# Patient Record
Sex: FEMALE | Race: BLACK OR AFRICAN AMERICAN | Employment: UNEMPLOYED | URBAN - METROPOLITAN AREA
[De-identification: names, ages, dates, MRNs, and addresses within clinical notes are randomized per-mention and may not be internally consistent; named-entity substitution may affect disease eponyms.]

---

## 2020-06-26 ENCOUNTER — LAB ENCOUNTER (OUTPATIENT)
Dept: LAB | Age: 60
End: 2020-06-26
Attending: FAMILY MEDICINE
Payer: COMMERCIAL

## 2020-06-26 ENCOUNTER — TELEPHONE (OUTPATIENT)
Dept: INTERNAL MEDICINE CLINIC | Facility: CLINIC | Age: 60
End: 2020-06-26

## 2020-06-26 ENCOUNTER — OFFICE VISIT (OUTPATIENT)
Dept: FAMILY MEDICINE CLINIC | Facility: CLINIC | Age: 60
End: 2020-06-26
Payer: COMMERCIAL

## 2020-06-26 VITALS
BODY MASS INDEX: 37.68 KG/M2 | DIASTOLIC BLOOD PRESSURE: 83 MMHG | WEIGHT: 210 LBS | HEIGHT: 62.6 IN | TEMPERATURE: 99 F | SYSTOLIC BLOOD PRESSURE: 125 MMHG | HEART RATE: 74 BPM

## 2020-06-26 DIAGNOSIS — Z00.00 ANNUAL PHYSICAL EXAM: ICD-10-CM

## 2020-06-26 DIAGNOSIS — D57.00 HB-SS DISEASE WITH CRISIS (HCC): Primary | ICD-10-CM

## 2020-06-26 PROCEDURE — 85025 COMPLETE CBC W/AUTO DIFF WBC: CPT

## 2020-06-26 PROCEDURE — 84443 ASSAY THYROID STIM HORMONE: CPT

## 2020-06-26 PROCEDURE — 85007 BL SMEAR W/DIFF WBC COUNT: CPT

## 2020-06-26 PROCEDURE — 80061 LIPID PANEL: CPT

## 2020-06-26 PROCEDURE — 80053 COMPREHEN METABOLIC PANEL: CPT

## 2020-06-26 PROCEDURE — 85027 COMPLETE CBC AUTOMATED: CPT

## 2020-06-26 PROCEDURE — 36415 COLL VENOUS BLD VENIPUNCTURE: CPT

## 2020-06-26 PROCEDURE — 99203 OFFICE O/P NEW LOW 30 MIN: CPT | Performed by: FAMILY MEDICINE

## 2020-06-26 PROCEDURE — 83036 HEMOGLOBIN GLYCOSYLATED A1C: CPT

## 2020-06-26 RX ORDER — FOLIC ACID 1 MG/1
1 TABLET ORAL DAILY
COMMUNITY
Start: 2020-06-05 | End: 2021-06-24

## 2020-06-26 RX ORDER — ALBUTEROL SULFATE 90 UG/1
2 AEROSOL, METERED RESPIRATORY (INHALATION) EVERY 4 HOURS PRN
COMMUNITY
Start: 2020-05-11 | End: 2021-06-24

## 2020-06-26 RX ORDER — LIDOCAINE 50 MG/G
PATCH TOPICAL
COMMUNITY
Start: 2020-04-28 | End: 2020-07-22

## 2020-06-26 RX ORDER — OXYBUTYNIN CHLORIDE 15 MG/1
15 TABLET, EXTENDED RELEASE ORAL DAILY
COMMUNITY
Start: 2020-06-05 | End: 2020-12-10

## 2020-06-26 RX ORDER — WARFARIN SODIUM 5 MG/1
TABLET ORAL
Qty: 90 TABLET | Refills: 0 | Status: SHIPPED | OUTPATIENT
Start: 2020-06-26 | End: 2020-07-21

## 2020-06-26 RX ORDER — WARFARIN SODIUM 5 MG/1
TABLET ORAL
COMMUNITY
Start: 2018-09-19 | End: 2020-06-26

## 2020-06-26 RX ORDER — HYDROCODONE BITARTRATE AND ACETAMINOPHEN 10; 325 MG/1; MG/1
TABLET ORAL
COMMUNITY
Start: 2019-05-16 | End: 2020-06-26

## 2020-06-26 RX ORDER — CYANOCOBALAMIN (VITAMIN B-12) 1000 MCG
1 TABLET, EXTENDED RELEASE ORAL DAILY
COMMUNITY

## 2020-06-26 RX ORDER — FLUTICASONE FUROATE 100 UG/1
1 POWDER RESPIRATORY (INHALATION) DAILY
COMMUNITY
Start: 2020-06-17 | End: 2021-04-06

## 2020-06-26 RX ORDER — FLUTICASONE PROPIONATE 50 MCG
2 SPRAY, SUSPENSION (ML) NASAL DAILY
COMMUNITY
Start: 2020-06-08 | End: 2020-07-07

## 2020-06-26 RX ORDER — AMLODIPINE BESYLATE AND BENAZEPRIL HYDROCHLORIDE 10; 20 MG/1; MG/1
1 CAPSULE ORAL DAILY
COMMUNITY
Start: 2020-05-09 | End: 2021-07-29

## 2020-06-26 RX ORDER — SENNOSIDES 8.6 MG
TABLET ORAL
COMMUNITY
Start: 2017-11-09 | End: 2021-06-24

## 2020-06-26 RX ORDER — ERGOCALCIFEROL (VITAMIN D2) 10 MCG
1000 TABLET ORAL DAILY
COMMUNITY

## 2020-06-26 RX ORDER — HYDROCODONE BITARTRATE AND ACETAMINOPHEN 10; 325 MG/1; MG/1
TABLET ORAL
Qty: 60 TABLET | Refills: 0 | Status: SHIPPED | OUTPATIENT
Start: 2020-06-26 | End: 2020-07-22

## 2020-06-26 RX ORDER — FLUTICASONE PROPIONATE 50 MCG
SPRAY, SUSPENSION (ML) NASAL
COMMUNITY
Start: 2019-04-19 | End: 2021-06-24

## 2020-06-26 NOTE — TELEPHONE ENCOUNTER
Called and spoke with Daphne. She has been on coumadin many years, \"since I was younger, I don't remember the exact year\". Previously managed by [facility] coumadin clinic- recently has switched to [facility], [facility] physicians.   Reports last INR was in M

## 2020-06-26 NOTE — PROGRESS NOTES
HPI:    Patient ID: Lavern Caban is a 62year old female. Patient with history of sickle cell disease,   Asthma, wants to transfer care to us. Has apt with dr Alexandro Velazco soon. Asthma has been controlled. Wants refill on her medications.        Review of TO 1 1/2 TABS DAILY OR AS DIRECTED BY [facility]TICO[facility]ULATION [facility] 90 tablet 0     Allergies:  Opioid Analgesics       SHORTNESS OF BREATH    Comment:heart races with the use of codeine             Itching and tachycardia  Sulfa Antibiotics       SHORTNESS

## 2020-06-29 ENCOUNTER — ANTI-COAG VISIT (OUTPATIENT)
Dept: INTERNAL MEDICINE CLINIC | Facility: CLINIC | Age: 60
End: 2020-06-29
Payer: COMMERCIAL

## 2020-06-29 DIAGNOSIS — Z51.81 ENCOUNTER FOR THERAPEUTIC DRUG MONITORING: ICD-10-CM

## 2020-06-29 DIAGNOSIS — D57.00 HB-SS DISEASE WITH CRISIS (HCC): ICD-10-CM

## 2020-06-29 DIAGNOSIS — Z79.01 LONG TERM (CURRENT) USE OF ANTICOAGULANTS: Primary | ICD-10-CM

## 2020-06-29 PROCEDURE — 85610 PROTHROMBIN TIME: CPT

## 2020-06-29 PROCEDURE — 93793 ANTICOAG MGMT PT WARFARIN: CPT

## 2020-06-29 PROCEDURE — 36416 COLLJ CAPILLARY BLOOD SPEC: CPT

## 2020-07-03 ENCOUNTER — ANTI-COAG VISIT (OUTPATIENT)
Dept: INTERNAL MEDICINE CLINIC | Facility: CLINIC | Age: 60
End: 2020-07-03
Payer: COMMERCIAL

## 2020-07-03 DIAGNOSIS — D57.00 HB-SS DISEASE WITH CRISIS (HCC): ICD-10-CM

## 2020-07-03 DIAGNOSIS — Z51.81 ENCOUNTER FOR THERAPEUTIC DRUG MONITORING: ICD-10-CM

## 2020-07-03 DIAGNOSIS — Z79.01 LONG TERM (CURRENT) USE OF ANTICOAGULANTS: ICD-10-CM

## 2020-07-03 LAB
INR: 3.6 (ref 0.8–1.2)
TEST STRIP EXPIRATION DATE: ABNORMAL DATE

## 2020-07-03 PROCEDURE — 36416 COLLJ CAPILLARY BLOOD SPEC: CPT

## 2020-07-03 PROCEDURE — 93793 ANTICOAG MGMT PT WARFARIN: CPT

## 2020-07-03 PROCEDURE — 85610 PROTHROMBIN TIME: CPT

## 2020-07-07 ENCOUNTER — OFFICE VISIT (OUTPATIENT)
Dept: HEMATOLOGY/ONCOLOGY | Facility: HOSPITAL | Age: 60
End: 2020-07-07
Attending: INTERNAL MEDICINE
Payer: COMMERCIAL

## 2020-07-07 VITALS
DIASTOLIC BLOOD PRESSURE: 64 MMHG | HEART RATE: 72 BPM | SYSTOLIC BLOOD PRESSURE: 126 MMHG | RESPIRATION RATE: 18 BRPM | BODY MASS INDEX: 37.71 KG/M2 | WEIGHT: 210.19 LBS | OXYGEN SATURATION: 94 % | HEIGHT: 62.5 IN | TEMPERATURE: 99 F

## 2020-07-07 DIAGNOSIS — Z80.3 FAMILY HISTORY OF BREAST CANCER IN FIRST DEGREE RELATIVE: ICD-10-CM

## 2020-07-07 DIAGNOSIS — Z80.41 FAMILY HISTORY OF OVARIAN CANCER: ICD-10-CM

## 2020-07-07 DIAGNOSIS — D57.20 SICKLE CELL DISEASE, TYPE SC, WITHOUT CRISIS (HCC): Primary | ICD-10-CM

## 2020-07-07 PROBLEM — D57.00 SICKLE CELL ANEMIA WITH CRISIS (HCC): Status: ACTIVE | Noted: 2020-07-07

## 2020-07-07 PROCEDURE — 99244 OFF/OP CNSLTJ NEW/EST MOD 40: CPT | Performed by: INTERNAL MEDICINE

## 2020-07-07 NOTE — PROGRESS NOTES
FERNANDO Madera is a 62year old female with dx of Sickle cell disease, type sc, without crisis (hcc) here to establish care. Followed with Dr. Daniel Fitzgerald at [facility], then with Dr. Timmy Perales at [facility]. She usually follows every 3 months. She is on p.o. Cardiovascular: Negative for chest pain and leg swelling. Gastrointestinal: Negative for abdominal pain (not curently, only if upset. ), constipation and diarrhea. Genitourinary: Negative for dysuria and frequency.          No changes to urine color   M Allergies:     Opioid Analgesics       SHORTNESS OF BREATH    Comment:heart races with the use of codeine             Itching and tachycardia  Sulfa Antibiotics       SHORTNESS OF BREATH    Comment:Rash and tongue swells, itching  Dust Mite Extract       O Lymphatics: There is no palpable lymphadenopathy throughout in the cervical, supraclavicular, axillary, or inguinal regions.   Psych/Depression: nl        ASSESSMENT/PLAN:   Sickle cell disease, type sc, without crisis (hcc)  (primary encounter diagnosis) No orders of the defined types were placed in this encounter.

## 2020-07-17 ENCOUNTER — ANTI-COAG VISIT (OUTPATIENT)
Dept: INTERNAL MEDICINE CLINIC | Facility: CLINIC | Age: 60
End: 2020-07-17
Payer: COMMERCIAL

## 2020-07-17 DIAGNOSIS — D57.20 SICKLE CELL DISEASE, TYPE SC, WITHOUT CRISIS (HCC): ICD-10-CM

## 2020-07-17 DIAGNOSIS — Z79.01 LONG TERM (CURRENT) USE OF ANTICOAGULANTS: ICD-10-CM

## 2020-07-17 DIAGNOSIS — D57.00 HB-SS DISEASE WITH CRISIS (HCC): ICD-10-CM

## 2020-07-17 DIAGNOSIS — Z51.81 ENCOUNTER FOR THERAPEUTIC DRUG MONITORING: ICD-10-CM

## 2020-07-17 LAB
INR: 3 (ref 0.8–1.2)
TEST STRIP EXPIRATION DATE: ABNORMAL DATE

## 2020-07-17 PROCEDURE — 85610 PROTHROMBIN TIME: CPT

## 2020-07-17 PROCEDURE — 36416 COLLJ CAPILLARY BLOOD SPEC: CPT

## 2020-07-17 PROCEDURE — 93793 ANTICOAG MGMT PT WARFARIN: CPT

## 2020-07-21 RX ORDER — WARFARIN SODIUM 5 MG/1
TABLET ORAL
Qty: 45 TABLET | Refills: 1 | Status: SHIPPED | OUTPATIENT
Start: 2020-07-21 | End: 2020-08-18

## 2020-07-22 RX ORDER — HYDROCODONE BITARTRATE AND ACETAMINOPHEN 10; 325 MG/1; MG/1
TABLET ORAL
Qty: 60 TABLET | Refills: 0 | Status: SHIPPED | OUTPATIENT
Start: 2020-07-22 | End: 2020-08-19

## 2020-07-22 RX ORDER — LIDOCAINE 50 MG/G
PATCH TOPICAL
Qty: 60 PATCH | Refills: 2 | Status: SHIPPED | OUTPATIENT
Start: 2020-07-22 | End: 2021-03-17

## 2020-08-14 ENCOUNTER — ANTI-COAG VISIT (OUTPATIENT)
Dept: INTERNAL MEDICINE CLINIC | Facility: CLINIC | Age: 60
End: 2020-08-14
Payer: COMMERCIAL

## 2020-08-14 DIAGNOSIS — Z51.81 ENCOUNTER FOR THERAPEUTIC DRUG MONITORING: ICD-10-CM

## 2020-08-14 DIAGNOSIS — D57.20 SICKLE CELL DISEASE, TYPE SC, WITHOUT CRISIS (HCC): ICD-10-CM

## 2020-08-14 DIAGNOSIS — Z79.01 LONG TERM (CURRENT) USE OF ANTICOAGULANTS: ICD-10-CM

## 2020-08-14 DIAGNOSIS — D57.00 HB-SS DISEASE WITH CRISIS (HCC): ICD-10-CM

## 2020-08-14 LAB
INR: 2.6 (ref 0.8–1.2)
TEST STRIP EXPIRATION DATE: ABNORMAL DATE

## 2020-08-14 PROCEDURE — 85610 PROTHROMBIN TIME: CPT

## 2020-08-14 PROCEDURE — 36416 COLLJ CAPILLARY BLOOD SPEC: CPT

## 2020-08-14 PROCEDURE — 93793 ANTICOAG MGMT PT WARFARIN: CPT

## 2020-08-18 RX ORDER — WARFARIN SODIUM 5 MG/1
TABLET ORAL
Qty: 45 TABLET | Refills: 1 | Status: SHIPPED | OUTPATIENT
Start: 2020-08-18 | End: 2020-09-09

## 2020-08-19 RX ORDER — HYDROCODONE BITARTRATE AND ACETAMINOPHEN 10; 325 MG/1; MG/1
TABLET ORAL
Qty: 60 TABLET | Refills: 0 | Status: SHIPPED | OUTPATIENT
Start: 2020-08-19 | End: 2020-09-17

## 2020-08-31 ENCOUNTER — OFFICE VISIT (OUTPATIENT)
Dept: HEMATOLOGY/ONCOLOGY | Facility: HOSPITAL | Age: 60
End: 2020-08-31
Attending: INTERNAL MEDICINE
Payer: COMMERCIAL

## 2020-08-31 VITALS
RESPIRATION RATE: 18 BRPM | SYSTOLIC BLOOD PRESSURE: 137 MMHG | OXYGEN SATURATION: 94 % | HEART RATE: 60 BPM | TEMPERATURE: 98 F | DIASTOLIC BLOOD PRESSURE: 66 MMHG

## 2020-08-31 DIAGNOSIS — D57.00 SICKLE CELL ANEMIA WITH CRISIS (HCC): Primary | ICD-10-CM

## 2020-08-31 PROCEDURE — 96360 HYDRATION IV INFUSION INIT: CPT

## 2020-08-31 PROCEDURE — 96361 HYDRATE IV INFUSION ADD-ON: CPT

## 2020-08-31 NOTE — PROGRESS NOTES
Patient arrives for hydration for sickle cell crisis. Patient reports that for the last few days she has noticed an increase her normal sickle cell pain. States she woke up and her left hand was in a locked position and she knew it was time for hydration.

## 2020-09-09 RX ORDER — WARFARIN SODIUM 5 MG/1
TABLET ORAL
Qty: 45 TABLET | Refills: 1 | Status: SHIPPED | OUTPATIENT
Start: 2020-09-09 | End: 2020-10-04

## 2020-09-11 ENCOUNTER — ANTI-COAG VISIT (OUTPATIENT)
Dept: INTERNAL MEDICINE CLINIC | Facility: CLINIC | Age: 60
End: 2020-09-11
Payer: COMMERCIAL

## 2020-09-11 DIAGNOSIS — Z51.81 ENCOUNTER FOR THERAPEUTIC DRUG MONITORING: ICD-10-CM

## 2020-09-11 DIAGNOSIS — D57.00 HB-SS DISEASE WITH CRISIS (HCC): ICD-10-CM

## 2020-09-11 DIAGNOSIS — Z79.01 LONG TERM (CURRENT) USE OF ANTICOAGULANTS: ICD-10-CM

## 2020-09-11 DIAGNOSIS — D57.20 SICKLE CELL DISEASE, TYPE SC, WITHOUT CRISIS (HCC): ICD-10-CM

## 2020-09-11 LAB
INR: 3.2 (ref 0.8–1.2)
TEST STRIP EXPIRATION DATE: ABNORMAL DATE

## 2020-09-11 PROCEDURE — 36416 COLLJ CAPILLARY BLOOD SPEC: CPT

## 2020-09-11 PROCEDURE — 85610 PROTHROMBIN TIME: CPT

## 2020-09-11 PROCEDURE — 93793 ANTICOAG MGMT PT WARFARIN: CPT

## 2020-09-17 RX ORDER — HYDROCODONE BITARTRATE AND ACETAMINOPHEN 10; 325 MG/1; MG/1
TABLET ORAL
Qty: 60 TABLET | Refills: 0 | Status: SHIPPED | OUTPATIENT
Start: 2020-09-17 | End: 2020-10-19

## 2020-09-24 ENCOUNTER — TELEPHONE (OUTPATIENT)
Dept: HEMATOLOGY/ONCOLOGY | Facility: HOSPITAL | Age: 60
End: 2020-09-24

## 2020-09-24 ENCOUNTER — OFFICE VISIT (OUTPATIENT)
Dept: HEMATOLOGY/ONCOLOGY | Facility: HOSPITAL | Age: 60
End: 2020-09-24
Attending: INTERNAL MEDICINE
Payer: COMMERCIAL

## 2020-09-24 VITALS
RESPIRATION RATE: 18 BRPM | SYSTOLIC BLOOD PRESSURE: 117 MMHG | TEMPERATURE: 98 F | OXYGEN SATURATION: 96 % | DIASTOLIC BLOOD PRESSURE: 72 MMHG | HEART RATE: 90 BPM

## 2020-09-24 DIAGNOSIS — D57.00 SICKLE CELL ANEMIA WITH CRISIS (HCC): Primary | ICD-10-CM

## 2020-09-24 PROCEDURE — 96360 HYDRATION IV INFUSION INIT: CPT

## 2020-09-24 PROCEDURE — 96361 HYDRATE IV INFUSION ADD-ON: CPT

## 2020-09-24 NOTE — PROGRESS NOTES
Patient arrives for hydration for sickle cell crisis. Reports increased amount of pain and knew she needed hydration. Reports she thinks its because of the change of weather, states last week was cold and now this week its hot.   States if she gets hydratio

## 2020-09-24 NOTE — TELEPHONE ENCOUNTER
Patient of Dr Luciano Chaudhari - Sickle Cell Disease/Crisis    Patient is asking if she can come in for hydration today. I asked her to please hold. LEVI Ahmadi said yes and will enter the orders. I called Jose Puga and updated her. She said she can come now.  I upda

## 2020-10-04 RX ORDER — WARFARIN SODIUM 5 MG/1
TABLET ORAL
Qty: 45 TABLET | Refills: 1 | Status: SHIPPED | OUTPATIENT
Start: 2020-10-04 | End: 2020-10-30

## 2020-10-09 ENCOUNTER — ANTI-COAG VISIT (OUTPATIENT)
Dept: INTERNAL MEDICINE CLINIC | Facility: CLINIC | Age: 60
End: 2020-10-09
Payer: COMMERCIAL

## 2020-10-09 DIAGNOSIS — D57.20 SICKLE CELL DISEASE, TYPE SC, WITHOUT CRISIS (HCC): ICD-10-CM

## 2020-10-09 DIAGNOSIS — Z51.81 ENCOUNTER FOR THERAPEUTIC DRUG MONITORING: ICD-10-CM

## 2020-10-09 DIAGNOSIS — Z79.01 LONG TERM (CURRENT) USE OF ANTICOAGULANTS: ICD-10-CM

## 2020-10-09 DIAGNOSIS — D57.00 HB-SS DISEASE WITH CRISIS (HCC): ICD-10-CM

## 2020-10-09 PROCEDURE — 85610 PROTHROMBIN TIME: CPT

## 2020-10-09 PROCEDURE — 36416 COLLJ CAPILLARY BLOOD SPEC: CPT

## 2020-10-09 PROCEDURE — 93793 ANTICOAG MGMT PT WARFARIN: CPT

## 2020-10-19 ENCOUNTER — OFFICE VISIT (OUTPATIENT)
Dept: HEMATOLOGY/ONCOLOGY | Facility: HOSPITAL | Age: 60
End: 2020-10-19
Attending: INTERNAL MEDICINE
Payer: COMMERCIAL

## 2020-10-19 ENCOUNTER — TELEPHONE (OUTPATIENT)
Dept: HEMATOLOGY/ONCOLOGY | Facility: HOSPITAL | Age: 60
End: 2020-10-19

## 2020-10-19 VITALS
DIASTOLIC BLOOD PRESSURE: 67 MMHG | OXYGEN SATURATION: 95 % | TEMPERATURE: 98 F | RESPIRATION RATE: 18 BRPM | HEART RATE: 65 BPM | WEIGHT: 216 LBS | BODY MASS INDEX: 39 KG/M2 | SYSTOLIC BLOOD PRESSURE: 124 MMHG

## 2020-10-19 VITALS
RESPIRATION RATE: 18 BRPM | DIASTOLIC BLOOD PRESSURE: 67 MMHG | TEMPERATURE: 98 F | HEIGHT: 62.5 IN | OXYGEN SATURATION: 95 % | WEIGHT: 216 LBS | SYSTOLIC BLOOD PRESSURE: 124 MMHG | BODY MASS INDEX: 38.76 KG/M2 | HEART RATE: 65 BPM

## 2020-10-19 DIAGNOSIS — D57.20 SICKLE CELL DISEASE, TYPE SC, WITHOUT CRISIS (HCC): Primary | ICD-10-CM

## 2020-10-19 PROCEDURE — 99214 OFFICE O/P EST MOD 30 MIN: CPT | Performed by: NURSE PRACTITIONER

## 2020-10-19 RX ORDER — HYDROCODONE BITARTRATE AND ACETAMINOPHEN 10; 325 MG/1; MG/1
TABLET ORAL
Qty: 60 TABLET | Refills: 0 | Status: SHIPPED | OUTPATIENT
Start: 2020-10-19 | End: 2020-11-19

## 2020-10-19 NOTE — TELEPHONE ENCOUNTER
Dr David Eye - Sickle Cell Disease/Crisis    Pain    Pain: (Patient his having pain from the right side of her umbilicus that radiates around to her right lower back. Pain has been occurring for the last few weeks, but has gotten worse.  She is taking Norco 10-32

## 2020-10-19 NOTE — TELEPHONE ENCOUNTER
Daphne called, looking to make an appointment with Dr. Markie Bal this week because she is experiencing the following symptom:    Right side, front and back pain. She states it is a pain that wraps around her waist and is getting progressively worse.     She would

## 2020-10-19 NOTE — PROGRESS NOTES
HPI   Woody Flood is a 62year old female with dx of Sickle cell disease, type sc, without crisis (hcc)  (primary encounter diagnosis) here for follow up. Followed with Dr. Victor M Almendarez at [facility], then with [place]. Sravani [place].   She usually follows every CT abd in May with contrast in June - complex cyst on R. No other findings of significance for abd pain. She has not been on hydroxyurea due to no frequent crisis. Her triggers are weather and states also if she gets upset.      She was following at [facility] • amLODIPine Besy-Benazepril HCl 10-20 MG Oral Cap Take 1 capsule by mouth daily. • Calcium Citrate-Vitamin D 315-250 MG-UNIT Oral Tab Take 1 tablet by mouth daily.      • Vitamin D, Cholecalciferol, (VITAMIN D3) 10 MCG (400 UNIT) Oral Tab Take 1,000 Un • Breast Cancer Maternal Aunt 68   • Other (breast lump removed) Maternal Aunt          PHYSICAL EXAM:    /67 (BP Location: Left arm, Patient Position: Sitting, Cuff Size: large)   Pulse 65   Temp 97.6 °F (36.4 °C) (Temporal)   Resp 18   Ht 1.588 m ( --Encouraged to have mammography yearly  --Recommend that she d/w sister about genetic testing. If her sister has been tested and she has a mutation, advised patient to consider genetic counseling and testing.   This will give her the option of closer surv RDW 16.3 (H) 11.0 - 15.0 %    .0 150.0 - 450.0 10(3)uL    Neutrophil Absolute Prelim 3.81 1.50 - 7.70 x10 (3) uL    Neutrophil Absolute 3.81 1.50 - 7.70 x10(3) uL    Lymphocyte Absolute 2.27 1.00 - 4.00 x10(3) uL    Monocyte Absolute 0.85 0.10 - 1.

## 2020-10-23 ENCOUNTER — TELEPHONE (OUTPATIENT)
Dept: HEMATOLOGY/ONCOLOGY | Facility: HOSPITAL | Age: 60
End: 2020-10-23

## 2020-10-23 ENCOUNTER — HOSPITAL ENCOUNTER (OUTPATIENT)
Dept: CT IMAGING | Facility: HOSPITAL | Age: 60
Discharge: HOME | End: 2020-10-23
Attending: NURSE PRACTITIONER
Payer: COMMERCIAL

## 2020-10-23 DIAGNOSIS — D57.20 SICKLE CELL DISEASE, TYPE SC, WITHOUT CRISIS (HCC): ICD-10-CM

## 2020-10-23 PROCEDURE — 74176 CT ABD & PELVIS W/O CONTRAST: CPT | Performed by: NURSE PRACTITIONER

## 2020-10-23 PROCEDURE — 71260 CT THORAX DX C+: CPT | Performed by: NURSE PRACTITIONER

## 2020-10-23 NOTE — TELEPHONE ENCOUNTER
Jeevan from [address] [place] [address] called looking for clarification on the [atient's CT order for chest w/ contrast and abdomen and pelvis w/o contrast. Please call

## 2020-10-30 RX ORDER — WARFARIN SODIUM 5 MG/1
TABLET ORAL
Qty: 45 TABLET | Refills: 1 | Status: SHIPPED | OUTPATIENT
Start: 2020-10-30 | End: 2020-11-26

## 2020-11-04 ENCOUNTER — TELEPHONE (OUTPATIENT)
Dept: HEMATOLOGY/ONCOLOGY | Facility: HOSPITAL | Age: 60
End: 2020-11-04

## 2020-11-04 NOTE — TELEPHONE ENCOUNTER
Daphne called saying she can't get into her mychart to view her test results, and is asking for a call with her results. I also sent Avalon to the [employer] help line for assistance with her account.

## 2020-11-04 NOTE — TELEPHONE ENCOUNTER
Pt called per Dr. Desire John requestt and notified that CT scan showed no blood clot or pneumonia. Pt verbalizes understanding.

## 2020-11-06 ENCOUNTER — IMMUNIZATION (OUTPATIENT)
Dept: FAMILY MEDICINE CLINIC | Facility: CLINIC | Age: 60
End: 2020-11-06
Payer: COMMERCIAL

## 2020-11-06 ENCOUNTER — ANTI-COAG VISIT (OUTPATIENT)
Dept: INTERNAL MEDICINE CLINIC | Facility: CLINIC | Age: 60
End: 2020-11-06
Payer: COMMERCIAL

## 2020-11-06 DIAGNOSIS — D57.00 HB-SS DISEASE WITH CRISIS (HCC): ICD-10-CM

## 2020-11-06 DIAGNOSIS — D57.20 SICKLE CELL DISEASE, TYPE SC, WITHOUT CRISIS (HCC): ICD-10-CM

## 2020-11-06 DIAGNOSIS — Z23 NEED FOR VACCINATION: ICD-10-CM

## 2020-11-06 DIAGNOSIS — Z51.81 ENCOUNTER FOR THERAPEUTIC DRUG MONITORING: ICD-10-CM

## 2020-11-06 DIAGNOSIS — Z79.01 LONG TERM (CURRENT) USE OF ANTICOAGULANTS: ICD-10-CM

## 2020-11-06 PROCEDURE — 90686 IIV4 VACC NO PRSV 0.5 ML IM: CPT | Performed by: STUDENT IN AN ORGANIZED HEALTH CARE EDUCATION/TRAINING PROGRAM

## 2020-11-06 PROCEDURE — 90471 IMMUNIZATION ADMIN: CPT | Performed by: STUDENT IN AN ORGANIZED HEALTH CARE EDUCATION/TRAINING PROGRAM

## 2020-11-06 PROCEDURE — 85610 PROTHROMBIN TIME: CPT

## 2020-11-06 PROCEDURE — 36416 COLLJ CAPILLARY BLOOD SPEC: CPT

## 2020-11-06 PROCEDURE — 93793 ANTICOAG MGMT PT WARFARIN: CPT

## 2020-11-19 RX ORDER — HYDROCODONE BITARTRATE AND ACETAMINOPHEN 10; 325 MG/1; MG/1
TABLET ORAL
Qty: 60 TABLET | Refills: 0 | Status: SHIPPED | OUTPATIENT
Start: 2020-11-19 | End: 2020-12-22

## 2020-11-26 RX ORDER — WARFARIN SODIUM 5 MG/1
TABLET ORAL
Qty: 45 TABLET | Refills: 1 | Status: SHIPPED | OUTPATIENT
Start: 2020-11-26 | End: 2020-12-22

## 2020-12-04 ENCOUNTER — ANTI-COAG VISIT (OUTPATIENT)
Dept: INTERNAL MEDICINE CLINIC | Facility: CLINIC | Age: 60
End: 2020-12-04
Payer: COMMERCIAL

## 2020-12-04 DIAGNOSIS — D57.00 HB-SS DISEASE WITH CRISIS (HCC): ICD-10-CM

## 2020-12-04 DIAGNOSIS — Z79.01 LONG TERM (CURRENT) USE OF ANTICOAGULANTS: ICD-10-CM

## 2020-12-04 DIAGNOSIS — Z51.81 ENCOUNTER FOR THERAPEUTIC DRUG MONITORING: ICD-10-CM

## 2020-12-04 DIAGNOSIS — D57.20 SICKLE CELL DISEASE, TYPE SC, WITHOUT CRISIS (HCC): ICD-10-CM

## 2020-12-04 PROCEDURE — 36416 COLLJ CAPILLARY BLOOD SPEC: CPT

## 2020-12-04 PROCEDURE — 85610 PROTHROMBIN TIME: CPT

## 2020-12-04 PROCEDURE — 93793 ANTICOAG MGMT PT WARFARIN: CPT

## 2020-12-10 RX ORDER — OXYBUTYNIN CHLORIDE 15 MG/1
TABLET, EXTENDED RELEASE ORAL
Qty: 90 TABLET | Refills: 2 | Status: SHIPPED | OUTPATIENT
Start: 2020-12-10 | End: 2021-06-21

## 2020-12-17 ENCOUNTER — TELEPHONE (OUTPATIENT)
Dept: HEMATOLOGY/ONCOLOGY | Facility: HOSPITAL | Age: 60
End: 2020-12-17

## 2020-12-17 ENCOUNTER — OFFICE VISIT (OUTPATIENT)
Dept: HEMATOLOGY/ONCOLOGY | Facility: HOSPITAL | Age: 60
End: 2020-12-17
Attending: INTERNAL MEDICINE
Payer: COMMERCIAL

## 2020-12-17 VITALS
HEART RATE: 82 BPM | RESPIRATION RATE: 16 BRPM | DIASTOLIC BLOOD PRESSURE: 70 MMHG | SYSTOLIC BLOOD PRESSURE: 118 MMHG | TEMPERATURE: 99 F

## 2020-12-17 DIAGNOSIS — D57.00 SICKLE CELL ANEMIA WITH CRISIS (HCC): Primary | ICD-10-CM

## 2020-12-17 PROCEDURE — 96360 HYDRATION IV INFUSION INIT: CPT

## 2020-12-17 NOTE — PROGRESS NOTES
Patient is here for hydration, has history of Sickle cell Anemia. States this morning her Right shoulder and Right hip was popping, when she has those symptoms she needs hydration. IV placed in Right ac/  1L 0.9NS started to infusion over 2 hours.   Aaron Enciso

## 2020-12-17 NOTE — TELEPHONE ENCOUNTER
Dr Bharat Duran patient - Sickle Cell patient called asking for hydration. I spoke with Kecia Gonzalez and she said Daphne can come at 3:15 pm today. Daphne accepted the appointment.

## 2020-12-22 RX ORDER — HYDROCODONE BITARTRATE AND ACETAMINOPHEN 10; 325 MG/1; MG/1
TABLET ORAL
Qty: 60 TABLET | Refills: 0 | Status: SHIPPED | OUTPATIENT
Start: 2020-12-22 | End: 2021-01-07

## 2020-12-22 RX ORDER — WARFARIN SODIUM 5 MG/1
TABLET ORAL
Qty: 45 TABLET | Refills: 1 | Status: SHIPPED | OUTPATIENT
Start: 2020-12-22 | End: 2021-01-18

## 2020-12-30 ENCOUNTER — ANTI-COAG VISIT (OUTPATIENT)
Dept: INTERNAL MEDICINE CLINIC | Facility: CLINIC | Age: 60
End: 2020-12-30
Payer: COMMERCIAL

## 2020-12-30 DIAGNOSIS — Z51.81 ENCOUNTER FOR THERAPEUTIC DRUG MONITORING: ICD-10-CM

## 2020-12-30 DIAGNOSIS — Z79.01 LONG TERM (CURRENT) USE OF ANTICOAGULANTS: ICD-10-CM

## 2020-12-30 DIAGNOSIS — D57.00 HB-SS DISEASE WITH CRISIS (HCC): ICD-10-CM

## 2020-12-30 DIAGNOSIS — D57.20 SICKLE CELL DISEASE, TYPE SC, WITHOUT CRISIS (HCC): ICD-10-CM

## 2020-12-30 PROCEDURE — 85610 PROTHROMBIN TIME: CPT

## 2020-12-30 PROCEDURE — 36416 COLLJ CAPILLARY BLOOD SPEC: CPT

## 2020-12-30 PROCEDURE — 93793 ANTICOAG MGMT PT WARFARIN: CPT

## 2021-01-07 ENCOUNTER — NURSE ONLY (OUTPATIENT)
Dept: HEMATOLOGY/ONCOLOGY | Facility: HOSPITAL | Age: 61
End: 2021-01-07
Attending: INTERNAL MEDICINE
Payer: COMMERCIAL

## 2021-01-07 VITALS
DIASTOLIC BLOOD PRESSURE: 73 MMHG | RESPIRATION RATE: 16 BRPM | WEIGHT: 214 LBS | OXYGEN SATURATION: 95 % | HEART RATE: 75 BPM | SYSTOLIC BLOOD PRESSURE: 123 MMHG | TEMPERATURE: 98 F | BODY MASS INDEX: 38.4 KG/M2 | HEIGHT: 62.5 IN

## 2021-01-07 DIAGNOSIS — D57.20 SICKLE CELL DISEASE, TYPE SC, WITHOUT CRISIS (HCC): ICD-10-CM

## 2021-01-07 DIAGNOSIS — D57.20 SICKLE CELL DISEASE, TYPE SC, WITHOUT CRISIS (HCC): Primary | ICD-10-CM

## 2021-01-07 PROBLEM — Z51.81 ENCOUNTER FOR MEDICATION MONITORING: Status: ACTIVE | Noted: 2021-01-07

## 2021-01-07 PROCEDURE — 99213 OFFICE O/P EST LOW 20 MIN: CPT | Performed by: INTERNAL MEDICINE

## 2021-01-07 PROCEDURE — 36415 COLL VENOUS BLD VENIPUNCTURE: CPT

## 2021-01-07 RX ORDER — HYDROCODONE BITARTRATE AND ACETAMINOPHEN 10; 325 MG/1; MG/1
TABLET ORAL
Qty: 60 TABLET | Refills: 0 | Status: SHIPPED | OUTPATIENT
Start: 2021-01-07 | End: 2021-02-18

## 2021-01-07 NOTE — PROGRESS NOTES
FERNANDO Amador is a 62year old female with dx of Sickle cell disease, type sc, without crisis (hcc)  (primary encounter diagnosis) here to establish care. Followed with Dr. Governor May at [facility], then with Dr. Kianna Saha at [facility].   She usually follows e HYDROcodone-acetaminophen  MG Oral Tab 1/2 to one tablet every four hours as needed for pain 60 tablet 0   • OXYBUTYNIN CHLORIDE ER 15 MG Oral Tablet 24 Hr TAKE 1 TABLET BY MOUTH EVERY DAY 90 tablet 2   • lidocaine 5 % External Patch PLACE 2 PATCHES use: Never      Family History   Problem Relation Age of Onset   • Breast Cancer Mother 64   • Heart Disease Father    • Dementia Father    • Sickle Cell Sister         SC   • Heart Attack Brother    • Dementia Maternal Grandmother    • Stroke Maternal Gra

## 2021-01-15 ENCOUNTER — HOSPITAL ENCOUNTER (OUTPATIENT)
Dept: MAMMOGRAPHY | Facility: HOSPITAL | Age: 61
Discharge: HOME | End: 2021-01-15
Attending: FAMILY MEDICINE
Payer: COMMERCIAL

## 2021-01-15 DIAGNOSIS — Z00.00 ANNUAL PHYSICAL EXAM: ICD-10-CM

## 2021-01-15 PROCEDURE — 77063 BREAST TOMOSYNTHESIS BI: CPT | Performed by: FAMILY MEDICINE

## 2021-01-15 PROCEDURE — 77067 SCR MAMMO BI INCL CAD: CPT | Performed by: FAMILY MEDICINE

## 2021-01-18 RX ORDER — WARFARIN SODIUM 5 MG/1
TABLET ORAL
Qty: 45 TABLET | Refills: 1 | Status: SHIPPED | OUTPATIENT
Start: 2021-01-18 | End: 2021-02-09

## 2021-01-27 ENCOUNTER — ANTI-COAG VISIT (OUTPATIENT)
Dept: INTERNAL MEDICINE CLINIC | Facility: CLINIC | Age: 61
End: 2021-01-27
Payer: COMMERCIAL

## 2021-01-27 DIAGNOSIS — Z51.81 ENCOUNTER FOR THERAPEUTIC DRUG MONITORING: ICD-10-CM

## 2021-01-27 DIAGNOSIS — D57.20 SICKLE CELL DISEASE, TYPE SC, WITHOUT CRISIS (HCC): ICD-10-CM

## 2021-01-27 DIAGNOSIS — Z79.01 LONG TERM (CURRENT) USE OF ANTICOAGULANTS: ICD-10-CM

## 2021-01-27 DIAGNOSIS — D57.00 HB-SS DISEASE WITH CRISIS (HCC): ICD-10-CM

## 2021-01-27 LAB
INR: 2.6 (ref 0.8–1.2)
TEST STRIP EXPIRATION DATE: ABNORMAL DATE

## 2021-01-27 PROCEDURE — 93793 ANTICOAG MGMT PT WARFARIN: CPT

## 2021-01-27 PROCEDURE — 36416 COLLJ CAPILLARY BLOOD SPEC: CPT

## 2021-01-27 PROCEDURE — 85610 PROTHROMBIN TIME: CPT

## 2021-02-02 ENCOUNTER — HOSPITAL ENCOUNTER (EMERGENCY)
Facility: HOSPITAL | Age: 61
Discharge: HOME | End: 2021-02-02
Attending: EMERGENCY MEDICINE
Payer: COMMERCIAL

## 2021-02-02 ENCOUNTER — NURSE TRIAGE (OUTPATIENT)
Dept: FAMILY MEDICINE CLINIC | Facility: CLINIC | Age: 61
End: 2021-02-02

## 2021-02-02 VITALS
TEMPERATURE: 98 F | DIASTOLIC BLOOD PRESSURE: 77 MMHG | OXYGEN SATURATION: 98 % | WEIGHT: 214 LBS | RESPIRATION RATE: 18 BRPM | SYSTOLIC BLOOD PRESSURE: 125 MMHG | BODY MASS INDEX: 39 KG/M2 | HEART RATE: 79 BPM

## 2021-02-02 DIAGNOSIS — M79.3 PANNICULITIS, NODULAR NONSUPPURATIVE: Primary | ICD-10-CM

## 2021-02-02 LAB
BILIRUB UR QL: NEGATIVE
CLARITY UR: CLEAR
COLOR UR: YELLOW
GLUCOSE UR-MCNC: NEGATIVE MG/DL
HGB UR QL STRIP.AUTO: NEGATIVE
KETONES UR-MCNC: NEGATIVE MG/DL
NITRITE UR QL STRIP.AUTO: NEGATIVE
PH UR: 7 [PH] (ref 5–8)
PROT UR-MCNC: NEGATIVE MG/DL
RBC #/AREA URNS AUTO: 1 /HPF
SP GR UR STRIP: 1.01 (ref 1–1.03)
UROBILINOGEN UR STRIP-ACNC: <2
WBC #/AREA URNS AUTO: 14 /HPF

## 2021-02-02 PROCEDURE — 81001 URINALYSIS AUTO W/SCOPE: CPT | Performed by: EMERGENCY MEDICINE

## 2021-02-02 PROCEDURE — 99283 EMERGENCY DEPT VISIT LOW MDM: CPT

## 2021-02-02 PROCEDURE — 87086 URINE CULTURE/COLONY COUNT: CPT | Performed by: EMERGENCY MEDICINE

## 2021-02-02 RX ORDER — KETOROLAC TROMETHAMINE 10 MG/1
10 TABLET, FILM COATED ORAL EVERY 8 HOURS PRN
Qty: 15 TABLET | Refills: 0 | Status: SHIPPED | OUTPATIENT
Start: 2021-02-02 | End: 2021-02-09

## 2021-02-02 NOTE — TELEPHONE ENCOUNTER
Spoke with patient,advised ED per Dr Aliya Monte recommendation and agrees, states will be going to ER [facility], instructed to wear mask. Appointment cancelled for today.

## 2021-02-02 NOTE — TELEPHONE ENCOUNTER
Please reply to pool: EM RN TRIAGE  Action Requested: Summary for Provider     []  Critical Lab, Recommendations Needed  [] Need Additional Advice  []   FYI    []   Need Orders  [] Need Medications Sent to Pharmacy  []  Other     SUMMARY: Wants to be s

## 2021-02-02 NOTE — ED PROVIDER NOTES
Patient Seen in: [facility] AND [facility] Emergency Department    History   Patient presents with:  Flank Pain    Stated Complaint: flank pain on both sides    HPI    Patient complains of pain in sides bilateral.  complains of small subcutaneous nodules in skin Oral Tab,  Take 1 mg by mouth daily. Ipratropium-Albuterol  MCG/ACT Inhalation Aero Soln,  Inhale 1 puff into the lungs.    Senna 8.6 MG Oral Tab,  One to two tablets at bedtime       Family History   Problem Relation Age of Onset   • Breast Cancer cooperative,    Differential includes:panniculitis nodular vs. Lipoma no evidence of acute infection    ED Course     Labs Reviewed   URINALYSIS WITH CULTURE REFLEX       MDM       Cardiac Monitor: Pulse Readings from Last 1 Encounters:  02/02/21 : 79  , ,

## 2021-02-03 NOTE — TELEPHONE ENCOUNTER
Per chart notes pt was treated/released from [address] [place] [address] yesterday.      Clinical Impression:  Panniculitis, nodular nonsuppurative  (primary encounter diagnosis)     Disposition:  Discharge  2/2/2021  3:27 pm     Follow-up:  MD Kayla Ricardo

## 2021-02-09 RX ORDER — WARFARIN SODIUM 5 MG/1
TABLET ORAL
Qty: 45 TABLET | Refills: 1 | Status: SHIPPED | OUTPATIENT
Start: 2021-02-09 | End: 2021-03-04

## 2021-02-18 RX ORDER — HYDROCODONE BITARTRATE AND ACETAMINOPHEN 10; 325 MG/1; MG/1
TABLET ORAL
Qty: 60 TABLET | Refills: 0 | Status: SHIPPED | OUTPATIENT
Start: 2021-02-18 | End: 2021-03-17

## 2021-02-25 ENCOUNTER — ANTI-COAG VISIT (OUTPATIENT)
Dept: INTERNAL MEDICINE CLINIC | Facility: CLINIC | Age: 61
End: 2021-02-25
Payer: COMMERCIAL

## 2021-02-25 DIAGNOSIS — Z51.81 ENCOUNTER FOR THERAPEUTIC DRUG MONITORING: ICD-10-CM

## 2021-02-25 DIAGNOSIS — D57.20 SICKLE CELL DISEASE, TYPE SC, WITHOUT CRISIS (HCC): ICD-10-CM

## 2021-02-25 DIAGNOSIS — D57.00 HB-SS DISEASE WITH CRISIS (HCC): ICD-10-CM

## 2021-02-25 DIAGNOSIS — Z79.01 LONG TERM (CURRENT) USE OF ANTICOAGULANTS: ICD-10-CM

## 2021-02-25 LAB — INR: 1.9 (ref 0.8–1.2)

## 2021-02-25 PROCEDURE — 85610 PROTHROMBIN TIME: CPT

## 2021-02-25 PROCEDURE — 93793 ANTICOAG MGMT PT WARFARIN: CPT

## 2021-02-25 PROCEDURE — 36416 COLLJ CAPILLARY BLOOD SPEC: CPT

## 2021-03-04 RX ORDER — WARFARIN SODIUM 5 MG/1
TABLET ORAL
Qty: 45 TABLET | Refills: 1 | Status: SHIPPED | OUTPATIENT
Start: 2021-03-04 | End: 2021-03-27

## 2021-03-11 ENCOUNTER — TELEPHONE (OUTPATIENT)
Dept: FAMILY MEDICINE CLINIC | Facility: CLINIC | Age: 61
End: 2021-03-11

## 2021-03-11 RX ORDER — OXYBUTYNIN CHLORIDE 15 MG/1
15 TABLET, EXTENDED RELEASE ORAL DAILY
Qty: 90 TABLET | Refills: 1 | Status: CANCELLED | OUTPATIENT
Start: 2021-03-11

## 2021-03-11 RX ORDER — FLUTICASONE FUROATE 100 UG/1
1 POWDER RESPIRATORY (INHALATION) DAILY
Qty: 3 EACH | Refills: 1 | Status: CANCELLED | OUTPATIENT
Start: 2021-03-11

## 2021-03-11 NOTE — TELEPHONE ENCOUNTER
[facility], [phone] would like a list of all medications approved by Dr. Taylor Sanford. Patient states that her old medications are still under her previous PCP, Dr. Valentine Sanchez, which she does not see any longer.

## 2021-03-11 NOTE — TELEPHONE ENCOUNTER
Spoke with Lucian Ledbetter from [facility],   verified  She stated pt requested a ref for Arnuity inhaler and oxybutynin. Pharm updated. pls advise, thanks in advance.          Requested Prescriptions     Pending Prescriptions Disp Refills   • Oxybutynin Chlo

## 2021-03-17 RX ORDER — HYDROCODONE BITARTRATE AND ACETAMINOPHEN 10; 325 MG/1; MG/1
TABLET ORAL
Qty: 60 TABLET | Refills: 0 | Status: SHIPPED | OUTPATIENT
Start: 2021-03-17 | End: 2021-04-16

## 2021-03-17 RX ORDER — LIDOCAINE 50 MG/G
PATCH TOPICAL
Qty: 60 PATCH | Refills: 2 | Status: SHIPPED | OUTPATIENT
Start: 2021-03-17 | End: 2021-09-10

## 2021-03-23 RX ORDER — OXYBUTYNIN CHLORIDE 15 MG/1
15 TABLET, EXTENDED RELEASE ORAL DAILY
Qty: 90 TABLET | Refills: 1 | Status: SHIPPED | OUTPATIENT
Start: 2021-03-23 | End: 2021-04-06

## 2021-03-23 RX ORDER — FLUTICASONE FUROATE 100 UG/1
1 POWDER RESPIRATORY (INHALATION) DAILY
Qty: 90 EACH | Refills: 0 | Status: SHIPPED | OUTPATIENT
Start: 2021-03-23 | End: 2021-04-06

## 2021-03-25 ENCOUNTER — TELEPHONE (OUTPATIENT)
Dept: HEMATOLOGY/ONCOLOGY | Facility: HOSPITAL | Age: 61
End: 2021-03-25

## 2021-03-25 NOTE — TELEPHONE ENCOUNTER
Patient called and asked if she could get an IV infusion tomorrow. Please prepare order. Thank you.  Korin

## 2021-03-26 ENCOUNTER — OFFICE VISIT (OUTPATIENT)
Dept: HEMATOLOGY/ONCOLOGY | Facility: HOSPITAL | Age: 61
End: 2021-03-26
Attending: INTERNAL MEDICINE
Payer: COMMERCIAL

## 2021-03-26 VITALS
DIASTOLIC BLOOD PRESSURE: 62 MMHG | OXYGEN SATURATION: 94 % | HEART RATE: 71 BPM | TEMPERATURE: 98 F | RESPIRATION RATE: 16 BRPM | SYSTOLIC BLOOD PRESSURE: 115 MMHG

## 2021-03-26 DIAGNOSIS — D57.00 SICKLE CELL ANEMIA WITH CRISIS (HCC): Primary | ICD-10-CM

## 2021-03-26 PROCEDURE — 96361 HYDRATE IV INFUSION ADD-ON: CPT

## 2021-03-26 PROCEDURE — 96360 HYDRATION IV INFUSION INIT: CPT

## 2021-03-26 NOTE — PROGRESS NOTES
Patient is here for hydration, has history of Sickle cell Anemia. States this morning her Right shoulder and Right hip was popping, when she has those symptoms she needs hydration. IV placed in Right ac/  1L 0.9NS started to infusion over 2 hours.   Nicho Andrade

## 2021-03-27 RX ORDER — WARFARIN SODIUM 5 MG/1
TABLET ORAL
Qty: 45 TABLET | Refills: 1 | Status: SHIPPED | OUTPATIENT
Start: 2021-03-27 | End: 2021-04-21

## 2021-04-06 ENCOUNTER — LAB ENCOUNTER (OUTPATIENT)
Dept: LAB | Age: 61
End: 2021-04-06
Attending: FAMILY MEDICINE
Payer: COMMERCIAL

## 2021-04-06 ENCOUNTER — OFFICE VISIT (OUTPATIENT)
Dept: FAMILY MEDICINE CLINIC | Facility: CLINIC | Age: 61
End: 2021-04-06
Payer: COMMERCIAL

## 2021-04-06 ENCOUNTER — TELEPHONE (OUTPATIENT)
Dept: FAMILY MEDICINE CLINIC | Facility: CLINIC | Age: 61
End: 2021-04-06

## 2021-04-06 VITALS
SYSTOLIC BLOOD PRESSURE: 130 MMHG | HEART RATE: 106 BPM | BODY MASS INDEX: 38.57 KG/M2 | WEIGHT: 215 LBS | HEIGHT: 62.5 IN | DIASTOLIC BLOOD PRESSURE: 80 MMHG

## 2021-04-06 DIAGNOSIS — R07.9 CHEST PAIN, UNSPECIFIED TYPE: ICD-10-CM

## 2021-04-06 DIAGNOSIS — R07.9 CHEST PAIN, UNSPECIFIED TYPE: Primary | ICD-10-CM

## 2021-04-06 PROCEDURE — 3079F DIAST BP 80-89 MM HG: CPT | Performed by: FAMILY MEDICINE

## 2021-04-06 PROCEDURE — 3075F SYST BP GE 130 - 139MM HG: CPT | Performed by: FAMILY MEDICINE

## 2021-04-06 PROCEDURE — 93005 ELECTROCARDIOGRAM TRACING: CPT

## 2021-04-06 PROCEDURE — 93010 ELECTROCARDIOGRAM REPORT: CPT | Performed by: FAMILY MEDICINE

## 2021-04-06 PROCEDURE — 99214 OFFICE O/P EST MOD 30 MIN: CPT | Performed by: FAMILY MEDICINE

## 2021-04-06 PROCEDURE — 3008F BODY MASS INDEX DOCD: CPT | Performed by: FAMILY MEDICINE

## 2021-04-06 NOTE — PROGRESS NOTES
HPI/Subjective:   Patient ID: Raquel Cannon is a 62year old female. Patient f/u er. Denies any abdominal pain any more . Was dg with paniculitis. But the pain is now gone completely. Also for f/u asthma.  Doing well with albuterol takes it no more then taking: Reported on 4/6/2021)       Allergies:  Codeine                 PALPITATIONS  Morphine                PALPITATIONS  Opioid Analgesics       SHORTNESS OF BREATH    Comment:heart races with the use of codeine             Itching and tachycardia  Sulf

## 2021-04-07 ENCOUNTER — ANTI-COAG VISIT (OUTPATIENT)
Dept: INTERNAL MEDICINE CLINIC | Facility: CLINIC | Age: 61
End: 2021-04-07
Payer: COMMERCIAL

## 2021-04-07 DIAGNOSIS — Z79.01 LONG TERM (CURRENT) USE OF ANTICOAGULANTS: ICD-10-CM

## 2021-04-07 DIAGNOSIS — D57.00 HB-SS DISEASE WITH CRISIS (HCC): ICD-10-CM

## 2021-04-07 DIAGNOSIS — D57.20 SICKLE CELL DISEASE, TYPE SC, WITHOUT CRISIS (HCC): ICD-10-CM

## 2021-04-07 DIAGNOSIS — Z51.81 ENCOUNTER FOR THERAPEUTIC DRUG MONITORING: ICD-10-CM

## 2021-04-07 PROCEDURE — 93793 ANTICOAG MGMT PT WARFARIN: CPT

## 2021-04-07 PROCEDURE — 36416 COLLJ CAPILLARY BLOOD SPEC: CPT

## 2021-04-07 PROCEDURE — 85610 PROTHROMBIN TIME: CPT

## 2021-04-13 NOTE — TELEPHONE ENCOUNTER
Dr. Rajni Begum,     Pt spouse is requesting intermittent FMLA to care for pt due to her sickle cell disease flare ups: 1-2 flare ups per month, 1-3 appts per month for the duration of 6 months. Do you support?     Thank you,   Michelle Myers

## 2021-04-14 NOTE — TELEPHONE ENCOUNTER
Dr. Kiel Diaz     Please sign off on form if you agree:Spouse intermittent FMLA  1-2 flare ups per month, 1-3 appts per month for the duration of 6 months due to pt's sickle cell disease.    -Highlight the patient and hit \"Chart\" button.   -In Chart Review,

## 2021-04-16 RX ORDER — HYDROCODONE BITARTRATE AND ACETAMINOPHEN 10; 325 MG/1; MG/1
TABLET ORAL
Qty: 60 TABLET | Refills: 0 | Status: SHIPPED | OUTPATIENT
Start: 2021-04-16 | End: 2021-05-18

## 2021-04-18 ENCOUNTER — LAB ENCOUNTER (OUTPATIENT)
Dept: LAB | Facility: HOSPITAL | Age: 61
End: 2021-04-18
Attending: FAMILY MEDICINE
Payer: COMMERCIAL

## 2021-04-18 DIAGNOSIS — R07.9 CHEST PAIN, UNSPECIFIED TYPE: ICD-10-CM

## 2021-04-21 ENCOUNTER — HOSPITAL ENCOUNTER (OUTPATIENT)
Dept: CV DIAGNOSTICS | Facility: HOSPITAL | Age: 61
Discharge: HOME | End: 2021-04-21
Attending: FAMILY MEDICINE
Payer: COMMERCIAL

## 2021-04-21 DIAGNOSIS — R07.9 CHEST PAIN, UNSPECIFIED TYPE: ICD-10-CM

## 2021-04-21 PROCEDURE — 93350 STRESS TTE ONLY: CPT | Performed by: FAMILY MEDICINE

## 2021-04-21 PROCEDURE — 93017 CV STRESS TEST TRACING ONLY: CPT | Performed by: FAMILY MEDICINE

## 2021-04-21 PROCEDURE — 93018 CV STRESS TEST I&R ONLY: CPT | Performed by: FAMILY MEDICINE

## 2021-04-21 PROCEDURE — 93016 CV STRESS TEST SUPVJ ONLY: CPT | Performed by: FAMILY MEDICINE

## 2021-04-21 RX ORDER — WARFARIN SODIUM 5 MG/1
TABLET ORAL
Qty: 45 TABLET | Refills: 1 | Status: SHIPPED | OUTPATIENT
Start: 2021-04-21 | End: 2021-05-13

## 2021-04-21 NOTE — TELEPHONE ENCOUNTER
Spouse FMLA completed, hand signed Dr Frandy De La Rosa. Forms faxed to [employer] [phone].  Pt is aware

## 2021-05-13 RX ORDER — WARFARIN SODIUM 5 MG/1
TABLET ORAL
Qty: 45 TABLET | Refills: 1 | Status: SHIPPED | OUTPATIENT
Start: 2021-05-13 | End: 2021-06-09

## 2021-05-18 ENCOUNTER — TELEPHONE (OUTPATIENT)
Dept: HEMATOLOGY/ONCOLOGY | Facility: HOSPITAL | Age: 61
End: 2021-05-18

## 2021-05-18 ENCOUNTER — OFFICE VISIT (OUTPATIENT)
Dept: HEMATOLOGY/ONCOLOGY | Facility: HOSPITAL | Age: 61
End: 2021-05-18
Attending: INTERNAL MEDICINE
Payer: COMMERCIAL

## 2021-05-18 VITALS
TEMPERATURE: 99 F | RESPIRATION RATE: 16 BRPM | SYSTOLIC BLOOD PRESSURE: 138 MMHG | OXYGEN SATURATION: 93 % | HEART RATE: 101 BPM | DIASTOLIC BLOOD PRESSURE: 76 MMHG

## 2021-05-18 DIAGNOSIS — D57.00 SICKLE CELL ANEMIA WITH CRISIS (HCC): Primary | ICD-10-CM

## 2021-05-18 PROCEDURE — 96361 HYDRATE IV INFUSION ADD-ON: CPT

## 2021-05-18 PROCEDURE — 96360 HYDRATION IV INFUSION INIT: CPT

## 2021-05-18 RX ORDER — HYDROCODONE BITARTRATE AND ACETAMINOPHEN 10; 325 MG/1; MG/1
TABLET ORAL
Qty: 60 TABLET | Refills: 0 | Status: SHIPPED | OUTPATIENT
Start: 2021-05-18 | End: 2021-06-17

## 2021-05-18 NOTE — TELEPHONE ENCOUNTER
Called Daphne, she is having pain in left leg to hip, requesting hydration for Sickle cell. Apt in infusion at 130 pm per Elvira. Daphne verbalizes understanding. Denies fevers.

## 2021-05-18 NOTE — PROGRESS NOTES
Pt here for hydration; h/o sickle cell anemia with crisis. Reports left hip/leg pain today and denies any other issues or concerns. Norco refill was approved by [employer] this afternoon and pt made aware.      Pt tolerated hydration well without incident or compl

## 2021-05-19 ENCOUNTER — ANTI-COAG VISIT (OUTPATIENT)
Dept: INTERNAL MEDICINE CLINIC | Facility: CLINIC | Age: 61
End: 2021-05-19
Payer: COMMERCIAL

## 2021-05-19 DIAGNOSIS — D57.00 HB-SS DISEASE WITH CRISIS (HCC): ICD-10-CM

## 2021-05-19 DIAGNOSIS — Z51.81 ENCOUNTER FOR THERAPEUTIC DRUG MONITORING: ICD-10-CM

## 2021-05-19 DIAGNOSIS — Z79.01 LONG TERM (CURRENT) USE OF ANTICOAGULANTS: ICD-10-CM

## 2021-05-19 DIAGNOSIS — D57.20 SICKLE CELL DISEASE, TYPE SC, WITHOUT CRISIS (HCC): ICD-10-CM

## 2021-05-19 PROCEDURE — 85610 PROTHROMBIN TIME: CPT

## 2021-05-19 PROCEDURE — 93793 ANTICOAG MGMT PT WARFARIN: CPT

## 2021-05-19 PROCEDURE — 36416 COLLJ CAPILLARY BLOOD SPEC: CPT

## 2021-05-27 ENCOUNTER — TELEPHONE (OUTPATIENT)
Dept: FAMILY MEDICINE CLINIC | Facility: CLINIC | Age: 61
End: 2021-05-27

## 2021-05-27 NOTE — TELEPHONE ENCOUNTER
[place] paperwork was completed and sent back to the patient, but the employer said there isn't enough information on the forms and denied the claim. Please advise patient how to proceed.   Thank you

## 2021-06-04 NOTE — TELEPHONE ENCOUNTER
Called to pt in regards to spouse FMLA being denied - pt did not have exact reason as to why spouse employer denied form, only that she was told that it was \"incomplete\". Reviewing form and every section was completed.  Pt will have spouse call Forms Dept with possible more information

## 2021-06-09 RX ORDER — WARFARIN SODIUM 5 MG/1
TABLET ORAL
Qty: 45 TABLET | Refills: 1 | Status: SHIPPED | OUTPATIENT
Start: 2021-06-09 | End: 2021-07-07

## 2021-06-16 ENCOUNTER — ANTI-COAG VISIT (OUTPATIENT)
Dept: INTERNAL MEDICINE CLINIC | Facility: CLINIC | Age: 61
End: 2021-06-16
Payer: COMMERCIAL

## 2021-06-16 ENCOUNTER — TELEPHONE (OUTPATIENT)
Dept: INTERNAL MEDICINE CLINIC | Facility: CLINIC | Age: 61
End: 2021-06-16

## 2021-06-16 DIAGNOSIS — D57.00 HB-SS DISEASE WITH CRISIS (HCC): ICD-10-CM

## 2021-06-16 DIAGNOSIS — Z79.01 LONG TERM (CURRENT) USE OF ANTICOAGULANTS: ICD-10-CM

## 2021-06-16 DIAGNOSIS — D57.20 SICKLE CELL DISEASE, TYPE SC, WITHOUT CRISIS (HCC): Primary | ICD-10-CM

## 2021-06-16 DIAGNOSIS — D57.20 SICKLE CELL DISEASE, TYPE SC, WITHOUT CRISIS (HCC): ICD-10-CM

## 2021-06-16 DIAGNOSIS — Z51.81 ENCOUNTER FOR THERAPEUTIC DRUG MONITORING: ICD-10-CM

## 2021-06-16 PROCEDURE — 93793 ANTICOAG MGMT PT WARFARIN: CPT

## 2021-06-16 PROCEDURE — 85610 PROTHROMBIN TIME: CPT

## 2021-06-17 ENCOUNTER — TELEPHONE (OUTPATIENT)
Dept: HEMATOLOGY/ONCOLOGY | Facility: HOSPITAL | Age: 61
End: 2021-06-17

## 2021-06-17 ENCOUNTER — OFFICE VISIT (OUTPATIENT)
Dept: HEMATOLOGY/ONCOLOGY | Facility: HOSPITAL | Age: 61
End: 2021-06-17
Attending: INTERNAL MEDICINE
Payer: COMMERCIAL

## 2021-06-17 VITALS
RESPIRATION RATE: 16 BRPM | DIASTOLIC BLOOD PRESSURE: 60 MMHG | SYSTOLIC BLOOD PRESSURE: 130 MMHG | HEART RATE: 67 BPM | TEMPERATURE: 98 F | OXYGEN SATURATION: 94 %

## 2021-06-17 VITALS
RESPIRATION RATE: 16 BRPM | HEART RATE: 67 BPM | DIASTOLIC BLOOD PRESSURE: 60 MMHG | TEMPERATURE: 98 F | SYSTOLIC BLOOD PRESSURE: 130 MMHG | OXYGEN SATURATION: 94 %

## 2021-06-17 DIAGNOSIS — D57.00 HB-SS DISEASE WITH CRISIS (HCC): Primary | ICD-10-CM

## 2021-06-17 DIAGNOSIS — D57.00 SICKLE CELL ANEMIA WITH CRISIS (HCC): Primary | ICD-10-CM

## 2021-06-17 PROCEDURE — 85025 COMPLETE CBC W/AUTO DIFF WBC: CPT

## 2021-06-17 PROCEDURE — 99213 OFFICE O/P EST LOW 20 MIN: CPT | Performed by: NURSE PRACTITIONER

## 2021-06-17 PROCEDURE — 96361 HYDRATE IV INFUSION ADD-ON: CPT

## 2021-06-17 PROCEDURE — 85007 BL SMEAR W/DIFF WBC COUNT: CPT

## 2021-06-17 PROCEDURE — 85045 AUTOMATED RETICULOCYTE COUNT: CPT

## 2021-06-17 PROCEDURE — 96360 HYDRATION IV INFUSION INIT: CPT

## 2021-06-17 PROCEDURE — 85027 COMPLETE CBC AUTOMATED: CPT

## 2021-06-17 RX ORDER — HYDROCODONE BITARTRATE AND ACETAMINOPHEN 10; 325 MG/1; MG/1
TABLET ORAL
Qty: 90 TABLET | Refills: 0 | Status: SHIPPED | OUTPATIENT
Start: 2021-06-17 | End: 2021-08-04

## 2021-06-17 NOTE — TELEPHONE ENCOUNTER
Dr Antwon Bautista Patient - Sickle Cell Disease    Daphne calling to see if she can get in for hydration. She is having pain from her left hip radiating down her leg to her foot. She is rating her pain \"8/10. \"  She took one tab of Norco  at 8 am today.  Her pa

## 2021-06-17 NOTE — PROGRESS NOTES
Pt added on for labs and hydration for SSC. Reports pain starting in knee that radiates thru hip and ankle. Felt better after hydration last month. Being seen by [facility] APRN for ACV today. Discharged home ambulating independently.

## 2021-06-17 NOTE — PROGRESS NOTES
Pt called requesting hydration. Pt with c/o L leg pain radiating to her hip. She has been stable on current regime of hydrocodone 10/325 1/2 -1 tab twice daily. Pain is increased over last few days. She did need hydration x 1 last month.  Will hydrate today

## 2021-06-17 NOTE — TELEPHONE ENCOUNTER
Daphne calling she was in last month for IV fluids she is a sickle patrizia pt she is having knee pain is at a 8 from the hip to the ankle.  mouna

## 2021-06-19 NOTE — TELEPHONE ENCOUNTER
Patient requesting refill on her medication, states she is out and pharmacy needs a new prescription. Please advise.      OXYBUTYNIN CHLORIDE ER 15 MG Oral Tablet 24 Hr 90 tablet 2 12/10/2020    Sig:   TAKE 1 TABLET BY MOUTH EVERY DAY     Route:   (none)

## 2021-06-21 ENCOUNTER — TELEPHONE (OUTPATIENT)
Dept: FAMILY MEDICINE CLINIC | Facility: CLINIC | Age: 61
End: 2021-06-21

## 2021-06-21 RX ORDER — OXYBUTYNIN CHLORIDE 15 MG/1
15 TABLET, EXTENDED RELEASE ORAL DAILY
Qty: 90 TABLET | Refills: 2 | Status: SHIPPED | OUTPATIENT
Start: 2021-06-21

## 2021-06-21 NOTE — TELEPHONE ENCOUNTER
Verified name and . Patient reports that she has been having a flare up of her chronic pain. She reports that she has pain that in her hip, knee, and down to the ankle. She is requesting an office appointment.  Appointment scheduled:  Future Appoint

## 2021-06-24 ENCOUNTER — HOSPITAL ENCOUNTER (OUTPATIENT)
Dept: GENERAL RADIOLOGY | Facility: HOSPITAL | Age: 61
Discharge: HOME | End: 2021-06-24
Attending: FAMILY MEDICINE
Payer: COMMERCIAL

## 2021-06-24 ENCOUNTER — OFFICE VISIT (OUTPATIENT)
Dept: FAMILY MEDICINE CLINIC | Facility: CLINIC | Age: 61
End: 2021-06-24
Payer: COMMERCIAL

## 2021-06-24 VITALS
WEIGHT: 217 LBS | HEIGHT: 62.5 IN | SYSTOLIC BLOOD PRESSURE: 133 MMHG | HEART RATE: 91 BPM | DIASTOLIC BLOOD PRESSURE: 85 MMHG | BODY MASS INDEX: 38.93 KG/M2

## 2021-06-24 DIAGNOSIS — M25.562 LEFT KNEE PAIN, UNSPECIFIED CHRONICITY: Primary | ICD-10-CM

## 2021-06-24 DIAGNOSIS — M25.562 LEFT KNEE PAIN, UNSPECIFIED CHRONICITY: ICD-10-CM

## 2021-06-24 PROCEDURE — 73562 X-RAY EXAM OF KNEE 3: CPT | Performed by: FAMILY MEDICINE

## 2021-06-24 PROCEDURE — 99213 OFFICE O/P EST LOW 20 MIN: CPT | Performed by: FAMILY MEDICINE

## 2021-06-24 PROCEDURE — 3008F BODY MASS INDEX DOCD: CPT | Performed by: FAMILY MEDICINE

## 2021-06-24 PROCEDURE — 3079F DIAST BP 80-89 MM HG: CPT | Performed by: FAMILY MEDICINE

## 2021-06-24 PROCEDURE — 3075F SYST BP GE 130 - 139MM HG: CPT | Performed by: FAMILY MEDICINE

## 2021-06-24 RX ORDER — FOLIC ACID 1 MG/1
1 TABLET ORAL DAILY
Qty: 90 TABLET | Refills: 0 | Status: SHIPPED | OUTPATIENT
Start: 2021-06-24 | End: 2021-09-16

## 2021-06-24 RX ORDER — ALBUTEROL SULFATE 90 UG/1
2 AEROSOL, METERED RESPIRATORY (INHALATION) EVERY 4 HOURS PRN
Qty: 3 EACH | Refills: 1 | Status: SHIPPED | OUTPATIENT
Start: 2021-06-24 | End: 2021-10-26

## 2021-06-24 RX ORDER — FLUTICASONE PROPIONATE 50 MCG
2 SPRAY, SUSPENSION (ML) NASAL DAILY
Qty: 3 EACH | Refills: 0 | Status: SHIPPED | OUTPATIENT
Start: 2021-06-24 | End: 2021-09-16

## 2021-06-24 NOTE — PROGRESS NOTES
HPI/Subjective:   Patient ID: Omar Wadsworth is a 62year old female. Has left knee pain and mild swelling. Worse with walking especially up and down the stairs. Has this for more then a month. Denies any injury.        History/Other:   Review of Systems Analgesics       SHORTNESS OF BREATH    Comment:heart races with the use of codeine             Itching and tachycardia  Sulfa Antibiotics       SHORTNESS OF BREATH    Comment:Rash and tongue swells, itching             Other reaction(s): Swelling – oral/p

## 2021-07-06 ENCOUNTER — ANTI-COAG VISIT (OUTPATIENT)
Dept: INTERNAL MEDICINE CLINIC | Facility: CLINIC | Age: 61
End: 2021-07-06
Payer: COMMERCIAL

## 2021-07-06 DIAGNOSIS — Z51.81 ENCOUNTER FOR THERAPEUTIC DRUG MONITORING: ICD-10-CM

## 2021-07-06 DIAGNOSIS — D57.20 SICKLE CELL DISEASE, TYPE SC, WITHOUT CRISIS (HCC): ICD-10-CM

## 2021-07-06 DIAGNOSIS — D57.00 HB-SS DISEASE WITH CRISIS (HCC): ICD-10-CM

## 2021-07-06 DIAGNOSIS — Z79.01 LONG TERM (CURRENT) USE OF ANTICOAGULANTS: ICD-10-CM

## 2021-07-06 LAB
INR: 2.3 (ref 0.8–1.2)
TEST STRIP EXPIRATION DATE: ABNORMAL DATE

## 2021-07-06 PROCEDURE — 85610 PROTHROMBIN TIME: CPT

## 2021-07-06 PROCEDURE — 93793 ANTICOAG MGMT PT WARFARIN: CPT

## 2021-07-07 RX ORDER — WARFARIN SODIUM 5 MG/1
TABLET ORAL
Qty: 45 TABLET | Refills: 1 | Status: SHIPPED | OUTPATIENT
Start: 2021-07-07 | End: 2021-08-01

## 2021-07-12 DIAGNOSIS — Z51.81 ENCOUNTER FOR MEDICATION MONITORING: ICD-10-CM

## 2021-07-12 DIAGNOSIS — D57.20 SICKLE CELL DISEASE, TYPE SC, WITHOUT CRISIS (HCC): Primary | ICD-10-CM

## 2021-07-14 ENCOUNTER — NURSE ONLY (OUTPATIENT)
Dept: HEMATOLOGY/ONCOLOGY | Facility: HOSPITAL | Age: 61
End: 2021-07-14
Attending: INTERNAL MEDICINE
Payer: COMMERCIAL

## 2021-07-14 VITALS
RESPIRATION RATE: 18 BRPM | TEMPERATURE: 99 F | HEIGHT: 62.5 IN | DIASTOLIC BLOOD PRESSURE: 68 MMHG | WEIGHT: 217 LBS | BODY MASS INDEX: 38.93 KG/M2 | OXYGEN SATURATION: 97 % | SYSTOLIC BLOOD PRESSURE: 138 MMHG | HEART RATE: 66 BPM

## 2021-07-14 DIAGNOSIS — Z51.81 ENCOUNTER FOR MEDICATION MONITORING: ICD-10-CM

## 2021-07-14 DIAGNOSIS — D57.20 SICKLE CELL DISEASE, TYPE SC, WITHOUT CRISIS (HCC): ICD-10-CM

## 2021-07-14 DIAGNOSIS — D57.20 SICKLE CELL DISEASE, TYPE SC, WITHOUT CRISIS (HCC): Primary | ICD-10-CM

## 2021-07-14 LAB
BASOPHILS # BLD AUTO: 0.07 X10(3) UL (ref 0–0.2)
BASOPHILS NFR BLD AUTO: 0.8 %
DEPRECATED RDW RBC AUTO: 49.6 FL (ref 35.1–46.3)
EOSINOPHIL # BLD AUTO: 0.34 X10(3) UL (ref 0–0.7)
EOSINOPHIL NFR BLD AUTO: 3.8 %
ERYTHROCYTE [DISTWIDTH] IN BLOOD BY AUTOMATED COUNT: 16.1 % (ref 11–15)
HCT VFR BLD AUTO: 30.4 %
HGB BLD-MCNC: 11 G/DL
HGB RETIC QN AUTO: 33.3 PG (ref 28.2–36.6)
IMM GRANULOCYTES # BLD AUTO: 0.03 X10(3) UL (ref 0–1)
IMM GRANULOCYTES NFR BLD: 0.3 %
IMM RETICS NFR: 0.32 RATIO (ref 0.1–0.3)
LYMPHOCYTES # BLD AUTO: 2.74 X10(3) UL (ref 1–4)
LYMPHOCYTES NFR BLD AUTO: 30.4 %
MCH RBC QN AUTO: 30.8 PG (ref 26–34)
MCHC RBC AUTO-ENTMCNC: 36.2 G/DL (ref 31–37)
MCV RBC AUTO: 85.2 FL
MONOCYTES # BLD AUTO: 1.11 X10(3) UL (ref 0.1–1)
MONOCYTES NFR BLD AUTO: 12.3 %
NEUTROPHILS # BLD AUTO: 4.72 X10 (3) UL (ref 1.5–7.7)
NEUTROPHILS # BLD AUTO: 4.72 X10(3) UL (ref 1.5–7.7)
NEUTROPHILS NFR BLD AUTO: 52.4 %
PLATELET # BLD AUTO: 338 10(3)UL (ref 150–450)
RBC # BLD AUTO: 3.57 X10(6)UL
RETICS # AUTO: 179.6 X10(3) UL (ref 22.5–147.5)
RETICS/RBC NFR AUTO: 5 %
WBC # BLD AUTO: 9 X10(3) UL (ref 4–11)

## 2021-07-14 PROCEDURE — 36415 COLL VENOUS BLD VENIPUNCTURE: CPT

## 2021-07-14 PROCEDURE — 85025 COMPLETE CBC W/AUTO DIFF WBC: CPT

## 2021-07-14 PROCEDURE — 85045 AUTOMATED RETICULOCYTE COUNT: CPT

## 2021-07-14 PROCEDURE — 99214 OFFICE O/P EST MOD 30 MIN: CPT | Performed by: INTERNAL MEDICINE

## 2021-07-14 NOTE — PROGRESS NOTES
FERNANDO   Claudette Delgado is a 62year old female with dx of Sickle cell disease, type sc, without crisis (hcc)  (primary encounter diagnosis)  Encounter for medication monitoring here to establish care.     Patient had IV fluid hydration on 3x in the past 6 mon Cap Take 1 capsule by mouth daily. • Calcium Citrate-Vitamin D 315-250 MG-UNIT Oral Tab Take 1 tablet by mouth daily. • Vitamin D, Cholecalciferol, (VITAMIN D3) 10 MCG (400 UNIT) Oral Tab Take 1,000 Units by mouth daily.        Allergies:     Codein distress. HEENT: EOMs intact. PERRL. Oropharynx is clear. Non icteric. Neck: No JVD. No palpable lymphadenopathy. Neck is supple. Chest: Clear to auscultation. Heart: Regular rate and rhythm. Abdomen: Soft, non tender with good bowel sounds.   Extremi 0. 34 0.00 - 0.70 x10(3) uL    Basophil Absolute 0.07 0.00 - 0.20 x10(3) uL    Immature Granulocyte Absolute 0.03 0.00 - 1.00 x10(3) uL    Neutrophil % 52.4 %    Lymphocyte % 30.4 %    Monocyte % 12.3 %    Eosinophil % 3.8 %    Basophil % 0.8 %    Immature

## 2021-07-29 ENCOUNTER — TELEPHONE (OUTPATIENT)
Dept: FAMILY MEDICINE CLINIC | Facility: CLINIC | Age: 61
End: 2021-07-29

## 2021-07-29 RX ORDER — AMLODIPINE BESYLATE AND BENAZEPRIL HYDROCHLORIDE 10; 20 MG/1; MG/1
1 CAPSULE ORAL DAILY
Qty: 90 CAPSULE | Refills: 0 | Status: SHIPPED | OUTPATIENT
Start: 2021-07-29 | End: 2021-10-26

## 2021-07-29 NOTE — TELEPHONE ENCOUNTER
[facility] request for medication refill     amLODIPine Besy-Benazepril HCl 10-20 MG Oral Cap

## 2021-08-01 RX ORDER — WARFARIN SODIUM 5 MG/1
TABLET ORAL
Qty: 45 TABLET | Refills: 1 | Status: SHIPPED | OUTPATIENT
Start: 2021-08-01 | End: 2021-08-27

## 2021-08-03 ENCOUNTER — ANTI-COAG VISIT (OUTPATIENT)
Dept: INTERNAL MEDICINE CLINIC | Facility: CLINIC | Age: 61
End: 2021-08-03
Payer: COMMERCIAL

## 2021-08-03 ENCOUNTER — TELEPHONE (OUTPATIENT)
Dept: INTERNAL MEDICINE CLINIC | Facility: CLINIC | Age: 61
End: 2021-08-03

## 2021-08-03 DIAGNOSIS — D57.20 SICKLE CELL DISEASE, TYPE SC, WITHOUT CRISIS (HCC): ICD-10-CM

## 2021-08-03 DIAGNOSIS — Z79.01 LONG TERM (CURRENT) USE OF ANTICOAGULANTS: ICD-10-CM

## 2021-08-03 DIAGNOSIS — Z51.81 ENCOUNTER FOR THERAPEUTIC DRUG MONITORING: ICD-10-CM

## 2021-08-03 DIAGNOSIS — D57.00 HB-SS DISEASE WITH CRISIS (HCC): ICD-10-CM

## 2021-08-03 LAB
INR: 2.9 (ref 0.8–1.2)
TEST STRIP EXPIRATION DATE: ABNORMAL DATE

## 2021-08-03 PROCEDURE — 93793 ANTICOAG MGMT PT WARFARIN: CPT

## 2021-08-03 PROCEDURE — 36416 COLLJ CAPILLARY BLOOD SPEC: CPT

## 2021-08-03 PROCEDURE — 85610 PROTHROMBIN TIME: CPT

## 2021-08-03 NOTE — TELEPHONE ENCOUNTER
Detailed message left (HIPAA verified) for Daphne, that she missed her appointment today, offered to see her later today if she can make it otherwise reschedule for later in the week. Please assist in scheduling when she calls back. Thank you.

## 2021-08-04 DIAGNOSIS — D57.00 SICKLE CELL ANEMIA WITH CRISIS (HCC): ICD-10-CM

## 2021-08-04 RX ORDER — HYDROCODONE BITARTRATE AND ACETAMINOPHEN 10; 325 MG/1; MG/1
TABLET ORAL
Qty: 90 TABLET | Refills: 0 | Status: SHIPPED | OUTPATIENT
Start: 2021-08-04 | End: 2021-09-17

## 2021-08-26 NOTE — TELEPHONE ENCOUNTER
Protocol failed or has No Protocol, please review  Requested Prescriptions   Pending Prescriptions Disp Refills    WARFARIN 5 MG Oral Tab [Pharmacy Med Name: WARFARIN SODIUM 5 MG TABLET] 45 tablet 1     Sig: TAKE 1 TO 1 & 1/2 TABS DAILY OR AS DIRECTED BY L

## 2021-08-27 RX ORDER — WARFARIN SODIUM 5 MG/1
TABLET ORAL
Qty: 45 TABLET | Refills: 1 | Status: SHIPPED | OUTPATIENT
Start: 2021-08-27 | End: 2021-09-22

## 2021-09-08 ENCOUNTER — OFFICE VISIT (OUTPATIENT)
Dept: ORTHOPEDICS CLINIC | Facility: CLINIC | Age: 61
End: 2021-09-08
Payer: COMMERCIAL

## 2021-09-08 VITALS
HEIGHT: 62.5 IN | WEIGHT: 215 LBS | DIASTOLIC BLOOD PRESSURE: 78 MMHG | BODY MASS INDEX: 38.57 KG/M2 | SYSTOLIC BLOOD PRESSURE: 137 MMHG | HEART RATE: 61 BPM

## 2021-09-08 DIAGNOSIS — M17.12 PRIMARY OSTEOARTHRITIS OF LEFT KNEE: Primary | ICD-10-CM

## 2021-09-08 PROCEDURE — 20610 DRAIN/INJ JOINT/BURSA W/O US: CPT | Performed by: PHYSICIAN ASSISTANT

## 2021-09-08 PROCEDURE — 3008F BODY MASS INDEX DOCD: CPT | Performed by: ORTHOPAEDIC SURGERY

## 2021-09-08 PROCEDURE — 99243 OFF/OP CNSLTJ NEW/EST LOW 30: CPT | Performed by: ORTHOPAEDIC SURGERY

## 2021-09-08 PROCEDURE — 3078F DIAST BP <80 MM HG: CPT | Performed by: ORTHOPAEDIC SURGERY

## 2021-09-08 PROCEDURE — 3075F SYST BP GE 130 - 139MM HG: CPT | Performed by: ORTHOPAEDIC SURGERY

## 2021-09-08 RX ORDER — TRIAMCINOLONE ACETONIDE 40 MG/ML
40 INJECTION, SUSPENSION INTRA-ARTICULAR; INTRAMUSCULAR ONCE
Status: COMPLETED | OUTPATIENT
Start: 2021-09-08 | End: 2021-09-08

## 2021-09-08 RX ORDER — FLUTICASONE FUROATE 100 UG/1
1 POWDER RESPIRATORY (INHALATION) DAILY
COMMUNITY
Start: 2021-03-04 | End: 2021-09-08 | Stop reason: ALTCHOICE

## 2021-09-08 RX ADMIN — TRIAMCINOLONE ACETONIDE 40 MG: 40 INJECTION, SUSPENSION INTRA-ARTICULAR; INTRAMUSCULAR at 09:55:00

## 2021-09-08 NOTE — PROGRESS NOTES
NURSING INTAKE COMMENTS: Patient presents with:  Knee Pain: L knee pain, onset 4 month ago. Patient states swelling as well. Denies any numbness, tingling or trauma. Pain scale 8/10.        HPI: This 62year old female presents today with complaints of left PALPITATIONS  Opioid Analgesics       SHORTNESS OF BREATH    Comment:heart races with the use of codeine             Itching and tachycardia  Sulfa Antibiotics       SHORTNESS OF BREATH    Comment:Rash and tongue swells, itching             Other reaction( Constitutional: appears well hydrated, alert and responsive, no acute distress noted  Extremities: Normal gait. Moderate effusion in the left knee. Full extension 120 degrees of flexion, no instability, no crepitus. Imaging: No results found.      [employer]

## 2021-09-08 NOTE — PROGRESS NOTES
Per verbal order from Dr. Poncho Galarza, draw up and 4ml of 0.5% Marcaine and 1ml of Kenalog 40 for injection into left knee   Meme Minium, [facility]  Patient provided education handout for cortisone injection.    Patient left office prior to obtaining post injection kathrine

## 2021-09-10 RX ORDER — LIDOCAINE 50 MG/G
PATCH TOPICAL
Qty: 60 PATCH | Refills: 2 | Status: SHIPPED | OUTPATIENT
Start: 2021-09-10 | End: 2022-02-01

## 2021-09-13 ENCOUNTER — NURSE TRIAGE (OUTPATIENT)
Dept: FAMILY MEDICINE CLINIC | Facility: CLINIC | Age: 61
End: 2021-09-13

## 2021-09-13 DIAGNOSIS — R39.89 URINARY PROBLEM: Primary | ICD-10-CM

## 2021-09-13 NOTE — TELEPHONE ENCOUNTER
Action Requested: Summary for Provider     []  Critical Lab, Recommendations Needed  [] Need Additional Advice  []   FYI    []   Need Orders  [] Need Medications Sent to Pharmacy  []  Other     SUMMARY:  Has burning sensation with voids and increase in voi

## 2021-09-13 NOTE — TELEPHONE ENCOUNTER
Taras Fontana MD  Em Rn Triage 1 hour ago (11:55 AM)     Test ordered. Increase fluids     Message text      RN informed patient of provider's message. Patient verbalizes understanding and is agreeable to instructions.

## 2021-09-14 ENCOUNTER — ANTI-COAG VISIT (OUTPATIENT)
Dept: INTERNAL MEDICINE CLINIC | Facility: CLINIC | Age: 61
End: 2021-09-14
Payer: COMMERCIAL

## 2021-09-14 ENCOUNTER — LAB ENCOUNTER (OUTPATIENT)
Dept: LAB | Age: 61
End: 2021-09-14
Attending: FAMILY MEDICINE
Payer: COMMERCIAL

## 2021-09-14 DIAGNOSIS — Z51.81 ENCOUNTER FOR THERAPEUTIC DRUG MONITORING: ICD-10-CM

## 2021-09-14 DIAGNOSIS — D57.20 SICKLE CELL DISEASE, TYPE SC, WITHOUT CRISIS (HCC): ICD-10-CM

## 2021-09-14 DIAGNOSIS — Z79.01 LONG TERM (CURRENT) USE OF ANTICOAGULANTS: ICD-10-CM

## 2021-09-14 DIAGNOSIS — R39.89 URINE TROUBLES: Primary | ICD-10-CM

## 2021-09-14 DIAGNOSIS — D57.00 HB-SS DISEASE WITH CRISIS (HCC): ICD-10-CM

## 2021-09-14 LAB
INR: 3.4 (ref 0.8–1.2)
TEST STRIP EXPIRATION DATE: ABNORMAL DATE

## 2021-09-14 PROCEDURE — 87086 URINE CULTURE/COLONY COUNT: CPT

## 2021-09-14 PROCEDURE — 85610 PROTHROMBIN TIME: CPT

## 2021-09-14 PROCEDURE — 87186 SC STD MICRODIL/AGAR DIL: CPT

## 2021-09-14 PROCEDURE — 93793 ANTICOAG MGMT PT WARFARIN: CPT

## 2021-09-14 PROCEDURE — 87088 URINE BACTERIA CULTURE: CPT

## 2021-09-16 ENCOUNTER — TELEPHONE (OUTPATIENT)
Dept: INTERNAL MEDICINE CLINIC | Facility: CLINIC | Age: 61
End: 2021-09-16

## 2021-09-16 RX ORDER — CIPROFLOXACIN 250 MG/1
250 TABLET, FILM COATED ORAL 2 TIMES DAILY
Qty: 14 TABLET | Refills: 0 | Status: SHIPPED | OUTPATIENT
Start: 2021-09-16 | End: 2021-09-23

## 2021-09-16 RX ORDER — FOLIC ACID 1 MG/1
TABLET ORAL
Qty: 90 TABLET | Refills: 0 | Status: SHIPPED | OUTPATIENT
Start: 2021-09-16 | End: 2021-10-26

## 2021-09-16 RX ORDER — FLUTICASONE PROPIONATE 50 MCG
SPRAY, SUSPENSION (ML) NASAL
Qty: 48 ML | Refills: 0 | Status: SHIPPED | OUTPATIENT
Start: 2021-09-16 | End: 2021-09-20

## 2021-09-16 NOTE — TELEPHONE ENCOUNTER
Pt calling to f/u on her urine results. Final results back at 7am today and + for e-coli, pharmacy and allergies verified. Please advise.

## 2021-09-16 NOTE — TELEPHONE ENCOUNTER
Refill passed per [facility] protocol.   Requested Prescriptions   Pending Prescriptions Disp Refills    FLUTICASONE PROPIONATE 50 MCG/ACT Nasal Suspension [Pharmacy Med Name: FLUTICASONE PROP 50 MCG SPRAY] 48 mL 0     Sig: SPRAY 2 SPRAYS INTO EACH NOS

## 2021-09-16 NOTE — TELEPHONE ENCOUNTER
MD Alexander Sarmiento, RN; Em Rn Triage 2 hours ago (3:38 PM)     [facility] faxed    Message text      RN spoke with patient . RN informed patient of provider's message below and provided education. Denies other questions at this time.

## 2021-09-17 DIAGNOSIS — D57.00 SICKLE CELL ANEMIA WITH CRISIS (HCC): ICD-10-CM

## 2021-09-17 RX ORDER — HYDROCODONE BITARTRATE AND ACETAMINOPHEN 10; 325 MG/1; MG/1
TABLET ORAL
Qty: 90 TABLET | Refills: 0 | Status: SHIPPED | OUTPATIENT
Start: 2021-09-17 | End: 2021-10-25

## 2021-09-20 RX ORDER — FLUTICASONE PROPIONATE 50 MCG
SPRAY, SUSPENSION (ML) NASAL
Qty: 48 ML | Refills: 0 | Status: SHIPPED | OUTPATIENT
Start: 2021-09-20 | End: 2022-01-31

## 2021-09-22 RX ORDER — WARFARIN SODIUM 5 MG/1
TABLET ORAL
Qty: 45 TABLET | Refills: 1 | Status: SHIPPED | OUTPATIENT
Start: 2021-09-22 | End: 2021-10-15

## 2021-10-15 RX ORDER — WARFARIN SODIUM 5 MG/1
TABLET ORAL
Qty: 45 TABLET | Refills: 1 | Status: SHIPPED | OUTPATIENT
Start: 2021-10-15 | End: 2021-11-10

## 2021-10-15 NOTE — TELEPHONE ENCOUNTER
Please review; protocol failed.     Requested Prescriptions   Pending Prescriptions Disp Refills    WARFARIN 5 MG Oral Tab [Pharmacy Med Name: WARFARIN SODIUM 5 MG TABLET] 45 tablet 1     Sig: TAKE 1 TO 1 AND 1/2 TABLET BY MOUTH EVERY DAY OR AS DIRECTED BY

## 2021-10-21 ENCOUNTER — TELEPHONE (OUTPATIENT)
Dept: HEMATOLOGY/ONCOLOGY | Facility: HOSPITAL | Age: 61
End: 2021-10-21

## 2021-10-21 NOTE — TELEPHONE ENCOUNTER
Called Daphne with infusion apt tomorrow at 1030 am for hydration for Sickle Cell pain. She verbalizes understanding.

## 2021-10-21 NOTE — TELEPHONE ENCOUNTER
Daphne called Sickle Cell pain to arms and legs, using Norco for pain, requesting hydration tomorrow(can't come today no transportation)  Denies fevers, no sob or chest pain, denies nausea or vomiting, no diarrhea, she is eating and drinking and no issues

## 2021-10-22 ENCOUNTER — OFFICE VISIT (OUTPATIENT)
Dept: HEMATOLOGY/ONCOLOGY | Facility: HOSPITAL | Age: 61
End: 2021-10-22
Attending: INTERNAL MEDICINE
Payer: COMMERCIAL

## 2021-10-22 VITALS
TEMPERATURE: 98 F | DIASTOLIC BLOOD PRESSURE: 58 MMHG | RESPIRATION RATE: 18 BRPM | HEART RATE: 66 BPM | OXYGEN SATURATION: 94 % | SYSTOLIC BLOOD PRESSURE: 117 MMHG

## 2021-10-22 DIAGNOSIS — D57.00 SICKLE CELL ANEMIA WITH CRISIS (HCC): Primary | ICD-10-CM

## 2021-10-22 PROCEDURE — 96360 HYDRATION IV INFUSION INIT: CPT

## 2021-10-22 NOTE — PROGRESS NOTES
Pt here for hydration; h/o sickle cell anemia with crisis. Reports left hip/leg pain today and denies any other issues or concerns. Pt tolerated hydration well without incident or complaint and states improvement in left hip/leg pain.  Pt discharged to h

## 2021-10-25 DIAGNOSIS — D57.00 SICKLE CELL ANEMIA WITH CRISIS (HCC): ICD-10-CM

## 2021-10-25 RX ORDER — HYDROCODONE BITARTRATE AND ACETAMINOPHEN 10; 325 MG/1; MG/1
TABLET ORAL
Qty: 90 TABLET | Refills: 0 | Status: SHIPPED | OUTPATIENT
Start: 2021-10-25 | End: 2021-12-20

## 2021-10-25 NOTE — TELEPHONE ENCOUNTER
Dr Sabrina Anthony patient - Sickle Cell Disease    Daphne called asking if she can get IV hydration tomorrow. I asked her to please hold. I updated Matthew Frank and transferred the call to her.

## 2021-10-25 NOTE — TELEPHONE ENCOUNTER
Patient called to refill HYDROcodone-acetaminophem  mg Oral Tab. Patient was also questioning some labs. Please call her back when able.

## 2021-10-26 ENCOUNTER — ANTI-COAG VISIT (OUTPATIENT)
Dept: INTERNAL MEDICINE CLINIC | Facility: CLINIC | Age: 61
End: 2021-10-26
Payer: COMMERCIAL

## 2021-10-26 ENCOUNTER — OFFICE VISIT (OUTPATIENT)
Dept: HEMATOLOGY/ONCOLOGY | Facility: HOSPITAL | Age: 61
End: 2021-10-26
Attending: INTERNAL MEDICINE
Payer: COMMERCIAL

## 2021-10-26 VITALS
OXYGEN SATURATION: 92 % | DIASTOLIC BLOOD PRESSURE: 65 MMHG | RESPIRATION RATE: 18 BRPM | SYSTOLIC BLOOD PRESSURE: 131 MMHG | HEART RATE: 82 BPM | TEMPERATURE: 98 F

## 2021-10-26 DIAGNOSIS — Z79.01 LONG TERM (CURRENT) USE OF ANTICOAGULANTS: ICD-10-CM

## 2021-10-26 DIAGNOSIS — D57.00 SICKLE CELL ANEMIA WITH CRISIS (HCC): Primary | ICD-10-CM

## 2021-10-26 DIAGNOSIS — D57.20 SICKLE CELL DISEASE, TYPE SC, WITHOUT CRISIS (HCC): ICD-10-CM

## 2021-10-26 DIAGNOSIS — Z51.81 ENCOUNTER FOR THERAPEUTIC DRUG MONITORING: ICD-10-CM

## 2021-10-26 DIAGNOSIS — D57.00 HB-SS DISEASE WITH CRISIS (HCC): ICD-10-CM

## 2021-10-26 PROCEDURE — 96360 HYDRATION IV INFUSION INIT: CPT

## 2021-10-26 PROCEDURE — 85610 PROTHROMBIN TIME: CPT

## 2021-10-26 PROCEDURE — 93793 ANTICOAG MGMT PT WARFARIN: CPT

## 2021-10-26 RX ORDER — AMLODIPINE BESYLATE AND BENAZEPRIL HYDROCHLORIDE 10; 20 MG/1; MG/1
CAPSULE ORAL
Qty: 90 CAPSULE | Refills: 0 | Status: SHIPPED | OUTPATIENT
Start: 2021-10-26 | End: 2022-01-19

## 2021-10-26 RX ORDER — ALBUTEROL SULFATE 90 UG/1
AEROSOL, METERED RESPIRATORY (INHALATION)
Qty: 25.5 EACH | Refills: 1 | Status: SHIPPED | OUTPATIENT
Start: 2021-10-26

## 2021-10-26 RX ORDER — FOLIC ACID 1 MG/1
TABLET ORAL
Qty: 90 TABLET | Refills: 0 | Status: SHIPPED | OUTPATIENT
Start: 2021-10-26 | End: 2022-01-13

## 2021-10-26 NOTE — PROGRESS NOTES
Pt to infusion area for hydration to treat sickle cell crisis. States she felt much better after her hydration a couple days ago. Continues to have some pain in her legs and arms. PIV started right arm. One liter of saline given over 1 hour.  PIV removed,

## 2021-11-10 RX ORDER — WARFARIN SODIUM 5 MG/1
TABLET ORAL
Qty: 45 TABLET | Refills: 1 | Status: SHIPPED | OUTPATIENT
Start: 2021-11-10 | End: 2021-12-02

## 2021-12-02 RX ORDER — WARFARIN SODIUM 5 MG/1
TABLET ORAL
Qty: 135 TABLET | Refills: 1 | Status: SHIPPED | OUTPATIENT
Start: 2021-12-02

## 2021-12-02 NOTE — TELEPHONE ENCOUNTER
Please review. Protocol failed / No protocol.   Requested Prescriptions   Pending Prescriptions Disp Refills    WARFARIN 5 MG Oral Tab [Pharmacy Med Name: WARFARIN SODIUM 5 MG TABLET] 45 tablet 1     Sig: TAKE 1 TO 1 AND 1/2 TABLET BY MOUTH EVERY DAY OR AS

## 2021-12-07 ENCOUNTER — ANTI-COAG VISIT (OUTPATIENT)
Dept: INTERNAL MEDICINE CLINIC | Facility: CLINIC | Age: 61
End: 2021-12-07
Payer: COMMERCIAL

## 2021-12-07 DIAGNOSIS — D57.00 HB-SS DISEASE WITH CRISIS (HCC): ICD-10-CM

## 2021-12-07 DIAGNOSIS — Z79.01 LONG TERM (CURRENT) USE OF ANTICOAGULANTS: ICD-10-CM

## 2021-12-07 DIAGNOSIS — D57.20 SICKLE CELL DISEASE, TYPE SC, WITHOUT CRISIS (HCC): ICD-10-CM

## 2021-12-07 DIAGNOSIS — Z51.81 ENCOUNTER FOR THERAPEUTIC DRUG MONITORING: ICD-10-CM

## 2021-12-07 PROCEDURE — 85610 PROTHROMBIN TIME: CPT

## 2021-12-07 PROCEDURE — 93793 ANTICOAG MGMT PT WARFARIN: CPT

## 2021-12-13 DIAGNOSIS — D57.00 SICKLE CELL ANEMIA WITH CRISIS (HCC): ICD-10-CM

## 2021-12-16 DIAGNOSIS — D57.00 SICKLE CELL ANEMIA WITH CRISIS (HCC): ICD-10-CM

## 2021-12-16 RX ORDER — HYDROCODONE BITARTRATE AND ACETAMINOPHEN 10; 325 MG/1; MG/1
TABLET ORAL
Qty: 90 TABLET | Refills: 0 | OUTPATIENT
Start: 2021-12-16

## 2021-12-17 RX ORDER — HYDROCODONE BITARTRATE AND ACETAMINOPHEN 10; 325 MG/1; MG/1
TABLET ORAL
Qty: 90 TABLET | Refills: 0 | OUTPATIENT
Start: 2021-12-17

## 2021-12-20 RX ORDER — HYDROCODONE BITARTRATE AND ACETAMINOPHEN 10; 325 MG/1; MG/1
TABLET ORAL
Qty: 90 TABLET | Refills: 0 | Status: SHIPPED | OUTPATIENT
Start: 2021-12-20 | End: 2022-01-13

## 2021-12-20 NOTE — TELEPHONE ENCOUNTER
Patient is completely out of medication Hydrocondone-acetaminophen  oral tab. Patient took last tablet this morning.

## 2022-01-13 ENCOUNTER — OFFICE VISIT (OUTPATIENT)
Dept: HEMATOLOGY/ONCOLOGY | Facility: HOSPITAL | Age: 62
End: 2022-01-13
Attending: INTERNAL MEDICINE
Payer: COMMERCIAL

## 2022-01-13 VITALS
HEART RATE: 66 BPM | SYSTOLIC BLOOD PRESSURE: 133 MMHG | OXYGEN SATURATION: 95 % | WEIGHT: 209.38 LBS | BODY MASS INDEX: 37.57 KG/M2 | RESPIRATION RATE: 18 BRPM | TEMPERATURE: 98 F | HEIGHT: 62.5 IN | DIASTOLIC BLOOD PRESSURE: 66 MMHG

## 2022-01-13 DIAGNOSIS — D57.20 SICKLE CELL DISEASE, TYPE SC, WITHOUT CRISIS (HCC): Primary | ICD-10-CM

## 2022-01-13 DIAGNOSIS — G89.29 OTHER CHRONIC PAIN: ICD-10-CM

## 2022-01-13 DIAGNOSIS — D57.20 SICKLE CELL DISEASE, TYPE SC, WITHOUT CRISIS (HCC): ICD-10-CM

## 2022-01-13 DIAGNOSIS — Z51.81 ENCOUNTER FOR MEDICATION MONITORING: ICD-10-CM

## 2022-01-13 DIAGNOSIS — Z79.891 LONG TERM (CURRENT) USE OF OPIATE ANALGESIC: ICD-10-CM

## 2022-01-13 DIAGNOSIS — D57.00 SICKLE CELL ANEMIA WITH CRISIS (HCC): ICD-10-CM

## 2022-01-13 LAB
BASOPHILS # BLD AUTO: 0.08 X10(3) UL (ref 0–0.2)
BASOPHILS NFR BLD AUTO: 0.9 %
DEPRECATED RDW RBC AUTO: 49.9 FL (ref 35.1–46.3)
EOSINOPHIL # BLD AUTO: 0.32 X10(3) UL (ref 0–0.7)
EOSINOPHIL NFR BLD AUTO: 3.8 %
ERYTHROCYTE [DISTWIDTH] IN BLOOD BY AUTOMATED COUNT: 16.1 % (ref 11–15)
HCT VFR BLD AUTO: 32.3 %
HGB BLD-MCNC: 11.7 G/DL
HGB RETIC QN AUTO: 31.9 PG (ref 28.2–36.6)
IMM GRANULOCYTES # BLD AUTO: 0.02 X10(3) UL (ref 0–1)
IMM GRANULOCYTES NFR BLD: 0.2 %
IMM RETICS NFR: 0.21 RATIO (ref 0.1–0.3)
LYMPHOCYTES # BLD AUTO: 2.8 X10(3) UL (ref 1–4)
LYMPHOCYTES NFR BLD AUTO: 33 %
MCH RBC QN AUTO: 30.8 PG (ref 26–34)
MCHC RBC AUTO-ENTMCNC: 36.2 G/DL (ref 31–37)
MCV RBC AUTO: 85 FL
MONOCYTES # BLD AUTO: 1.15 X10(3) UL (ref 0.1–1)
MONOCYTES NFR BLD AUTO: 13.6 %
NEUTROPHILS # BLD AUTO: 4.11 X10 (3) UL (ref 1.5–7.7)
NEUTROPHILS # BLD AUTO: 4.11 X10(3) UL (ref 1.5–7.7)
NEUTROPHILS NFR BLD AUTO: 48.5 %
PLATELET # BLD AUTO: 336 10(3)UL (ref 150–450)
PLATELET MORPHOLOGY: NORMAL
RBC # BLD AUTO: 3.8 X10(6)UL
RETICS # AUTO: 132.2 X10(3) UL (ref 22.5–147.5)
RETICS/RBC NFR AUTO: 3.5 %
WBC # BLD AUTO: 8.5 X10(3) UL (ref 4–11)

## 2022-01-13 PROCEDURE — 36415 COLL VENOUS BLD VENIPUNCTURE: CPT

## 2022-01-13 PROCEDURE — 85045 AUTOMATED RETICULOCYTE COUNT: CPT

## 2022-01-13 PROCEDURE — 99214 OFFICE O/P EST MOD 30 MIN: CPT | Performed by: INTERNAL MEDICINE

## 2022-01-13 PROCEDURE — 85025 COMPLETE CBC W/AUTO DIFF WBC: CPT

## 2022-01-13 RX ORDER — HYDROCODONE BITARTRATE AND ACETAMINOPHEN 10; 325 MG/1; MG/1
TABLET ORAL
Qty: 90 TABLET | Refills: 0 | Status: SHIPPED | OUTPATIENT
Start: 2022-01-20

## 2022-01-13 RX ORDER — FOLIC ACID 1 MG/1
1 TABLET ORAL DAILY
Qty: 90 TABLET | Refills: 3 | Status: SHIPPED | OUTPATIENT
Start: 2022-01-13

## 2022-01-13 NOTE — PROGRESS NOTES
FERNANDO   Stevie Shoemaker is a 62year old female with dx of Sickle cell disease, type sc, without crisis (hcc)  (primary encounter diagnosis) here to establish care. Patient had IV fluid hydration on 2x in the past 6 months.       States she has been drinkin ONTO THE SKIN AT NIGHT PLACE FOR 12 HOURS ON AND 12 HOURS OFF 60 patch 2   • Oxybutynin Chloride ER 15 MG Oral Tablet 24 Hr Take 1 tablet (15 mg total) by mouth daily.  90 tablet 2   • Calcium Citrate-Vitamin D 315-250 MG-UNIT Oral Tab Take 1 tablet by mout (209 lb 6.4 oz)   SpO2 95%   BMI 37.69 kg/m²   Wt Readings from Last 6 Encounters:  01/13/22 : 95 kg (209 lb 6.4 oz)  09/08/21 : 97.5 kg (215 lb)  07/14/21 : 98.4 kg (217 lb)  06/24/21 : 98.4 kg (217 lb)  04/06/21 : 97.5 kg (215 lb)  02/02/21 : 97.1 kg (21

## 2022-01-18 ENCOUNTER — ANTI-COAG VISIT (OUTPATIENT)
Dept: INTERNAL MEDICINE CLINIC | Facility: CLINIC | Age: 62
End: 2022-01-18
Payer: COMMERCIAL

## 2022-01-18 DIAGNOSIS — Z51.81 ENCOUNTER FOR THERAPEUTIC DRUG MONITORING: ICD-10-CM

## 2022-01-18 DIAGNOSIS — D57.00 HB-SS DISEASE WITH CRISIS (HCC): ICD-10-CM

## 2022-01-18 DIAGNOSIS — D57.20 SICKLE CELL DISEASE, TYPE SC, WITHOUT CRISIS (HCC): ICD-10-CM

## 2022-01-18 DIAGNOSIS — Z79.01 LONG TERM (CURRENT) USE OF ANTICOAGULANTS: ICD-10-CM

## 2022-01-18 LAB — INR: 3 (ref 0.8–1.2)

## 2022-01-18 PROCEDURE — 93793 ANTICOAG MGMT PT WARFARIN: CPT

## 2022-01-18 PROCEDURE — 85610 PROTHROMBIN TIME: CPT

## 2022-01-19 RX ORDER — AMLODIPINE BESYLATE AND BENAZEPRIL HYDROCHLORIDE 10; 20 MG/1; MG/1
1 CAPSULE ORAL DAILY
Qty: 30 CAPSULE | Refills: 0 | Status: SHIPPED | OUTPATIENT
Start: 2022-01-19 | End: 2022-01-31

## 2022-01-19 NOTE — TELEPHONE ENCOUNTER
Refill passed per [facility] protocol. Requested Prescriptions   Pending Prescriptions Disp Refills    AMLODIPINE BESY-BENAZEPRIL HCL 10-20 MG Oral Cap [Pharmacy Med Name: AMLODIPINE-BENAZEPRIL 10-20 MG] 90 capsule 0     Sig: TAKE 1 CAPSULE BY MOUTH EVERY DAY        Hypertensive Medications Protocol Failed - 1/19/2022 12:12 PM        Failed - CMP or BMP in past 12 months        Failed - Appointment in past 6 or next 3 months        Passed - GFR  > 50     Lab Results   Component Value Date    GFRAA 77 10/19/2020                       Future Appointments         [facility]    [facility]     In 5 months Stacey 25 Hematology Oncology LABS: CBC,retic    In 5 months Enrique Loving 19 Hematology Oncology FOLLOW UP VISIT. CL  6M            Recent Outpatient Visits              6 days ago Sickle cell disease, type sc, without crisis [facility])    [facility] Hematology Oncology    Nurse Only    6 days ago Sickle cell disease, type sc, without crisis [facility])    [facility] Hematology Oncology Cindy Alcantar MD    Office Visit    2 months ago Sickle cell anemia with crisis [facility])    [address] Visit    2 months ago Sickle cell anemia with crisis ([address]    [address] Visit    4 months ago Primary osteoarthritis of left knee    [facility] for Kel Dupont MD    Office Visit

## 2022-01-31 ENCOUNTER — TELEPHONE (OUTPATIENT)
Dept: FAMILY MEDICINE CLINIC | Facility: CLINIC | Age: 62
End: 2022-01-31

## 2022-01-31 DIAGNOSIS — Z12.31 ENCOUNTER FOR MAMMOGRAM TO ESTABLISH BASELINE MAMMOGRAM: Primary | ICD-10-CM

## 2022-01-31 RX ORDER — AMLODIPINE BESYLATE AND BENAZEPRIL HYDROCHLORIDE 10; 20 MG/1; MG/1
CAPSULE ORAL
Qty: 30 CAPSULE | Refills: 0 | Status: SHIPPED | OUTPATIENT
Start: 2022-01-31

## 2022-01-31 RX ORDER — FLUTICASONE PROPIONATE 50 MCG
SPRAY, SUSPENSION (ML) NASAL
Qty: 48 ML | Refills: 1 | Status: SHIPPED | OUTPATIENT
Start: 2022-01-31

## 2022-01-31 NOTE — TELEPHONE ENCOUNTER
Refill passed per [place] [facility] protocol   Requested Prescriptions   Pending Prescriptions Disp Refills    FLUTICASONE PROPIONATE 50 MCG/ACT Nasal Suspension [Pharmacy Med Name: FLUTICASONE PROP 50 MCG SPRAY] 48 mL 0     Sig: SPRAY 2 SPRAYS INTO EACH NOS

## 2022-01-31 NOTE — TELEPHONE ENCOUNTER
Accidentally routed to wrong pool. Re routed to Dr. Brenda Mclaughlin. 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

## 2022-01-31 NOTE — TELEPHONE ENCOUNTER
Dr. Enma Faulkner, patient is requesting a mammogram order. Last exam was 1/15/21:  Your mammogram was normal after comparing with previous films.    Next one is due in 1 year   Written by Sj Herbert MD on 2/11/2021  1:22 PM CST

## 2022-02-02 RX ORDER — LIDOCAINE 50 MG/G
PATCH TOPICAL
Qty: 60 PATCH | Refills: 2 | Status: SHIPPED | OUTPATIENT
Start: 2022-02-02 | End: 2022-06-20

## 2022-02-18 ENCOUNTER — OFFICE VISIT (OUTPATIENT)
Dept: FAMILY MEDICINE CLINIC | Facility: CLINIC | Age: 62
End: 2022-02-18
Payer: COMMERCIAL

## 2022-02-18 ENCOUNTER — LAB ENCOUNTER (OUTPATIENT)
Dept: LAB | Age: 62
End: 2022-02-18
Attending: FAMILY MEDICINE
Payer: COMMERCIAL

## 2022-02-18 VITALS
SYSTOLIC BLOOD PRESSURE: 129 MMHG | WEIGHT: 207 LBS | DIASTOLIC BLOOD PRESSURE: 81 MMHG | HEART RATE: 84 BPM | BODY MASS INDEX: 37.14 KG/M2 | HEIGHT: 62.5 IN

## 2022-02-18 DIAGNOSIS — R10.9 ABDOMINAL PAIN, UNSPECIFIED ABDOMINAL LOCATION: Primary | ICD-10-CM

## 2022-02-18 LAB
ALBUMIN SERPL-MCNC: 3.9 G/DL (ref 3.4–5)
ALBUMIN/GLOB SERPL: 1 {RATIO} (ref 1–2)
ALP LIVER SERPL-CCNC: 75 U/L
ALT SERPL-CCNC: 28 U/L
AMYLASE SERPL-CCNC: 53 U/L (ref 25–115)
ANION GAP SERPL CALC-SCNC: 7 MMOL/L (ref 0–18)
AST SERPL-CCNC: 33 U/L (ref 15–37)
BASOPHILS # BLD AUTO: 0.07 X10(3) UL (ref 0–0.2)
BASOPHILS NFR BLD AUTO: 0.8 %
BILIRUB SERPL-MCNC: 1.9 MG/DL (ref 0.1–2)
BUN BLD-MCNC: 8 MG/DL (ref 7–18)
BUN/CREAT SERPL: 9 (ref 10–20)
CALCIUM BLD-MCNC: 10.1 MG/DL (ref 8.5–10.1)
CHLORIDE SERPL-SCNC: 106 MMOL/L (ref 98–112)
CO2 SERPL-SCNC: 26 MMOL/L (ref 21–32)
CREAT BLD-MCNC: 0.89 MG/DL
DEPRECATED RDW RBC AUTO: 50.8 FL (ref 35.1–46.3)
EOSINOPHIL # BLD AUTO: 0.32 X10(3) UL (ref 0–0.7)
EOSINOPHIL NFR BLD AUTO: 3.5 %
ERYTHROCYTE [DISTWIDTH] IN BLOOD BY AUTOMATED COUNT: 16.3 % (ref 11–15)
FASTING STATUS PATIENT QL REPORTED: YES
GLOBULIN PLAS-MCNC: 4 G/DL (ref 2.8–4.4)
GLUCOSE BLD-MCNC: 94 MG/DL (ref 70–99)
HCT VFR BLD AUTO: 33.9 %
HGB BLD-MCNC: 12.3 G/DL
IMM GRANULOCYTES # BLD AUTO: 0.05 X10(3) UL (ref 0–1)
IMM GRANULOCYTES NFR BLD: 0.5 %
LIPASE SERPL-CCNC: 87 U/L (ref 73–393)
LYMPHOCYTES # BLD AUTO: 2.66 X10(3) UL (ref 1–4)
LYMPHOCYTES NFR BLD AUTO: 29.1 %
MCH RBC QN AUTO: 31 PG (ref 26–34)
MCHC RBC AUTO-ENTMCNC: 36.3 G/DL (ref 31–37)
MCV RBC AUTO: 85.4 FL
MONOCYTES # BLD AUTO: 0.99 X10(3) UL (ref 0.1–1)
MONOCYTES NFR BLD AUTO: 10.8 %
NEUTROPHILS # BLD AUTO: 5.06 X10 (3) UL (ref 1.5–7.7)
NEUTROPHILS # BLD AUTO: 5.06 X10(3) UL (ref 1.5–7.7)
NEUTROPHILS NFR BLD AUTO: 55.3 %
OSMOLALITY SERPL CALC.SUM OF ELEC: 286 MOSM/KG (ref 275–295)
PLATELET # BLD AUTO: 381 10(3)UL (ref 150–450)
POTASSIUM SERPL-SCNC: 4.2 MMOL/L (ref 3.5–5.1)
PROT SERPL-MCNC: 7.9 G/DL (ref 6.4–8.2)
RBC # BLD AUTO: 3.97 X10(6)UL
SODIUM SERPL-SCNC: 139 MMOL/L (ref 136–145)
TSI SER-ACNC: 1.48 MIU/ML (ref 0.36–3.74)
WBC # BLD AUTO: 9.2 X10(3) UL (ref 4–11)

## 2022-02-18 PROCEDURE — 36415 COLL VENOUS BLD VENIPUNCTURE: CPT | Performed by: FAMILY MEDICINE

## 2022-02-18 PROCEDURE — 85025 COMPLETE CBC W/AUTO DIFF WBC: CPT | Performed by: FAMILY MEDICINE

## 2022-02-18 PROCEDURE — 3079F DIAST BP 80-89 MM HG: CPT | Performed by: FAMILY MEDICINE

## 2022-02-18 PROCEDURE — 80053 COMPREHEN METABOLIC PANEL: CPT | Performed by: FAMILY MEDICINE

## 2022-02-18 PROCEDURE — 83690 ASSAY OF LIPASE: CPT | Performed by: FAMILY MEDICINE

## 2022-02-18 PROCEDURE — 99214 OFFICE O/P EST MOD 30 MIN: CPT | Performed by: FAMILY MEDICINE

## 2022-02-18 PROCEDURE — 3008F BODY MASS INDEX DOCD: CPT | Performed by: FAMILY MEDICINE

## 2022-02-18 PROCEDURE — 82150 ASSAY OF AMYLASE: CPT | Performed by: FAMILY MEDICINE

## 2022-02-18 PROCEDURE — 84443 ASSAY THYROID STIM HORMONE: CPT | Performed by: FAMILY MEDICINE

## 2022-02-18 PROCEDURE — 3074F SYST BP LT 130 MM HG: CPT | Performed by: FAMILY MEDICINE

## 2022-02-22 ENCOUNTER — TELEPHONE (OUTPATIENT)
Dept: HEMATOLOGY/ONCOLOGY | Facility: HOSPITAL | Age: 62
End: 2022-02-22

## 2022-02-22 NOTE — TELEPHONE ENCOUNTER
Called Daphne that Russell Cary will be calling her to set up infusion for Thursday and Friday. She verbalizes understanding.

## 2022-02-22 NOTE — TELEPHONE ENCOUNTER
Daphne called she is having flair up of SS pain in legs and requesting hydration Thursday and Friday. Denies fevers, no sob or chest pain, denies n/v/d, she is eating and drinking, denies light headedness, no urinary or bowel issues. Using Norco 2 tabs daily and alternating with Tylenol on most days using more when pain increased. No other complaints per patient.

## 2022-02-24 ENCOUNTER — OFFICE VISIT (OUTPATIENT)
Dept: HEMATOLOGY/ONCOLOGY | Facility: HOSPITAL | Age: 62
End: 2022-02-24
Attending: INTERNAL MEDICINE
Payer: COMMERCIAL

## 2022-02-24 VITALS
SYSTOLIC BLOOD PRESSURE: 133 MMHG | OXYGEN SATURATION: 94 % | TEMPERATURE: 98 F | RESPIRATION RATE: 18 BRPM | DIASTOLIC BLOOD PRESSURE: 70 MMHG | HEART RATE: 67 BPM

## 2022-02-24 DIAGNOSIS — D57.00 SICKLE CELL ANEMIA WITH CRISIS (HCC): Primary | ICD-10-CM

## 2022-02-24 PROCEDURE — 96360 HYDRATION IV INFUSION INIT: CPT

## 2022-02-24 NOTE — PROGRESS NOTES
Pt here for hydration for SS flair. Reports back, and bilateral leg pain, will receive fluids today and tomorrow. Discharged home ambulating independently.

## 2022-02-25 ENCOUNTER — OFFICE VISIT (OUTPATIENT)
Dept: HEMATOLOGY/ONCOLOGY | Facility: HOSPITAL | Age: 62
End: 2022-02-25
Attending: INTERNAL MEDICINE
Payer: COMMERCIAL

## 2022-02-25 VITALS
TEMPERATURE: 98 F | HEART RATE: 66 BPM | DIASTOLIC BLOOD PRESSURE: 70 MMHG | OXYGEN SATURATION: 96 % | RESPIRATION RATE: 16 BRPM | SYSTOLIC BLOOD PRESSURE: 144 MMHG

## 2022-02-25 DIAGNOSIS — D57.00 SICKLE CELL ANEMIA WITH CRISIS (HCC): Primary | ICD-10-CM

## 2022-02-25 PROCEDURE — 96360 HYDRATION IV INFUSION INIT: CPT

## 2022-03-01 ENCOUNTER — ANTI-COAG VISIT (OUTPATIENT)
Dept: INTERNAL MEDICINE CLINIC | Facility: CLINIC | Age: 62
End: 2022-03-01
Payer: COMMERCIAL

## 2022-03-01 DIAGNOSIS — D57.20 SICKLE CELL DISEASE, TYPE SC, WITHOUT CRISIS (HCC): ICD-10-CM

## 2022-03-01 DIAGNOSIS — Z51.81 ENCOUNTER FOR THERAPEUTIC DRUG MONITORING: ICD-10-CM

## 2022-03-01 DIAGNOSIS — Z79.01 LONG TERM (CURRENT) USE OF ANTICOAGULANTS: ICD-10-CM

## 2022-03-01 DIAGNOSIS — D57.00 HB-SS DISEASE WITH CRISIS (HCC): ICD-10-CM

## 2022-03-01 LAB
INR: 2.6 (ref 0.8–1.2)
TEST STRIP EXPIRATION DATE: ABNORMAL DATE

## 2022-03-01 PROCEDURE — 93793 ANTICOAG MGMT PT WARFARIN: CPT

## 2022-03-01 PROCEDURE — 85610 PROTHROMBIN TIME: CPT

## 2022-03-02 ENCOUNTER — HOSPITAL ENCOUNTER (OUTPATIENT)
Dept: CT IMAGING | Facility: HOSPITAL | Age: 62
Discharge: HOME | End: 2022-03-02
Attending: FAMILY MEDICINE
Payer: COMMERCIAL

## 2022-03-02 DIAGNOSIS — R10.9 ABDOMINAL PAIN, UNSPECIFIED ABDOMINAL LOCATION: ICD-10-CM

## 2022-03-02 PROCEDURE — 74177 CT ABD & PELVIS W/CONTRAST: CPT | Performed by: FAMILY MEDICINE

## 2022-03-06 RX ORDER — AMLODIPINE BESYLATE AND BENAZEPRIL HYDROCHLORIDE 10; 20 MG/1; MG/1
1 CAPSULE ORAL DAILY
Qty: 90 CAPSULE | Refills: 1 | Status: SHIPPED | OUTPATIENT
Start: 2022-03-06 | End: 2022-06-10

## 2022-03-06 NOTE — TELEPHONE ENCOUNTER
Refill passed per [facility] protocol. Requested Prescriptions   Pending Prescriptions Disp Refills    AMLODIPINE BESY-BENAZEPRIL HCL 10-20 MG Oral Cap [Pharmacy Med Name: AMLODIPINE-BENAZEPRIL 10-20 MG] 30 capsule 0     Sig: TAKE 1 CAPSULE BY MOUTH EVERY DAY        Hypertensive Medications Protocol Passed - 3/6/2022  9:33 AM        Passed - CMP or BMP in past 12 months        Passed - Appointment in past 6 or next 3 months        Passed - GFR  > 50     Lab Results   Component Value Date    GFRAA 81 02/18/2022                     Recent Outpatient Visits              1 week ago Sickle cell anemia with crisis ([facility] [address]    [address] Visit    1 week ago Sickle cell anemia with crisis ([facility] [address]    [address] Visit    2 weeks ago Abdominal pain, unspecified abdominal location    [facility], [address] MD    Office Visit    1 month ago Sickle cell disease, type sc, without crisis [facility])    [facility] Hematology Oncology    Nurse Only    1 month ago Sickle cell disease, type sc, without crisis [facility])    [facility] Hematology Oncology Chacorta Dickson MD    Office Visit          Future Appointments         Provider Department Appt Notes    In 1 month [address], [place]     In 1 month Ela Logan MD [facility], [employer], [place] Abdominal pain, unspecified abdominal location    In 4 months C[address]Vir[address] Hematology Oncology LABS: CBC,retic    In 4 months Enrique Silvestre 19 Hematology Oncology FOLLOW UP VISIT. CL  6M

## 2022-03-17 ENCOUNTER — TELEPHONE (OUTPATIENT)
Dept: FAMILY MEDICINE CLINIC | Facility: CLINIC | Age: 62
End: 2022-03-17

## 2022-03-17 NOTE — TELEPHONE ENCOUNTER
----- Message from Ryan Miranda MD sent at 3/13/2022  4:52 PM CDT -----  Please schedule office visit for f/u

## 2022-03-21 DIAGNOSIS — G89.29 OTHER CHRONIC PAIN: ICD-10-CM

## 2022-03-21 DIAGNOSIS — D57.00 SICKLE CELL ANEMIA WITH CRISIS (HCC): ICD-10-CM

## 2022-03-21 DIAGNOSIS — Z79.891 LONG TERM (CURRENT) USE OF OPIATE ANALGESIC: ICD-10-CM

## 2022-03-21 RX ORDER — HYDROCODONE BITARTRATE AND ACETAMINOPHEN 10; 325 MG/1; MG/1
TABLET ORAL
Qty: 90 TABLET | Refills: 0 | Status: SHIPPED | OUTPATIENT
Start: 2022-03-21 | End: 2022-03-26

## 2022-03-22 ENCOUNTER — TELEPHONE (OUTPATIENT)
Dept: HEMATOLOGY/ONCOLOGY | Facility: HOSPITAL | Age: 62
End: 2022-03-22

## 2022-03-22 NOTE — TELEPHONE ENCOUNTER
Called [facility] pharmacy and pt is willing to wait for back order to come in. She declined a substitute drug at this time.

## 2022-03-23 ENCOUNTER — TELEPHONE (OUTPATIENT)
Dept: FAMILY MEDICINE CLINIC | Facility: CLINIC | Age: 62
End: 2022-03-23

## 2022-03-23 ENCOUNTER — OFFICE VISIT (OUTPATIENT)
Dept: FAMILY MEDICINE CLINIC | Facility: CLINIC | Age: 62
End: 2022-03-23
Payer: COMMERCIAL

## 2022-03-23 VITALS
HEART RATE: 76 BPM | HEIGHT: 62 IN | BODY MASS INDEX: 37.73 KG/M2 | DIASTOLIC BLOOD PRESSURE: 77 MMHG | WEIGHT: 205 LBS | SYSTOLIC BLOOD PRESSURE: 125 MMHG

## 2022-03-23 DIAGNOSIS — R93.89 ABNORMAL RADIOGRAPH: Primary | ICD-10-CM

## 2022-03-23 DIAGNOSIS — Z12.39 ENCOUNTER FOR SCREENING FOR MALIGNANT NEOPLASM OF BREAST, UNSPECIFIED SCREENING MODALITY: ICD-10-CM

## 2022-03-23 DIAGNOSIS — K42.9 UMBILICAL HERNIA WITHOUT OBSTRUCTION AND WITHOUT GANGRENE: ICD-10-CM

## 2022-03-23 DIAGNOSIS — Z12.11 ENCOUNTER FOR SCREENING COLONOSCOPY: ICD-10-CM

## 2022-03-23 PROCEDURE — 3074F SYST BP LT 130 MM HG: CPT | Performed by: FAMILY MEDICINE

## 2022-03-23 PROCEDURE — 99213 OFFICE O/P EST LOW 20 MIN: CPT | Performed by: FAMILY MEDICINE

## 2022-03-23 PROCEDURE — 3078F DIAST BP <80 MM HG: CPT | Performed by: FAMILY MEDICINE

## 2022-03-23 PROCEDURE — 3008F BODY MASS INDEX DOCD: CPT | Performed by: FAMILY MEDICINE

## 2022-03-26 RX ORDER — HYDROCODONE BITARTRATE AND ACETAMINOPHEN 10; 325 MG/1; MG/1
TABLET ORAL
Qty: 90 TABLET | Refills: 0 | Status: SHIPPED | OUTPATIENT
Start: 2022-03-26

## 2022-03-26 NOTE — PROGRESS NOTES
[facility] Cancelled script and sent to nearby [place].      Mio Wood, [address]

## 2022-04-07 NOTE — TELEPHONE ENCOUNTER
Dr. Amy Padron,     Please sign off on form: fmla recert for spouse  -Highlight the patient and hit \"Chart\" button. -In Chart Review, w/in the Encounter tab - click 1 time on the Telephone call encounter for 3/23/22 Scroll down the telephone encounter.  -Click \"scan on\" blue Hyperlink under \"Media\" heading for Fmla Spouse Dr Amy Padron 4/7/22 w/in the telephone enc.  -Click on Acknowledge button at the bottom right corner and left-click onto image, signature stamp appears and drag signature to Provider signature line. Stamp will turn blue. Close window. Thank you,    Ulisses Ambriz.

## 2022-04-12 ENCOUNTER — ANTI-COAG VISIT (OUTPATIENT)
Dept: INTERNAL MEDICINE CLINIC | Facility: CLINIC | Age: 62
End: 2022-04-12
Payer: COMMERCIAL

## 2022-04-12 DIAGNOSIS — D57.00 HB-SS DISEASE WITH CRISIS (HCC): ICD-10-CM

## 2022-04-12 DIAGNOSIS — Z79.01 LONG TERM (CURRENT) USE OF ANTICOAGULANTS: ICD-10-CM

## 2022-04-12 DIAGNOSIS — Z51.81 ENCOUNTER FOR THERAPEUTIC DRUG MONITORING: ICD-10-CM

## 2022-04-12 DIAGNOSIS — D57.20 SICKLE CELL DISEASE, TYPE SC, WITHOUT CRISIS (HCC): ICD-10-CM

## 2022-04-12 LAB
INR: 3 (ref 0.8–1.2)
TEST STRIP EXPIRATION DATE: ABNORMAL DATE

## 2022-04-12 PROCEDURE — 93793 ANTICOAG MGMT PT WARFARIN: CPT

## 2022-04-12 PROCEDURE — 85610 PROTHROMBIN TIME: CPT

## 2022-04-25 ENCOUNTER — HOSPITAL ENCOUNTER (EMERGENCY)
Facility: HOSPITAL | Age: 62
Discharge: HOME | End: 2022-04-25
Attending: EMERGENCY MEDICINE
Payer: COMMERCIAL

## 2022-04-25 VITALS
HEART RATE: 72 BPM | OXYGEN SATURATION: 92 % | DIASTOLIC BLOOD PRESSURE: 63 MMHG | SYSTOLIC BLOOD PRESSURE: 120 MMHG | RESPIRATION RATE: 18 BRPM | TEMPERATURE: 99 F

## 2022-04-25 DIAGNOSIS — G44.209 TENSION HEADACHE: Primary | ICD-10-CM

## 2022-04-25 LAB
ANION GAP SERPL CALC-SCNC: 5 MMOL/L (ref 0–18)
BASOPHILS # BLD AUTO: 0.05 X10(3) UL (ref 0–0.2)
BASOPHILS NFR BLD AUTO: 0.4 %
BUN BLD-MCNC: 10 MG/DL (ref 7–18)
BUN/CREAT SERPL: 10.4 (ref 10–20)
CALCIUM BLD-MCNC: 9.3 MG/DL (ref 8.5–10.1)
CHLORIDE SERPL-SCNC: 110 MMOL/L (ref 98–112)
CO2 SERPL-SCNC: 26 MMOL/L (ref 21–32)
CREAT BLD-MCNC: 0.96 MG/DL
DEPRECATED RDW RBC AUTO: 50.2 FL (ref 35.1–46.3)
EOSINOPHIL # BLD AUTO: 0.11 X10(3) UL (ref 0–0.7)
EOSINOPHIL NFR BLD AUTO: 0.9 %
ERYTHROCYTE [DISTWIDTH] IN BLOOD BY AUTOMATED COUNT: 16.3 % (ref 11–15)
GLUCOSE BLD-MCNC: 104 MG/DL (ref 70–99)
HCT VFR BLD AUTO: 29.7 %
HGB BLD-MCNC: 10.7 G/DL
IMM GRANULOCYTES # BLD AUTO: 0.04 X10(3) UL (ref 0–1)
IMM GRANULOCYTES NFR BLD: 0.3 %
LYMPHOCYTES # BLD AUTO: 1.75 X10(3) UL (ref 1–4)
LYMPHOCYTES NFR BLD AUTO: 15 %
MCH RBC QN AUTO: 30.7 PG (ref 26–34)
MCHC RBC AUTO-ENTMCNC: 36 G/DL (ref 31–37)
MCV RBC AUTO: 85.3 FL
MONOCYTES # BLD AUTO: 1.73 X10(3) UL (ref 0.1–1)
MONOCYTES NFR BLD AUTO: 14.9 %
NEUTROPHILS # BLD AUTO: 7.96 X10 (3) UL (ref 1.5–7.7)
NEUTROPHILS # BLD AUTO: 7.96 X10(3) UL (ref 1.5–7.7)
NEUTROPHILS NFR BLD AUTO: 68.5 %
OSMOLALITY SERPL CALC.SUM OF ELEC: 291 MOSM/KG (ref 275–295)
PLATELET # BLD AUTO: 364 10(3)UL (ref 150–450)
POTASSIUM SERPL-SCNC: 3.8 MMOL/L (ref 3.5–5.1)
RBC # BLD AUTO: 3.48 X10(6)UL
SODIUM SERPL-SCNC: 141 MMOL/L (ref 136–145)
WBC # BLD AUTO: 11.6 X10(3) UL (ref 4–11)

## 2022-04-25 PROCEDURE — 96374 THER/PROPH/DIAG INJ IV PUSH: CPT

## 2022-04-25 PROCEDURE — 85025 COMPLETE CBC W/AUTO DIFF WBC: CPT | Performed by: EMERGENCY MEDICINE

## 2022-04-25 PROCEDURE — 96361 HYDRATE IV INFUSION ADD-ON: CPT

## 2022-04-25 PROCEDURE — 80048 BASIC METABOLIC PNL TOTAL CA: CPT | Performed by: EMERGENCY MEDICINE

## 2022-04-25 PROCEDURE — 99284 EMERGENCY DEPT VISIT MOD MDM: CPT

## 2022-04-25 RX ORDER — KETOROLAC TROMETHAMINE 15 MG/ML
15 INJECTION, SOLUTION INTRAMUSCULAR; INTRAVENOUS ONCE
Status: COMPLETED | OUTPATIENT
Start: 2022-04-25 | End: 2022-04-25

## 2022-04-25 RX ORDER — KETOROLAC TROMETHAMINE 10 MG/1
10 TABLET, FILM COATED ORAL EVERY 6 HOURS PRN
Qty: 12 TABLET | Refills: 0 | Status: SHIPPED | OUTPATIENT
Start: 2022-04-25 | End: 2022-05-02

## 2022-04-26 ENCOUNTER — OFFICE VISIT (OUTPATIENT)
Dept: GASTROENTEROLOGY | Facility: CLINIC | Age: 62
End: 2022-04-26
Payer: COMMERCIAL

## 2022-04-26 ENCOUNTER — TELEPHONE (OUTPATIENT)
Dept: GASTROENTEROLOGY | Facility: CLINIC | Age: 62
End: 2022-04-26

## 2022-04-26 VITALS
SYSTOLIC BLOOD PRESSURE: 110 MMHG | HEIGHT: 62 IN | DIASTOLIC BLOOD PRESSURE: 66 MMHG | WEIGHT: 202.5 LBS | BODY MASS INDEX: 37.26 KG/M2 | HEART RATE: 84 BPM

## 2022-04-26 DIAGNOSIS — R10.9 ABDOMINAL PAIN, UNSPECIFIED ABDOMINAL LOCATION: ICD-10-CM

## 2022-04-26 DIAGNOSIS — K59.00 CONSTIPATION, UNSPECIFIED CONSTIPATION TYPE: ICD-10-CM

## 2022-04-26 DIAGNOSIS — Z12.11 COLON CANCER SCREENING: Primary | ICD-10-CM

## 2022-04-26 PROCEDURE — 99243 OFF/OP CNSLTJ NEW/EST LOW 30: CPT | Performed by: INTERNAL MEDICINE

## 2022-04-26 PROCEDURE — 3008F BODY MASS INDEX DOCD: CPT | Performed by: INTERNAL MEDICINE

## 2022-04-26 PROCEDURE — 3078F DIAST BP <80 MM HG: CPT | Performed by: INTERNAL MEDICINE

## 2022-04-26 PROCEDURE — 3074F SYST BP LT 130 MM HG: CPT | Performed by: INTERNAL MEDICINE

## 2022-04-26 NOTE — PATIENT INSTRUCTIONS
Abdominal pain  Abdominal hernia/mesh   - see surgeon about this issue - general surgery department [phone]  - colonoscopy and EGD with Golytely and MAC sedation, will need to hold the Warfarin for 5 days before- please get ok from Dr. Luis Bolaños     Constipation  Colon cancer screening  - start on fiber supplement - Benefiber or similar and continue on high fiber diet  - consider Miralax

## 2022-04-26 NOTE — TELEPHONE ENCOUNTER
VickieRn's  Patient will be having Procedure on 10/20/22 at [facility] for Colon /Egd  Please Call PCP or Endocrine for the following recommendation for Medications adjustment of Diabetic meds or insulin   Or Anti-coagulants or anti-platelet  /Warfarin to contact Lynette Hanna office for adjustment   Prior to Procedure

## 2022-04-26 NOTE — TELEPHONE ENCOUNTER
Scheduled for:  Colonoscopy [phone] ,[address]   Provider Name:    Date:  10/20/2022  Location:  [facility]   Sedation:  Mac  Time: 0730 Am (pt is aware to arrive at 0630 Am )   Prep:Split dose Golytely /Egd   Prep instructions were given to pt in the office, pt verbalized understanding. Meds/Allergies Reconciled?:  Physician reviewed     Diagnosis with codes:  Colon cancer screening Z12.11 , abdominal pain R10.9 , Constipation K59.00   Was patient informed to call insurance with codes (Y/N):  Yes, I confirmed [employer] [place]  insurance with the patient. The patient also verbally understands to call her  insurance to check for pre-cert, codes were given on prep instructions. Referral sent?:  Yes , Referral was sent at the time of electronic surgical scheduling. [facility] or [address] notified?:  I sent an electronic request to Endo Scheduling and received a confirmation today. Medication Orders: This patient verbally confirmed that she is not taking:   No Iron, blood thinners - warfarin/ need to hold the Warfarin for 5 days before- please get ok from Dr. Colette Davidson ,  BP meds- Amlodipine -Benazepril  and is not diabetic   Not latex allergy, Not PCN allergy and does not have a pacemaker Pt is aware to NOT take iron pills, herbal meds and diet supplements for 7 days before exam. Also to NOT take any form of alcohol, recreational drugs and any forms of ED meds 24 hours before exam.    Misc Orders:  Patient was informed that they will need a COVID 19 test prior to their procedure. Patient verbally understood & will await a phone call from [facility] to schedule.       Further instructions given by staff:

## 2022-04-27 NOTE — TELEPHONE ENCOUNTER
Dr. Luis Bolaños    Patient is scheduled for colonoscopy and EGD with Dr. Lexi Arceo on 10/20/2022. Gi defers to prescribing physician to advise IF patient may hold warfarin. If so, please advise on how many days the patient may hold.     Thank you    Em Gi Clinical Staff

## 2022-05-02 ENCOUNTER — TELEPHONE (OUTPATIENT)
Dept: GASTROENTEROLOGY | Facility: CLINIC | Age: 62
End: 2022-05-02

## 2022-05-02 NOTE — TELEPHONE ENCOUNTER
Pt states she has a procedure scheduled but has questions on time and date and specifics because she needs to call another Dr. Please call

## 2022-05-02 NOTE — TELEPHONE ENCOUNTER
Patient contacted. Reviewed date and time of procedure. She told me that she missed a call and wanted to know if it was our office. I let her know it looks like the nurse at her PCP office was trying to call her about the blood thinner adjustment prior to this procedure. I told her to please call PCP to review since NELY con't instruct on Lovenox bridging.

## 2022-05-02 NOTE — TELEPHONE ENCOUNTER
Please do not use routing comments as they are not part of the permanent chart. When that is used to message nursing, we have to copy and paste in another note in order to make it a permanent message in the chart. See note below,thanks. Copied and pasted:        Please call the patient and tell her the instructions   So hold coumadin 3 days prior to procedure   Start lovenox at then and hold it the night prior to colonoscopy   I am going to fax Rx but she needs to understand not to take it imediatelly     Message text      Called patient and left detailed message to cell (ok per TODD media tab),  regarding message text above. Advised to call back with any questions and that I will place the instructions on her my chart as well.      Also there is nothing faxed as far as any medication to the patient's pharmacy

## 2022-05-06 ENCOUNTER — TELEPHONE (OUTPATIENT)
Dept: HEMATOLOGY/ONCOLOGY | Facility: HOSPITAL | Age: 62
End: 2022-05-06

## 2022-05-06 RX ORDER — HYDROCODONE BITARTRATE AND ACETAMINOPHEN 10; 325 MG/1; MG/1
TABLET ORAL
Qty: 90 TABLET | Refills: 0 | Status: SHIPPED | OUTPATIENT
Start: 2022-05-06

## 2022-05-10 ENCOUNTER — TELEPHONE (OUTPATIENT)
Dept: FAMILY MEDICINE CLINIC | Facility: CLINIC | Age: 62
End: 2022-05-10

## 2022-05-10 NOTE — TELEPHONE ENCOUNTER
80 mg written for, however directions indicate 90mg. Please clarify. Procedure is not until 10/20/22, routed to provider to review upon her return 5/13.

## 2022-05-18 ENCOUNTER — ANTI-COAG VISIT (OUTPATIENT)
Dept: ANTICOAGULATION | Facility: CLINIC | Age: 62
End: 2022-05-18

## 2022-05-18 ENCOUNTER — OFFICE VISIT (OUTPATIENT)
Dept: HEMATOLOGY/ONCOLOGY | Facility: HOSPITAL | Age: 62
End: 2022-05-18
Attending: INTERNAL MEDICINE
Payer: COMMERCIAL

## 2022-05-18 ENCOUNTER — TELEPHONE (OUTPATIENT)
Dept: ANTICOAGULATION | Facility: CLINIC | Age: 62
End: 2022-05-18

## 2022-05-18 ENCOUNTER — TELEPHONE (OUTPATIENT)
Dept: HEMATOLOGY/ONCOLOGY | Facility: HOSPITAL | Age: 62
End: 2022-05-18

## 2022-05-18 VITALS
OXYGEN SATURATION: 94 % | SYSTOLIC BLOOD PRESSURE: 120 MMHG | HEART RATE: 75 BPM | DIASTOLIC BLOOD PRESSURE: 68 MMHG | TEMPERATURE: 98 F | RESPIRATION RATE: 16 BRPM

## 2022-05-18 VITALS
RESPIRATION RATE: 16 BRPM | OXYGEN SATURATION: 94 % | HEART RATE: 75 BPM | DIASTOLIC BLOOD PRESSURE: 68 MMHG | TEMPERATURE: 98 F | SYSTOLIC BLOOD PRESSURE: 120 MMHG

## 2022-05-18 DIAGNOSIS — D57.00 HB-SS DISEASE WITH CRISIS (HCC): ICD-10-CM

## 2022-05-18 DIAGNOSIS — Z51.81 ENCOUNTER FOR MEDICATION MONITORING: ICD-10-CM

## 2022-05-18 DIAGNOSIS — D57.00 SICKLE CELL ANEMIA WITH CRISIS (HCC): Primary | ICD-10-CM

## 2022-05-18 DIAGNOSIS — D57.20 SICKLE CELL DISEASE, TYPE SC, WITHOUT CRISIS (HCC): ICD-10-CM

## 2022-05-18 DIAGNOSIS — Z79.01 LONG TERM (CURRENT) USE OF ANTICOAGULANTS: ICD-10-CM

## 2022-05-18 DIAGNOSIS — Z51.81 ENCOUNTER FOR THERAPEUTIC DRUG MONITORING: ICD-10-CM

## 2022-05-18 DIAGNOSIS — D57.20 SICKLE CELL DISEASE, TYPE SC, WITHOUT CRISIS (HCC): Primary | ICD-10-CM

## 2022-05-18 LAB
BASOPHILS # BLD AUTO: 0.04 X10(3) UL (ref 0–0.2)
BASOPHILS NFR BLD AUTO: 0.5 %
DEPRECATED RDW RBC AUTO: 50 FL (ref 35.1–46.3)
EOSINOPHIL # BLD AUTO: 0.19 X10(3) UL (ref 0–0.7)
EOSINOPHIL NFR BLD AUTO: 2.3 %
ERYTHROCYTE [DISTWIDTH] IN BLOOD BY AUTOMATED COUNT: 15.7 % (ref 11–15)
HCT VFR BLD AUTO: 32.5 %
HGB BLD-MCNC: 11.6 G/DL
HGB RETIC QN AUTO: 33.6 PG (ref 28.2–36.6)
IMM GRANULOCYTES # BLD AUTO: 0.03 X10(3) UL (ref 0–1)
IMM GRANULOCYTES NFR BLD: 0.4 %
IMM RETICS NFR: 0.22 RATIO (ref 0.1–0.3)
INR BLD: 2.87 (ref 0.8–1.2)
LYMPHOCYTES # BLD AUTO: 2.78 X10(3) UL (ref 1–4)
LYMPHOCYTES NFR BLD AUTO: 33.9 %
MCH RBC QN AUTO: 30.9 PG (ref 26–34)
MCHC RBC AUTO-ENTMCNC: 35.7 G/DL (ref 31–37)
MCV RBC AUTO: 86.7 FL
MONOCYTES # BLD AUTO: 0.95 X10(3) UL (ref 0.1–1)
MONOCYTES NFR BLD AUTO: 11.6 %
NEUTROPHILS # BLD AUTO: 4.22 X10 (3) UL (ref 1.5–7.7)
NEUTROPHILS # BLD AUTO: 4.22 X10(3) UL (ref 1.5–7.7)
NEUTROPHILS NFR BLD AUTO: 51.3 %
PLATELET # BLD AUTO: 333 10(3)UL (ref 150–450)
PLATELET MORPHOLOGY: NORMAL
PROTHROMBIN TIME: 30.5 SECONDS (ref 11.6–14.8)
RBC # BLD AUTO: 3.75 X10(6)UL
RETICS # AUTO: 137.3 X10(3) UL (ref 22.5–147.5)
RETICS/RBC NFR AUTO: 3.7 %
WBC # BLD AUTO: 8.2 X10(3) UL (ref 4–11)

## 2022-05-18 PROCEDURE — 96360 HYDRATION IV INFUSION INIT: CPT

## 2022-05-18 PROCEDURE — 96361 HYDRATE IV INFUSION ADD-ON: CPT

## 2022-05-18 PROCEDURE — 85045 AUTOMATED RETICULOCYTE COUNT: CPT

## 2022-05-18 PROCEDURE — 85025 COMPLETE CBC W/AUTO DIFF WBC: CPT

## 2022-05-18 PROCEDURE — 99214 OFFICE O/P EST MOD 30 MIN: CPT | Performed by: NURSE PRACTITIONER

## 2022-05-18 PROCEDURE — 85610 PROTHROMBIN TIME: CPT

## 2022-05-18 NOTE — PROGRESS NOTES
Pt called this morning requesting hydration to help manage sickle cell pain. PRN orders for fluids in. Upon assessment, pt reports ongoing HA x1 month with 7-8/10 pain level. Pt states she was seen in ED last month for same HA and was discharged with diagnosed tension HA. Pt now reports that COHEN is gradually getting worse and over the last few days she has been experiencing gait unsteadiness and describes pulling to her right side. Denies any blurry or vision loss. Has occasional dizziness. LEVI Ramirez notified and will provide ACV at chairside. Orders received to draw INR, CBC and retic count. Okay to give second liter of 0.9 NS if pt requests. Pt requested and tolerated 2 L 0.9 NS over 2 hours in total. Pt stated she was feeling much better after hydration; okay to discharge to home per APN.

## 2022-05-18 NOTE — TELEPHONE ENCOUNTER
Anticoag referral expires soon. Order pended. Please sign, thank you.         Dx: Sickle cell disease, type sc, without crisis ([facility] [address] (D57.20)  Hb-SS disease with crisis ([facility] [address] (D57.00)  Long term (current) use of anticoagulants (Z79.01)

## 2022-05-22 PROBLEM — Z51.81 ENCOUNTER FOR THERAPEUTIC DRUG MONITORING: Status: ACTIVE | Noted: 2022-05-22

## 2022-05-23 RX ORDER — ALBUTEROL SULFATE 90 UG/1
2 AEROSOL, METERED RESPIRATORY (INHALATION) EVERY 4 HOURS PRN
Qty: 25.5 EACH | Refills: 1 | Status: SHIPPED | OUTPATIENT
Start: 2022-05-23 | End: 2022-06-10

## 2022-05-23 NOTE — TELEPHONE ENCOUNTER
Refill passed per [facility] protocol. Requested Prescriptions   Pending Prescriptions Disp Refills    ALBUTEROL 108 (90 Base) MCG/ACT Inhalation Aero Soln [Pharmacy Med Name: ALBUTEROL HFA (PROAIR) INHALER] 25.5 each 1     Sig: INHALE 2 PUFFS BY MOUTH EVERY 4 HOURS AS NEEDED        Asthma & COPD Medication Protocol Passed - 5/23/2022 12:00 AM        Passed - Appointment in past 6 or next 3 months              Recent Outpatient Visits              5 days ago Sickle cell disease, type sc, without crisis [facility])    [facility] Hematology Oncology CHRISTO Gibson    Office Visit    5 days ago Sickle cell anemia with crisis ([facility] [address]    [address] Visit    3 weeks ago Colon cancer screening    [facility], [address], Tyrone Mcdowell MD    Office Visit    2 months ago Abnormal radiograph    Bairon Calle MD    Office Visit    2 months ago Sickle cell anemia with crisis [facility])    [address] Visit            Future Appointments         Provider Department Appt Notes    In 1 month Kaur Copeland RN [employer], [place], [address] [facility] Nic Falcon     In 1 month Stacey 25 Hematology Oncology LABS: [facility]    In 1 month Enrique Davdison 19 Hematology Oncology FOLLOW UP VISIT. CL  6M    In 5 months YO, PROCEDURE ECWMO GI PROCEDURE Colon screening and Egd w/Mac @[facility]

## 2022-05-23 NOTE — TELEPHONE ENCOUNTER
Spoke to Giovani Mai at [facility] and clarified directions should say to use 80 mg instead of 90 mg

## 2022-06-10 RX ORDER — AMLODIPINE BESYLATE AND BENAZEPRIL HYDROCHLORIDE 10; 20 MG/1; MG/1
1 CAPSULE ORAL DAILY
Qty: 90 CAPSULE | Refills: 1 | Status: SHIPPED | OUTPATIENT
Start: 2022-06-10 | End: 2022-06-29

## 2022-06-10 RX ORDER — FLUTICASONE PROPIONATE 50 MCG
2 SPRAY, SUSPENSION (ML) NASAL DAILY
Qty: 48 ML | Refills: 1 | Status: SHIPPED | OUTPATIENT
Start: 2022-06-10 | End: 2023-04-04

## 2022-06-10 RX ORDER — OXYBUTYNIN CHLORIDE 15 MG/1
15 TABLET, EXTENDED RELEASE ORAL DAILY
Qty: 90 TABLET | Refills: 1 | Status: SHIPPED | OUTPATIENT
Start: 2022-06-10 | End: 2022-06-16

## 2022-06-10 RX ORDER — ALBUTEROL SULFATE 90 UG/1
2 AEROSOL, METERED RESPIRATORY (INHALATION) EVERY 4 HOURS PRN
Qty: 25.5 EACH | Refills: 1 | Status: SHIPPED | OUTPATIENT
Start: 2022-06-10 | End: 2022-12-19

## 2022-06-10 NOTE — TELEPHONE ENCOUNTER
Patient requesting a return call from a nurse to discuss multiple medications that will need to be refilled. Patient did not disclose all medications needed.

## 2022-06-10 NOTE — TELEPHONE ENCOUNTER
Refill passed per [place] [employer] protocol. Requested Prescriptions   Pending Prescriptions Disp Refills    albuterol 108 (90 Base) MCG/ACT Inhalation Aero Soln 25.5 each 1     Sig: Inhale 2 puffs into the lungs every 4 (four) hours as needed. Asthma & COPD Medication Protocol Passed - 6/10/2022 12:43 PM        Passed - Appointment in past 6 or next 3 months           amLODIPine Besy-Benazepril HCl 10-20 MG Oral Cap 90 capsule 1     Sig: Take 1 capsule by mouth daily. Hypertensive Medications Protocol Passed - 6/10/2022 12:43 PM        Passed - CMP or BMP in past 12 months        Passed - Appointment in past 6 or next 3 months        Passed - GFR  > 50     Lab Results   Component Value Date    GFRAA 74 2022                    fluticasone propionate 50 MCG/ACT Nasal Suspension 48 mL 1     Si sprays by Nasal route daily. Allergy Medication Protocol Passed - 6/10/2022 12:43 PM        Passed - Appointment in past 12 or next 3 months           Oxybutynin Chloride ER 15 MG Oral Tablet 24 Hr 90 tablet 2     Sig: Take 1 tablet (15 mg total) by mouth daily.         Genitourinary Medications Passed - 6/10/2022 12:43 PM        Passed - Patient does not have pulmonary hypertension on problem list        Passed - Appointment in the past 12 or next 3 months              Recent Outpatient Visits              3 weeks ago Sickle cell disease, type sc, without crisis [facility])    [facility] Hematology Oncology Phipps Link, APRN    Office Visit    3 weeks ago Sickle cell anemia with crisis ([facility] [address]    [address] Visit    1 month ago Colon cancer screening    [facility] Shanelle Luna MD    Office Visit    2 months ago Abnormal radiograph    Edwin Covington MD    Office Visit    3 months ago Sickle cell anemia with crisis [facility])    [address] Office Visit            Future Appointments         Provider Department Appt Notes    In 2 weeks Jennifer Beth, RN [address] Sha[address] Bain     In 1 month CindyReji 25 Hematology Oncology LABS: CBC,retic    In 1 month Enrique Juarez 19 Hematology Oncology FOLLOW UP VISIT. CL  6M    In 4 months SANAZ, PROCEDURE ECWMO GI PROCEDURE Colon screening and Egd w/Mac @[facility]

## 2022-06-10 NOTE — TELEPHONE ENCOUNTER
Spoke with Pt and verified she needs medications sent to [place]. Pt no longer uses [facility] pharmacy. Pt requesting for   Amlodipine-benazepril  Albuterol inhaler  Nasal spray  Oxybutynin  Pt informed folic acid and pain medication were prescribed by Oncology. Pt states she has an appointment with them on Monday, and will ask for medications to be transferred then.

## 2022-06-16 RX ORDER — OXYBUTYNIN CHLORIDE 15 MG/1
TABLET, EXTENDED RELEASE ORAL
Qty: 90 TABLET | Refills: 2 | Status: SHIPPED | OUTPATIENT
Start: 2022-06-16

## 2022-06-20 DIAGNOSIS — Z79.891 LONG TERM (CURRENT) USE OF OPIATE ANALGESIC: ICD-10-CM

## 2022-06-20 DIAGNOSIS — G89.29 OTHER CHRONIC PAIN: ICD-10-CM

## 2022-06-20 DIAGNOSIS — D57.00 SICKLE CELL ANEMIA WITH CRISIS (HCC): ICD-10-CM

## 2022-06-20 RX ORDER — LIDOCAINE 50 MG/G
PATCH TOPICAL
Qty: 60 PATCH | Refills: 2 | Status: SHIPPED | OUTPATIENT
Start: 2022-06-20 | End: 2022-08-30

## 2022-06-20 RX ORDER — HYDROCODONE BITARTRATE AND ACETAMINOPHEN 10; 325 MG/1; MG/1
TABLET ORAL
Qty: 90 TABLET | Refills: 0 | Status: SHIPPED | OUTPATIENT
Start: 2022-06-20 | End: 2022-08-05

## 2022-06-29 ENCOUNTER — ANTI-COAG VISIT (OUTPATIENT)
Dept: ANTICOAGULATION | Facility: CLINIC | Age: 62
End: 2022-06-29
Payer: COMMERCIAL

## 2022-06-29 DIAGNOSIS — D57.00 HB-SS DISEASE WITH CRISIS (HCC): ICD-10-CM

## 2022-06-29 DIAGNOSIS — Z51.81 ENCOUNTER FOR THERAPEUTIC DRUG MONITORING: ICD-10-CM

## 2022-06-29 DIAGNOSIS — D57.20 SICKLE CELL DISEASE, TYPE SC, WITHOUT CRISIS (HCC): ICD-10-CM

## 2022-06-29 DIAGNOSIS — Z79.01 LONG TERM (CURRENT) USE OF ANTICOAGULANTS: ICD-10-CM

## 2022-06-29 LAB
INR: 2 (ref 0.8–1.2)
TEST STRIP EXPIRATION DATE: ABNORMAL DATE

## 2022-06-29 PROCEDURE — 93793 ANTICOAG MGMT PT WARFARIN: CPT | Performed by: FAMILY MEDICINE

## 2022-06-29 PROCEDURE — 85610 PROTHROMBIN TIME: CPT | Performed by: FAMILY MEDICINE

## 2022-06-29 RX ORDER — AMLODIPINE BESYLATE AND BENAZEPRIL HYDROCHLORIDE 10; 20 MG/1; MG/1
1 CAPSULE ORAL DAILY
Qty: 90 CAPSULE | Refills: 1 | Status: SHIPPED | OUTPATIENT
Start: 2022-06-29

## 2022-06-29 NOTE — TELEPHONE ENCOUNTER
Please review pended script and sign if appropriate. Patient using [employer] mail service for this medication.

## 2022-07-12 DIAGNOSIS — D57.20 SICKLE CELL DISEASE, TYPE SC, WITHOUT CRISIS (HCC): Primary | ICD-10-CM

## 2022-07-14 ENCOUNTER — OFFICE VISIT (OUTPATIENT)
Dept: HEMATOLOGY/ONCOLOGY | Facility: HOSPITAL | Age: 62
End: 2022-07-14
Attending: INTERNAL MEDICINE
Payer: COMMERCIAL

## 2022-07-14 VITALS
BODY MASS INDEX: 36.06 KG/M2 | DIASTOLIC BLOOD PRESSURE: 69 MMHG | RESPIRATION RATE: 16 BRPM | SYSTOLIC BLOOD PRESSURE: 133 MMHG | OXYGEN SATURATION: 96 % | WEIGHT: 201 LBS | TEMPERATURE: 98 F | HEIGHT: 62.5 IN | HEART RATE: 64 BPM

## 2022-07-14 DIAGNOSIS — D57.20 SICKLE CELL DISEASE, TYPE SC, WITHOUT CRISIS (HCC): ICD-10-CM

## 2022-07-14 DIAGNOSIS — Z51.81 ENCOUNTER FOR MEDICATION MONITORING: ICD-10-CM

## 2022-07-14 DIAGNOSIS — D57.20 SICKLE CELL DISEASE, TYPE SC, WITHOUT CRISIS (HCC): Primary | ICD-10-CM

## 2022-07-14 LAB
BASOPHILS # BLD AUTO: 0.07 X10(3) UL (ref 0–0.2)
BASOPHILS NFR BLD AUTO: 0.8 %
DEPRECATED RDW RBC AUTO: 49.9 FL (ref 35.1–46.3)
EOSINOPHIL # BLD AUTO: 0.42 X10(3) UL (ref 0–0.7)
EOSINOPHIL NFR BLD AUTO: 4.7 %
ERYTHROCYTE [DISTWIDTH] IN BLOOD BY AUTOMATED COUNT: 15.9 % (ref 11–15)
HCT VFR BLD AUTO: 31.5 %
HGB BLD-MCNC: 11.2 G/DL
HGB RETIC QN AUTO: 34.2 PG (ref 28.2–36.6)
IMM GRANULOCYTES # BLD AUTO: 0.06 X10(3) UL (ref 0–1)
IMM GRANULOCYTES NFR BLD: 0.7 %
IMM RETICS NFR: 0.33 RATIO (ref 0.1–0.3)
LYMPHOCYTES # BLD AUTO: 2.99 X10(3) UL (ref 1–4)
LYMPHOCYTES NFR BLD AUTO: 33.6 %
MCH RBC QN AUTO: 30.9 PG (ref 26–34)
MCHC RBC AUTO-ENTMCNC: 35.6 G/DL (ref 31–37)
MCV RBC AUTO: 87 FL
MONOCYTES # BLD AUTO: 1.24 X10(3) UL (ref 0.1–1)
MONOCYTES NFR BLD AUTO: 13.9 %
NEUTROPHILS # BLD AUTO: 4.11 X10 (3) UL (ref 1.5–7.7)
NEUTROPHILS # BLD AUTO: 4.11 X10(3) UL (ref 1.5–7.7)
NEUTROPHILS NFR BLD AUTO: 46.3 %
PLATELET # BLD AUTO: 337 10(3)UL (ref 150–450)
PLATELET MORPHOLOGY: NORMAL
RBC # BLD AUTO: 3.62 X10(6)UL
RETICS # AUTO: 201.6 X10(3) UL (ref 22.5–147.5)
RETICS/RBC NFR AUTO: 5.6 %
WBC # BLD AUTO: 8.9 X10(3) UL (ref 4–11)

## 2022-07-14 PROCEDURE — 85045 AUTOMATED RETICULOCYTE COUNT: CPT

## 2022-07-14 PROCEDURE — 99214 OFFICE O/P EST MOD 30 MIN: CPT | Performed by: INTERNAL MEDICINE

## 2022-07-14 PROCEDURE — 85025 COMPLETE CBC W/AUTO DIFF WBC: CPT

## 2022-07-14 PROCEDURE — 36415 COLL VENOUS BLD VENIPUNCTURE: CPT

## 2022-07-21 NOTE — TELEPHONE ENCOUNTER
Pt on the phone requesting a refill on the following medication. Pt states she is out of this medication.      folic acid 1 MG Oral Tab

## 2022-07-22 RX ORDER — FOLIC ACID 1 MG/1
1 TABLET ORAL DAILY
Qty: 90 TABLET | Refills: 3 | Status: SHIPPED | OUTPATIENT
Start: 2022-07-22

## 2022-07-27 ENCOUNTER — ANTI-COAG VISIT (OUTPATIENT)
Dept: ANTICOAGULATION | Facility: CLINIC | Age: 62
End: 2022-07-27
Payer: COMMERCIAL

## 2022-07-27 DIAGNOSIS — D57.20 SICKLE CELL DISEASE, TYPE SC, WITHOUT CRISIS (HCC): ICD-10-CM

## 2022-07-27 DIAGNOSIS — D57.00 HB-SS DISEASE WITH CRISIS (HCC): ICD-10-CM

## 2022-07-27 DIAGNOSIS — Z51.81 ENCOUNTER FOR THERAPEUTIC DRUG MONITORING: ICD-10-CM

## 2022-07-27 DIAGNOSIS — Z79.01 LONG TERM CURRENT USE OF ANTICOAGULANT THERAPY: ICD-10-CM

## 2022-07-27 LAB
INR: 2.4 (ref 0.8–1.2)
TEST STRIP EXPIRATION DATE: ABNORMAL DATE

## 2022-07-27 PROCEDURE — 85610 PROTHROMBIN TIME: CPT | Performed by: INTERNAL MEDICINE

## 2022-07-27 PROCEDURE — 93793 ANTICOAG MGMT PT WARFARIN: CPT | Performed by: INTERNAL MEDICINE

## 2022-07-30 ENCOUNTER — HOSPITAL ENCOUNTER (EMERGENCY)
Facility: HOSPITAL | Age: 62
Discharge: HOME | End: 2022-07-30
Attending: EMERGENCY MEDICINE
Payer: COMMERCIAL

## 2022-07-30 VITALS
SYSTOLIC BLOOD PRESSURE: 125 MMHG | DIASTOLIC BLOOD PRESSURE: 72 MMHG | BODY MASS INDEX: 29.44 KG/M2 | TEMPERATURE: 98 F | RESPIRATION RATE: 18 BRPM | HEIGHT: 62 IN | HEART RATE: 64 BPM | OXYGEN SATURATION: 96 % | WEIGHT: 160 LBS

## 2022-07-30 DIAGNOSIS — D57.00 SICKLE CELL PAIN CRISIS (HCC): Primary | ICD-10-CM

## 2022-07-30 LAB
BASOPHILS # BLD AUTO: 0.07 X10(3) UL (ref 0–0.2)
BASOPHILS NFR BLD AUTO: 0.4 %
BILIRUB UR QL: NEGATIVE
CLARITY UR: CLEAR
COLOR UR: YELLOW
DEPRECATED RDW RBC AUTO: 49.4 FL (ref 35.1–46.3)
EOSINOPHIL # BLD AUTO: 0.1 X10(3) UL (ref 0–0.7)
EOSINOPHIL NFR BLD AUTO: 0.5 %
ERYTHROCYTE [DISTWIDTH] IN BLOOD BY AUTOMATED COUNT: 15.7 % (ref 11–15)
GLUCOSE UR-MCNC: NEGATIVE MG/DL
HCT VFR BLD AUTO: 33.8 %
HGB BLD-MCNC: 11.9 G/DL
HGB RETIC QN AUTO: 34.4 PG (ref 28.2–36.6)
HGB UR QL STRIP.AUTO: NEGATIVE
IMM GRANULOCYTES # BLD AUTO: 0.09 X10(3) UL (ref 0–1)
IMM GRANULOCYTES NFR BLD: 0.5 %
IMM RETICS NFR: 0.32 RATIO (ref 0.1–0.3)
KETONES UR-MCNC: NEGATIVE MG/DL
LEUKOCYTE ESTERASE UR QL STRIP.AUTO: NEGATIVE
LYMPHOCYTES # BLD AUTO: 2.87 X10(3) UL (ref 1–4)
LYMPHOCYTES NFR BLD AUTO: 14.9 %
MCH RBC QN AUTO: 31 PG (ref 26–34)
MCHC RBC AUTO-ENTMCNC: 35.2 G/DL (ref 31–37)
MCV RBC AUTO: 88 FL
MONOCYTES # BLD AUTO: 1.42 X10(3) UL (ref 0.1–1)
MONOCYTES NFR BLD AUTO: 7.4 %
NEUTROPHILS # BLD AUTO: 14.76 X10 (3) UL (ref 1.5–7.7)
NEUTROPHILS # BLD AUTO: 14.76 X10(3) UL (ref 1.5–7.7)
NEUTROPHILS NFR BLD AUTO: 76.3 %
NITRITE UR QL STRIP.AUTO: NEGATIVE
PH UR: 7 [PH] (ref 5–8)
PLATELET # BLD AUTO: 360 10(3)UL (ref 150–450)
PROT UR-MCNC: NEGATIVE MG/DL
RBC # BLD AUTO: 3.84 X10(6)UL
RETICS # AUTO: 190.8 X10(3) UL (ref 22.5–147.5)
RETICS/RBC NFR AUTO: 5 %
SP GR UR STRIP: 1.01 (ref 1–1.03)
UROBILINOGEN UR STRIP-ACNC: 1
WBC # BLD AUTO: 19.3 X10(3) UL (ref 4–11)

## 2022-07-30 PROCEDURE — 85045 AUTOMATED RETICULOCYTE COUNT: CPT

## 2022-07-30 PROCEDURE — 85045 AUTOMATED RETICULOCYTE COUNT: CPT | Performed by: EMERGENCY MEDICINE

## 2022-07-30 PROCEDURE — 96361 HYDRATE IV INFUSION ADD-ON: CPT

## 2022-07-30 PROCEDURE — 85025 COMPLETE CBC W/AUTO DIFF WBC: CPT | Performed by: EMERGENCY MEDICINE

## 2022-07-30 PROCEDURE — 81003 URINALYSIS AUTO W/O SCOPE: CPT | Performed by: EMERGENCY MEDICINE

## 2022-07-30 PROCEDURE — 99284 EMERGENCY DEPT VISIT MOD MDM: CPT

## 2022-07-30 PROCEDURE — 81003 URINALYSIS AUTO W/O SCOPE: CPT

## 2022-07-30 PROCEDURE — 85025 COMPLETE CBC W/AUTO DIFF WBC: CPT

## 2022-07-30 PROCEDURE — 96374 THER/PROPH/DIAG INJ IV PUSH: CPT

## 2022-07-30 RX ORDER — MORPHINE SULFATE 2 MG/ML
2 INJECTION, SOLUTION INTRAMUSCULAR; INTRAVENOUS ONCE
Status: COMPLETED | OUTPATIENT
Start: 2022-07-30 | End: 2022-07-30

## 2022-07-30 NOTE — ED INITIAL ASSESSMENT (HPI)
Sickle cell pain x1 week, pain to R lower back. Denies dysuria. fever of 101.4 this AM, pt took norco at 0530.

## 2022-08-02 ENCOUNTER — OFFICE VISIT (OUTPATIENT)
Dept: FAMILY MEDICINE CLINIC | Facility: CLINIC | Age: 62
End: 2022-08-02
Payer: COMMERCIAL

## 2022-08-02 ENCOUNTER — LAB ENCOUNTER (OUTPATIENT)
Dept: LAB | Age: 62
End: 2022-08-02
Attending: FAMILY MEDICINE
Payer: COMMERCIAL

## 2022-08-02 VITALS
WEIGHT: 202 LBS | HEIGHT: 62 IN | SYSTOLIC BLOOD PRESSURE: 111 MMHG | DIASTOLIC BLOOD PRESSURE: 74 MMHG | BODY MASS INDEX: 37.17 KG/M2 | HEART RATE: 84 BPM

## 2022-08-02 DIAGNOSIS — R89.9 ABNORMAL LABORATORY TEST RESULT: ICD-10-CM

## 2022-08-02 DIAGNOSIS — R39.89 URINARY PROBLEM: Primary | ICD-10-CM

## 2022-08-02 DIAGNOSIS — R51.9 NONINTRACTABLE EPISODIC HEADACHE, UNSPECIFIED HEADACHE TYPE: ICD-10-CM

## 2022-08-02 DIAGNOSIS — D57.00 SICKLE CELL DISEASE WITH CRISIS (HCC): ICD-10-CM

## 2022-08-02 DIAGNOSIS — R39.89 URINARY PROBLEM: ICD-10-CM

## 2022-08-02 LAB
BASOPHILS # BLD AUTO: 0.05 X10(3) UL (ref 0–0.2)
BASOPHILS NFR BLD AUTO: 0.5 %
DEPRECATED RDW RBC AUTO: 49.5 FL (ref 35.1–46.3)
EOSINOPHIL # BLD AUTO: 0.48 X10(3) UL (ref 0–0.7)
EOSINOPHIL NFR BLD AUTO: 5.1 %
ERYTHROCYTE [DISTWIDTH] IN BLOOD BY AUTOMATED COUNT: 15.5 % (ref 11–15)
HCT VFR BLD AUTO: 32.3 %
HGB BLD-MCNC: 11.2 G/DL
IMM GRANULOCYTES # BLD AUTO: 0.03 X10(3) UL (ref 0–1)
IMM GRANULOCYTES NFR BLD: 0.3 %
LYMPHOCYTES # BLD AUTO: 2.62 X10(3) UL (ref 1–4)
LYMPHOCYTES NFR BLD AUTO: 27.7 %
MCH RBC QN AUTO: 30.9 PG (ref 26–34)
MCHC RBC AUTO-ENTMCNC: 34.7 G/DL (ref 31–37)
MCV RBC AUTO: 89.2 FL
MONOCYTES # BLD AUTO: 1.06 X10(3) UL (ref 0.1–1)
MONOCYTES NFR BLD AUTO: 11.2 %
NEUTROPHILS # BLD AUTO: 5.21 X10 (3) UL (ref 1.5–7.7)
NEUTROPHILS # BLD AUTO: 5.21 X10(3) UL (ref 1.5–7.7)
NEUTROPHILS NFR BLD AUTO: 55.2 %
PLATELET # BLD AUTO: 386 10(3)UL (ref 150–450)
PLATELET MORPHOLOGY: NORMAL
RBC # BLD AUTO: 3.62 X10(6)UL
WBC # BLD AUTO: 9.5 X10(3) UL (ref 4–11)

## 2022-08-02 PROCEDURE — 36415 COLL VENOUS BLD VENIPUNCTURE: CPT

## 2022-08-02 PROCEDURE — 87086 URINE CULTURE/COLONY COUNT: CPT

## 2022-08-02 PROCEDURE — 3078F DIAST BP <80 MM HG: CPT | Performed by: FAMILY MEDICINE

## 2022-08-02 PROCEDURE — 85025 COMPLETE CBC W/AUTO DIFF WBC: CPT

## 2022-08-02 PROCEDURE — 3074F SYST BP LT 130 MM HG: CPT | Performed by: FAMILY MEDICINE

## 2022-08-02 PROCEDURE — 3008F BODY MASS INDEX DOCD: CPT | Performed by: FAMILY MEDICINE

## 2022-08-02 PROCEDURE — 99214 OFFICE O/P EST MOD 30 MIN: CPT | Performed by: FAMILY MEDICINE

## 2022-08-02 RX ORDER — OXYBUTYNIN CHLORIDE 5 MG/1
TABLET ORAL
Qty: 90 TABLET | Refills: 1 | Status: SHIPPED | OUTPATIENT
Start: 2022-08-02

## 2022-08-04 ENCOUNTER — LAB ENCOUNTER (OUTPATIENT)
Dept: LAB | Facility: HOSPITAL | Age: 62
End: 2022-08-04
Attending: Other
Payer: COMMERCIAL

## 2022-08-04 DIAGNOSIS — G31.84 MCI (MILD COGNITIVE IMPAIRMENT): ICD-10-CM

## 2022-08-04 PROBLEM — E66.01 MORBID (SEVERE) OBESITY DUE TO EXCESS CALORIES (HCC): Status: ACTIVE | Noted: 2022-08-04

## 2022-08-04 LAB
FOLATE SERPL-MCNC: >20 NG/ML (ref 8.7–?)
HCYS SERPL-SCNC: 10.7 UMOL/L (ref 3.2–10.7)
TSI SER-ACNC: 2.25 MIU/ML (ref 0.36–3.74)
VIT B12 SERPL-MCNC: 445 PG/ML (ref 193–986)

## 2022-08-04 PROCEDURE — 86780 TREPONEMA PALLIDUM: CPT

## 2022-08-04 PROCEDURE — 82607 VITAMIN B-12: CPT

## 2022-08-04 PROCEDURE — 84443 ASSAY THYROID STIM HORMONE: CPT

## 2022-08-04 PROCEDURE — 82746 ASSAY OF FOLIC ACID SERUM: CPT

## 2022-08-04 PROCEDURE — 83090 ASSAY OF HOMOCYSTEINE: CPT

## 2022-08-04 PROCEDURE — 36415 COLL VENOUS BLD VENIPUNCTURE: CPT

## 2022-08-04 PROCEDURE — 83921 ORGANIC ACID SINGLE QUANT: CPT

## 2022-08-04 PROCEDURE — 84425 ASSAY OF VITAMIN B-1: CPT

## 2022-08-04 NOTE — PATIENT INSTRUCTIONS
1.you have a significant family history of dementia. It is possible some of your symptoms are due to early onset dementia. You need to get the memory testing (neuropsychological testing with Dr. Ana Schulz) completed. 2. Your symptoms are partially due to untreated sleep apnea. You need another sleep study. You need to see a sleep medicine doctor. 3. We need to make sure you have not had a stroke due to sickle cell. Please get the mri brain and cta head and neck. 4. Please get the blood work    5.  Please get your hearing tested

## 2022-08-05 ENCOUNTER — TELEPHONE (OUTPATIENT)
Dept: HEMATOLOGY/ONCOLOGY | Facility: HOSPITAL | Age: 62
End: 2022-08-05

## 2022-08-05 DIAGNOSIS — Z79.891 LONG TERM (CURRENT) USE OF OPIATE ANALGESIC: ICD-10-CM

## 2022-08-05 DIAGNOSIS — D57.00 SICKLE CELL ANEMIA WITH CRISIS (HCC): ICD-10-CM

## 2022-08-05 DIAGNOSIS — G89.29 OTHER CHRONIC PAIN: ICD-10-CM

## 2022-08-05 LAB — T PALLIDUM AB SER QL: NEGATIVE

## 2022-08-05 RX ORDER — HYDROCODONE BITARTRATE AND ACETAMINOPHEN 10; 325 MG/1; MG/1
TABLET ORAL
Qty: 90 TABLET | Refills: 0 | Status: SHIPPED | OUTPATIENT
Start: 2022-08-05

## 2022-08-05 RX ORDER — WARFARIN SODIUM 5 MG/1
5 TABLET ORAL DAILY
Qty: 90 TABLET | Refills: 1 | Status: SHIPPED | OUTPATIENT
Start: 2022-08-05

## 2022-08-05 NOTE — TELEPHONE ENCOUNTER
Patient called (identified name and ),   Asking for refill of hydrocodone. Was advised to contact Dr Alvin Huerta office who prescribed previous. She agreed to call that office. Also seeking refill of warfarin. States she takes one 5 mg tablet daily, and has been doing this long term. Advised her of Anticoagulation visit notes from 2022 which show schedule of warfarin 5 mg on , Monday, Tuesday, Thursday, and Friday. Supposed to take 7.5 mg on Wednesday and Saturday. Coumadin Clinic, [facility], patient never increased dose to 7.5 on Wednesday and Saturday. Dr Enma Faulkner, order pended for 5 mg dose daily,   Please advise.

## 2022-08-05 NOTE — TELEPHONE ENCOUNTER
Daphne called requesting refill of Norco 10/325 mg for pain she only has a few left, send to [facility] in [place].

## 2022-08-07 LAB — MMA: 0.19 UMOL/L

## 2022-08-08 LAB — VITAMIN B1 (THIAMINE), WHOLE B: 71 NMOL/L

## 2022-08-12 ENCOUNTER — HOSPITAL ENCOUNTER (OUTPATIENT)
Dept: CT IMAGING | Facility: HOSPITAL | Age: 62
Discharge: HOME | End: 2022-08-12
Attending: FAMILY MEDICINE
Payer: COMMERCIAL

## 2022-08-12 DIAGNOSIS — R51.9 NONINTRACTABLE EPISODIC HEADACHE, UNSPECIFIED HEADACHE TYPE: ICD-10-CM

## 2022-08-12 DIAGNOSIS — D57.00 SICKLE CELL DISEASE WITH CRISIS (HCC): ICD-10-CM

## 2022-08-12 LAB
CREAT BLD-MCNC: 0.9 MG/DL
GFR SERPLBLD BASED ON 1.73 SQ M-ARVRAT: 73 ML/MIN/1.73M2 (ref 60–?)

## 2022-08-12 PROCEDURE — 82565 ASSAY OF CREATININE: CPT

## 2022-08-12 PROCEDURE — 70470 CT HEAD/BRAIN W/O & W/DYE: CPT | Performed by: FAMILY MEDICINE

## 2022-08-16 ENCOUNTER — HOSPITAL ENCOUNTER (OUTPATIENT)
Dept: MAMMOGRAPHY | Facility: HOSPITAL | Age: 62
Discharge: HOME | End: 2022-08-16
Attending: FAMILY MEDICINE
Payer: COMMERCIAL

## 2022-08-16 DIAGNOSIS — Z12.31 ENCOUNTER FOR MAMMOGRAM TO ESTABLISH BASELINE MAMMOGRAM: ICD-10-CM

## 2022-08-16 PROCEDURE — 77063 BREAST TOMOSYNTHESIS BI: CPT | Performed by: FAMILY MEDICINE

## 2022-08-16 PROCEDURE — 77067 SCR MAMMO BI INCL CAD: CPT | Performed by: FAMILY MEDICINE

## 2022-08-24 ENCOUNTER — ANTI-COAG VISIT (OUTPATIENT)
Dept: ANTICOAGULATION | Facility: CLINIC | Age: 62
End: 2022-08-24
Payer: COMMERCIAL

## 2022-08-24 DIAGNOSIS — D57.20 SICKLE CELL DISEASE, TYPE SC, WITHOUT CRISIS (HCC): ICD-10-CM

## 2022-08-24 DIAGNOSIS — Z51.81 ENCOUNTER FOR THERAPEUTIC DRUG MONITORING: ICD-10-CM

## 2022-08-24 DIAGNOSIS — D57.00 HB-SS DISEASE WITH CRISIS (HCC): ICD-10-CM

## 2022-08-24 DIAGNOSIS — Z79.01 LONG TERM (CURRENT) USE OF ANTICOAGULANTS: ICD-10-CM

## 2022-08-24 DIAGNOSIS — Z79.01 LONG TERM CURRENT USE OF ANTICOAGULANT THERAPY: ICD-10-CM

## 2022-08-24 LAB
INR: 1.6 (ref 0.8–1.2)
TEST STRIP EXPIRATION DATE: ABNORMAL DATE

## 2022-08-24 PROCEDURE — 93793 ANTICOAG MGMT PT WARFARIN: CPT | Performed by: INTERNAL MEDICINE

## 2022-08-24 PROCEDURE — 85610 PROTHROMBIN TIME: CPT | Performed by: INTERNAL MEDICINE

## 2022-08-25 ENCOUNTER — HOSPITAL ENCOUNTER (EMERGENCY)
Facility: HOSPITAL | Age: 62
Discharge: HOME | End: 2022-08-26
Attending: EMERGENCY MEDICINE
Payer: COMMERCIAL

## 2022-08-25 ENCOUNTER — HOSPITAL ENCOUNTER (OUTPATIENT)
Dept: CT IMAGING | Facility: HOSPITAL | Age: 62
Discharge: HOME | End: 2022-08-25
Attending: FAMILY MEDICINE
Payer: COMMERCIAL

## 2022-08-25 DIAGNOSIS — K11.20 PAROTITIS: Primary | ICD-10-CM

## 2022-08-25 DIAGNOSIS — R93.89 ABNORMAL RADIOGRAPH: ICD-10-CM

## 2022-08-25 PROCEDURE — 99284 EMERGENCY DEPT VISIT MOD MDM: CPT

## 2022-08-25 PROCEDURE — 71250 CT THORAX DX C-: CPT | Performed by: FAMILY MEDICINE

## 2022-08-25 PROCEDURE — 96360 HYDRATION IV INFUSION INIT: CPT

## 2022-08-25 PROCEDURE — 96361 HYDRATE IV INFUSION ADD-ON: CPT

## 2022-08-26 ENCOUNTER — APPOINTMENT (OUTPATIENT)
Dept: CT IMAGING | Facility: HOSPITAL | Age: 62
End: 2022-08-26
Attending: EMERGENCY MEDICINE
Payer: COMMERCIAL

## 2022-08-26 VITALS
HEIGHT: 62 IN | SYSTOLIC BLOOD PRESSURE: 138 MMHG | TEMPERATURE: 98 F | HEART RATE: 63 BPM | RESPIRATION RATE: 18 BRPM | WEIGHT: 201.5 LBS | OXYGEN SATURATION: 92 % | DIASTOLIC BLOOD PRESSURE: 76 MMHG | BODY MASS INDEX: 37.08 KG/M2

## 2022-08-26 LAB
ANION GAP SERPL CALC-SCNC: 6 MMOL/L (ref 0–18)
BASOPHILS # BLD AUTO: 0.06 X10(3) UL (ref 0–0.2)
BASOPHILS NFR BLD AUTO: 0.7 %
BUN BLD-MCNC: 9 MG/DL (ref 7–18)
BUN/CREAT SERPL: 10.3 (ref 10–20)
CALCIUM BLD-MCNC: 8.8 MG/DL (ref 8.5–10.1)
CHLORIDE SERPL-SCNC: 111 MMOL/L (ref 98–112)
CO2 SERPL-SCNC: 27 MMOL/L (ref 21–32)
CREAT BLD-MCNC: 0.87 MG/DL
DEPRECATED RDW RBC AUTO: 47.9 FL (ref 35.1–46.3)
EOSINOPHIL # BLD AUTO: 0.58 X10(3) UL (ref 0–0.7)
EOSINOPHIL NFR BLD AUTO: 6.3 %
ERYTHROCYTE [DISTWIDTH] IN BLOOD BY AUTOMATED COUNT: 15.2 % (ref 11–15)
GFR SERPLBLD BASED ON 1.73 SQ M-ARVRAT: 76 ML/MIN/1.73M2 (ref 60–?)
GLUCOSE BLD-MCNC: 101 MG/DL (ref 70–99)
HCT VFR BLD AUTO: 28.4 %
HGB BLD-MCNC: 10.3 G/DL
IMM GRANULOCYTES # BLD AUTO: 0.04 X10(3) UL (ref 0–1)
IMM GRANULOCYTES NFR BLD: 0.4 %
LYMPHOCYTES # BLD AUTO: 2.49 X10(3) UL (ref 1–4)
LYMPHOCYTES NFR BLD AUTO: 27 %
MCH RBC QN AUTO: 31.4 PG (ref 26–34)
MCHC RBC AUTO-ENTMCNC: 36.3 G/DL (ref 31–37)
MCV RBC AUTO: 86.6 FL
MONOCYTES # BLD AUTO: 1.4 X10(3) UL (ref 0.1–1)
MONOCYTES NFR BLD AUTO: 15.2 %
NEUTROPHILS # BLD AUTO: 4.65 X10 (3) UL (ref 1.5–7.7)
NEUTROPHILS # BLD AUTO: 4.65 X10(3) UL (ref 1.5–7.7)
NEUTROPHILS NFR BLD AUTO: 50.4 %
OSMOLALITY SERPL CALC.SUM OF ELEC: 297 MOSM/KG (ref 275–295)
PLATELET # BLD AUTO: 313 10(3)UL (ref 150–450)
POTASSIUM SERPL-SCNC: 4.2 MMOL/L (ref 3.5–5.1)
RBC # BLD AUTO: 3.28 X10(6)UL
SODIUM SERPL-SCNC: 144 MMOL/L (ref 136–145)
WBC # BLD AUTO: 9.2 X10(3) UL (ref 4–11)

## 2022-08-26 PROCEDURE — 85025 COMPLETE CBC W/AUTO DIFF WBC: CPT | Performed by: EMERGENCY MEDICINE

## 2022-08-26 PROCEDURE — 70491 CT SOFT TISSUE NECK W/DYE: CPT | Performed by: EMERGENCY MEDICINE

## 2022-08-26 PROCEDURE — 80048 BASIC METABOLIC PNL TOTAL CA: CPT | Performed by: EMERGENCY MEDICINE

## 2022-08-26 RX ORDER — AMOXICILLIN AND CLAVULANATE POTASSIUM 875; 125 MG/1; MG/1
1 TABLET, FILM COATED ORAL 2 TIMES DAILY
Qty: 20 TABLET | Refills: 0 | Status: SHIPPED | OUTPATIENT
Start: 2022-08-26 | End: 2022-09-05

## 2022-08-26 NOTE — ED QUICK NOTES
Care taken over ,pt A&Ox3 non labored breathing speaks clear full sentences, pt resting on cart in position of comfort, call light within reach, no distress noted.

## 2022-08-26 NOTE — ED INITIAL ASSESSMENT (HPI)
Pt to ED with c/o salivary gland swelling and under eye swelling that started yesterday and has gotten worse today. Pt has hx of sickle cell. Pt denies difficulty swallowing/breathing.

## 2022-08-30 ENCOUNTER — TELEPHONE (OUTPATIENT)
Dept: FAMILY MEDICINE CLINIC | Facility: CLINIC | Age: 62
End: 2022-08-30

## 2022-08-30 DIAGNOSIS — R59.0 CERVICAL LYMPHADENOPATHY: Primary | ICD-10-CM

## 2022-08-30 RX ORDER — LIDOCAINE 50 MG/G
PATCH TOPICAL
Qty: 60 PATCH | Refills: 2 | Status: SHIPPED | OUTPATIENT
Start: 2022-08-30

## 2022-08-30 RX ORDER — AMLODIPINE BESYLATE AND BENAZEPRIL HYDROCHLORIDE 10; 20 MG/1; MG/1
1 CAPSULE ORAL DAILY
Qty: 90 CAPSULE | Refills: 0 | Status: SHIPPED | OUTPATIENT
Start: 2022-08-30

## 2022-08-30 NOTE — TELEPHONE ENCOUNTER
La Nena Vargas calling asking for refill on lidocaine 5 % External Patch. NOTE PT HAS NEW PHARMACY!  [address] [facility]

## 2022-08-30 NOTE — TELEPHONE ENCOUNTER
Amlodipine sent to mail order but patient would like at local pharmacy. Resent remaining refill to preferred pharmacy.

## 2022-09-07 ENCOUNTER — HOSPITAL ENCOUNTER (OUTPATIENT)
Dept: MRI IMAGING | Facility: HOSPITAL | Age: 62
Discharge: HOME | End: 2022-09-07
Attending: Other
Payer: COMMERCIAL

## 2022-09-07 DIAGNOSIS — D57.20 SICKLE CELL DISEASE, TYPE SC, WITHOUT CRISIS (HCC): ICD-10-CM

## 2022-09-07 DIAGNOSIS — Z81.8 FAMILY HISTORY OF DEMENTIA: ICD-10-CM

## 2022-09-07 DIAGNOSIS — G31.84 MCI (MILD COGNITIVE IMPAIRMENT): ICD-10-CM

## 2022-09-07 PROCEDURE — 70551 MRI BRAIN STEM W/O DYE: CPT | Performed by: OTHER

## 2022-09-08 ENCOUNTER — HOSPITAL ENCOUNTER (OUTPATIENT)
Dept: ULTRASOUND IMAGING | Facility: HOSPITAL | Age: 62
Discharge: HOME | End: 2022-09-08
Attending: FAMILY MEDICINE
Payer: COMMERCIAL

## 2022-09-08 DIAGNOSIS — R93.89 ABNORMAL RADIOGRAPH: ICD-10-CM

## 2022-09-08 PROCEDURE — 76775 US EXAM ABDO BACK WALL LIM: CPT | Performed by: FAMILY MEDICINE

## 2022-09-09 ENCOUNTER — TELEPHONE (OUTPATIENT)
Dept: FAMILY MEDICINE CLINIC | Facility: CLINIC | Age: 62
End: 2022-09-09

## 2022-09-09 RX ORDER — OXYBUTYNIN CHLORIDE 5 MG/1
5 TABLET ORAL DAILY
Qty: 90 TABLET | Refills: 1 | Status: SHIPPED | OUTPATIENT
Start: 2022-09-09

## 2022-09-12 ENCOUNTER — TELEPHONE (OUTPATIENT)
Dept: GASTROENTEROLOGY | Facility: CLINIC | Age: 62
End: 2022-09-12

## 2022-09-12 NOTE — TELEPHONE ENCOUNTER
1st overdue reminder letter mailed to patient   Lab :                             PROTHROMBIN TIME (PT)

## 2022-09-15 ENCOUNTER — OFFICE VISIT (OUTPATIENT)
Dept: FAMILY MEDICINE CLINIC | Facility: CLINIC | Age: 62
End: 2022-09-15
Payer: COMMERCIAL

## 2022-09-15 VITALS
BODY MASS INDEX: 37.17 KG/M2 | WEIGHT: 202 LBS | HEIGHT: 62 IN | DIASTOLIC BLOOD PRESSURE: 77 MMHG | HEART RATE: 85 BPM | SYSTOLIC BLOOD PRESSURE: 129 MMHG

## 2022-09-15 DIAGNOSIS — I10 ESSENTIAL HYPERTENSION: ICD-10-CM

## 2022-09-15 DIAGNOSIS — R93.89 ABNORMAL RADIOGRAPH: Primary | ICD-10-CM

## 2022-09-15 DIAGNOSIS — K11.20 PAROTITIS: ICD-10-CM

## 2022-09-15 PROCEDURE — 3078F DIAST BP <80 MM HG: CPT | Performed by: FAMILY MEDICINE

## 2022-09-15 PROCEDURE — 3008F BODY MASS INDEX DOCD: CPT | Performed by: FAMILY MEDICINE

## 2022-09-15 PROCEDURE — 99214 OFFICE O/P EST MOD 30 MIN: CPT | Performed by: FAMILY MEDICINE

## 2022-09-15 PROCEDURE — 3074F SYST BP LT 130 MM HG: CPT | Performed by: FAMILY MEDICINE

## 2022-09-21 ENCOUNTER — ANTI-COAG VISIT (OUTPATIENT)
Dept: ANTICOAGULATION | Facility: CLINIC | Age: 62
End: 2022-09-21

## 2022-09-21 ENCOUNTER — TELEPHONE (OUTPATIENT)
Dept: HEMATOLOGY/ONCOLOGY | Facility: HOSPITAL | Age: 62
End: 2022-09-21

## 2022-09-21 DIAGNOSIS — Z79.01 LONG TERM (CURRENT) USE OF ANTICOAGULANTS: ICD-10-CM

## 2022-09-21 DIAGNOSIS — D57.20 SICKLE CELL DISEASE, TYPE SC, WITHOUT CRISIS (HCC): ICD-10-CM

## 2022-09-21 DIAGNOSIS — D57.00 HB-SS DISEASE WITH CRISIS (HCC): ICD-10-CM

## 2022-09-21 DIAGNOSIS — Z79.01 LONG TERM CURRENT USE OF ANTICOAGULANT THERAPY: ICD-10-CM

## 2022-09-21 DIAGNOSIS — Z51.81 ENCOUNTER FOR THERAPEUTIC DRUG MONITORING: ICD-10-CM

## 2022-09-21 LAB — INR: 3.4 (ref 0.8–1.2)

## 2022-09-21 PROCEDURE — 93793 ANTICOAG MGMT PT WARFARIN: CPT | Performed by: INTERNAL MEDICINE

## 2022-09-21 PROCEDURE — 85610 PROTHROMBIN TIME: CPT | Performed by: INTERNAL MEDICINE

## 2022-09-21 NOTE — TELEPHONE ENCOUNTER
Daphne called to request a refill of her Norco . She request's it be [facility] [address] [place] [address] in [place], [place].

## 2022-09-22 DIAGNOSIS — Z79.891 LONG TERM (CURRENT) USE OF OPIATE ANALGESIC: ICD-10-CM

## 2022-09-22 DIAGNOSIS — D57.00 SICKLE CELL ANEMIA WITH CRISIS (HCC): ICD-10-CM

## 2022-09-22 DIAGNOSIS — G89.29 OTHER CHRONIC PAIN: ICD-10-CM

## 2022-09-22 RX ORDER — HYDROCODONE BITARTRATE AND ACETAMINOPHEN 10; 325 MG/1; MG/1
TABLET ORAL
Qty: 90 TABLET | Refills: 0 | Status: SHIPPED | OUTPATIENT
Start: 2022-09-22

## 2022-09-29 ENCOUNTER — OFFICE VISIT (OUTPATIENT)
Dept: HEMATOLOGY/ONCOLOGY | Facility: HOSPITAL | Age: 62
End: 2022-09-29
Attending: INTERNAL MEDICINE

## 2022-09-29 ENCOUNTER — TELEPHONE (OUTPATIENT)
Dept: HEMATOLOGY/ONCOLOGY | Facility: HOSPITAL | Age: 62
End: 2022-09-29

## 2022-09-29 VITALS
RESPIRATION RATE: 18 BRPM | DIASTOLIC BLOOD PRESSURE: 67 MMHG | OXYGEN SATURATION: 94 % | TEMPERATURE: 98 F | SYSTOLIC BLOOD PRESSURE: 136 MMHG | HEART RATE: 73 BPM

## 2022-09-29 DIAGNOSIS — D57.00 SICKLE CELL ANEMIA WITH CRISIS (HCC): Primary | ICD-10-CM

## 2022-09-29 LAB
BASOPHILS # BLD AUTO: 0.05 X10(3) UL (ref 0–0.2)
BASOPHILS NFR BLD AUTO: 0.6 %
DEPRECATED RDW RBC AUTO: 47.5 FL (ref 35.1–46.3)
EOSINOPHIL # BLD AUTO: 0.42 X10(3) UL (ref 0–0.7)
EOSINOPHIL NFR BLD AUTO: 4.7 %
ERYTHROCYTE [DISTWIDTH] IN BLOOD BY AUTOMATED COUNT: 15.7 % (ref 11–15)
HCT VFR BLD AUTO: 30.8 %
HGB BLD-MCNC: 11.1 G/DL
HGB RETIC QN AUTO: 32.9 PG (ref 28.2–36.6)
IMM GRANULOCYTES # BLD AUTO: 0.02 X10(3) UL (ref 0–1)
IMM GRANULOCYTES NFR BLD: 0.2 %
IMM RETICS NFR: 0.28 RATIO (ref 0.1–0.3)
LYMPHOCYTES # BLD AUTO: 2.49 X10(3) UL (ref 1–4)
LYMPHOCYTES NFR BLD AUTO: 28.1 %
MCH RBC QN AUTO: 30.7 PG (ref 26–34)
MCHC RBC AUTO-ENTMCNC: 36 G/DL (ref 31–37)
MCV RBC AUTO: 85.3 FL
MONOCYTES # BLD AUTO: 0.97 X10(3) UL (ref 0.1–1)
MONOCYTES NFR BLD AUTO: 10.9 %
NEUTROPHILS # BLD AUTO: 4.91 X10 (3) UL (ref 1.5–7.7)
NEUTROPHILS # BLD AUTO: 4.91 X10(3) UL (ref 1.5–7.7)
NEUTROPHILS NFR BLD AUTO: 55.5 %
PLATELET # BLD AUTO: 326 10(3)UL (ref 150–450)
RBC # BLD AUTO: 3.61 X10(6)UL
RETICS # AUTO: 137.5 X10(3) UL (ref 22.5–147.5)
RETICS/RBC NFR AUTO: 3.8 %
WBC # BLD AUTO: 8.9 X10(3) UL (ref 4–11)

## 2022-09-29 PROCEDURE — 85045 AUTOMATED RETICULOCYTE COUNT: CPT

## 2022-09-29 PROCEDURE — 96360 HYDRATION IV INFUSION INIT: CPT

## 2022-09-29 PROCEDURE — 85025 COMPLETE CBC W/AUTO DIFF WBC: CPT

## 2022-09-29 NOTE — TELEPHONE ENCOUNTER
Dr Rae Ruiz patient - sickle cell disease. Back Pain: (Patient is requesting IV hydration for sickle cell crisis. She has lower back pain. Her pain is \"10/10. \" She just took one tab of Norco .)    Camden Rangel, RN for Dr Rae Ruiz updated. Azalia Mayen given to book for IV hydration. Infusion RN to call [facility] if patient is having a great deal of pain. I spoke with   Tami Morales Infusion Scheduling. She booked her for 12:30 pm. I updated Daphne and she agreed to the appointment.

## 2022-09-29 NOTE — PROGRESS NOTES
Pt called this morning requesting hydration to help manage sickle cell pain. PRN orders for fluids in. Upon assessment, pt states that the pain started in the middle of the night, it is to her lower back, 10/10. She is taking Norco.  Labs drawn. 1 liter infused. She stated the pain was improved and she did not wish to have another liter as her family was waiting for her in the car with her granddaughter. Discharged home ambulatory.

## 2022-09-29 NOTE — TELEPHONE ENCOUNTER
Patient wants to make an appointment to come in today for fluids. She has a lot of lower back pain. Please call. Thank you.

## 2022-10-13 ENCOUNTER — HOSPITAL ENCOUNTER (OUTPATIENT)
Dept: MRI IMAGING | Facility: HOSPITAL | Age: 62
Discharge: HOME | End: 2022-10-13
Attending: FAMILY MEDICINE
Payer: COMMERCIAL

## 2022-10-13 DIAGNOSIS — R93.89 ABNORMAL RADIOGRAPH: ICD-10-CM

## 2022-10-13 LAB
CREAT BLD-MCNC: 0.9 MG/DL
GFR SERPLBLD BASED ON 1.73 SQ M-ARVRAT: 73 ML/MIN/1.73M2 (ref 60–?)

## 2022-10-13 PROCEDURE — A9575 INJ GADOTERATE MEGLUMI 0.1ML: HCPCS | Performed by: FAMILY MEDICINE

## 2022-10-13 PROCEDURE — 82565 ASSAY OF CREATININE: CPT

## 2022-10-13 PROCEDURE — 74183 MRI ABD W/O CNTR FLWD CNTR: CPT | Performed by: FAMILY MEDICINE

## 2022-10-17 ENCOUNTER — LAB ENCOUNTER (OUTPATIENT)
Dept: LAB | Facility: HOSPITAL | Age: 62
End: 2022-10-17
Attending: INTERNAL MEDICINE
Payer: COMMERCIAL

## 2022-10-17 DIAGNOSIS — Z01.818 PRE-OP TESTING: ICD-10-CM

## 2022-10-18 LAB — SARS-COV-2 RNA RESP QL NAA+PROBE: NOT DETECTED

## 2022-10-20 ENCOUNTER — HOSPITAL ENCOUNTER (OUTPATIENT)
Facility: HOSPITAL | Age: 62
Setting detail: HOSPITAL OUTPATIENT SURGERY
Discharge: HOME | End: 2022-10-20
Attending: INTERNAL MEDICINE | Admitting: INTERNAL MEDICINE
Payer: COMMERCIAL

## 2022-10-20 ENCOUNTER — ANESTHESIA EVENT (OUTPATIENT)
Dept: ENDOSCOPY | Facility: HOSPITAL | Age: 62
End: 2022-10-20
Payer: COMMERCIAL

## 2022-10-20 ENCOUNTER — ANESTHESIA (OUTPATIENT)
Dept: ENDOSCOPY | Facility: HOSPITAL | Age: 62
End: 2022-10-20
Payer: COMMERCIAL

## 2022-10-20 VITALS
HEIGHT: 62 IN | SYSTOLIC BLOOD PRESSURE: 128 MMHG | RESPIRATION RATE: 16 BRPM | HEART RATE: 58 BPM | TEMPERATURE: 98 F | WEIGHT: 205 LBS | OXYGEN SATURATION: 87 % | DIASTOLIC BLOOD PRESSURE: 75 MMHG | BODY MASS INDEX: 37.73 KG/M2

## 2022-10-20 DIAGNOSIS — R10.9 ABDOMINAL PAIN, UNSPECIFIED ABDOMINAL LOCATION: ICD-10-CM

## 2022-10-20 DIAGNOSIS — Z12.11 COLON CANCER SCREENING: ICD-10-CM

## 2022-10-20 DIAGNOSIS — Z01.818 PRE-OP TESTING: Primary | ICD-10-CM

## 2022-10-20 DIAGNOSIS — K59.00 CONSTIPATION, UNSPECIFIED CONSTIPATION TYPE: ICD-10-CM

## 2022-10-20 LAB
INR BLD: 1.49 (ref 0.85–1.16)
PROTHROMBIN TIME: 18 SECONDS (ref 11.6–14.8)

## 2022-10-20 PROCEDURE — 45385 COLONOSCOPY W/LESION REMOVAL: CPT | Performed by: INTERNAL MEDICINE

## 2022-10-20 PROCEDURE — 0DB68ZX EXCISION OF STOMACH, VIA NATURAL OR ARTIFICIAL OPENING ENDOSCOPIC, DIAGNOSTIC: ICD-10-PCS | Performed by: INTERNAL MEDICINE

## 2022-10-20 PROCEDURE — 43239 EGD BIOPSY SINGLE/MULTIPLE: CPT | Performed by: INTERNAL MEDICINE

## 2022-10-20 PROCEDURE — 0DBH8ZX EXCISION OF CECUM, VIA NATURAL OR ARTIFICIAL OPENING ENDOSCOPIC, DIAGNOSTIC: ICD-10-PCS | Performed by: INTERNAL MEDICINE

## 2022-10-20 RX ORDER — LIDOCAINE HYDROCHLORIDE 10 MG/ML
INJECTION, SOLUTION EPIDURAL; INFILTRATION; INTRACAUDAL; PERINEURAL AS NEEDED
Status: DISCONTINUED | OUTPATIENT
Start: 2022-10-20 | End: 2022-10-20 | Stop reason: SURG

## 2022-10-20 RX ORDER — SODIUM CHLORIDE, SODIUM LACTATE, POTASSIUM CHLORIDE, CALCIUM CHLORIDE 600; 310; 30; 20 MG/100ML; MG/100ML; MG/100ML; MG/100ML
INJECTION, SOLUTION INTRAVENOUS CONTINUOUS
Status: DISCONTINUED | OUTPATIENT
Start: 2022-10-20 | End: 2022-10-20

## 2022-10-20 RX ADMIN — SODIUM CHLORIDE, SODIUM LACTATE, POTASSIUM CHLORIDE, CALCIUM CHLORIDE: 600; 310; 30; 20 INJECTION, SOLUTION INTRAVENOUS at 11:07:00

## 2022-10-20 RX ADMIN — LIDOCAINE HYDROCHLORIDE 50 MG: 10 INJECTION, SOLUTION EPIDURAL; INFILTRATION; INTRACAUDAL; PERINEURAL at 11:10:00

## 2022-10-20 NOTE — OPERATIVE REPORT
[facility] Endoscopy Report      Preoperative Diagnosis:  - colon cancer screening  - abdominal pain      Postoperative Diagnosis:  - colon polyps x 2  - small internal hemorrhoids  - gastritis      Procedure:    Colonoscopy   Esophagogastroduodenoscopy       Surgeon:  Rodriguez Tejeda M.D. Anesthesia:  MAC     Technique:  After informed consent, the patient was placed in the left lateral recumbent position. Digital rectal examination revealed no palpable intraluminal abnormalities. An Olympus variable stiffness 190 series HD colonoscope was inserted into the rectum and advanced under direct vision by following the lumen to the cecum. The colon was examined upon withdrawal in the left lateral position. Following colonoscopy, an Olympus adult HD gastroscope was inserted into the hypopharynx and advanced under direct vision into the esophagus, stomach and duodenum. The endoscope was withdrawn to the stomach where retroflexion of the annulus, body, cardia and fundus was performed. The instrument was straightened, insufflated air and fluid were suctioned and the endoscope was withdrawn. The procedures were well tolerated without immediate complication. Findings:  The preparation of the colon was good. The terminal ileum was examined for 4 cm and visually normal.  The ileocecal valve was well preserved. The visualized colonic mucosa from the cecum to the anal verge was normal with an intact vascular pattern. Colon polyps x2 both removed from the cecum, first polyp was sessile approximately 4 mm in size and removed by cold snare, second polyp was sessile and 3 mm in size removed by cold snare technique. Both sites were inspected and found to be free bleeding specimens retrieved and sent for analysis. Small internal hemorrhoids noted on retroflexed view. The esophagus was normal.  The GE junction and diaphragmatic impression were at 39 cm, no hiatal hernia.   The stomach distended appropriately with insufflated air. The mucosa of the stomach showed subtle gastric erythema, biopsies taken, no ulcers or erosions noted. The duodenal bulb and post bulbar regions were normal.    Estimated blood loss-insignificant  Specimens-see above    Impression:  - colon polyps x 2  - small internal hemorrhoids  - gastritis    Recommendations:  - Post polypectomy instructions given  - Repeat colonoscopy in 5- 10 years   - Symptomatic treatment of hemorrhoids  - Follow up on biopsies stomach         Marixa Scherer.  Wm Sparrow MD  10/20/2022  11:45 AM

## 2022-10-24 ENCOUNTER — TELEPHONE (OUTPATIENT)
Dept: ANTICOAGULATION | Facility: CLINIC | Age: 62
End: 2022-10-24

## 2022-10-24 ENCOUNTER — TELEPHONE (OUTPATIENT)
Dept: GASTROENTEROLOGY | Facility: CLINIC | Age: 62
End: 2022-10-24

## 2022-10-24 NOTE — TELEPHONE ENCOUNTER
Health Maintenance Updated. 5 year recall colonoscopy entered into patient outreach in [address] [place] [address] Rd. Next colonoscopy will be due 10/20/2027.

## 2022-10-24 NOTE — TELEPHONE ENCOUNTER
Chart reviewed, due for INR on 10/19- appointment cancelled due to GI testing. Spoke with Daphne. Appointment scheduled for 11/2.

## 2022-10-24 NOTE — TELEPHONE ENCOUNTER
----- Message from Chucho Arora MD sent at 10/21/2022  3:21 PM CDT -----  I wanted to get back to you with your colonoscopy and EGD results. You had 2 colon polyps removed which were benign. I would advise a repeat colonoscopy in 5 years to make sure no new polyps are forming. You also have internal hemorrhoids. The upper endoscopy showed some irritation of the stomach (mild) and samples taken were negative for h pylori infection. Avoid spicy foods and caffeine. Follow up with a surgeon for the abdominal hernia. Call [phone] to set up.

## 2022-10-28 ENCOUNTER — HOSPITAL ENCOUNTER (OUTPATIENT)
Dept: CT IMAGING | Facility: HOSPITAL | Age: 62
Discharge: HOME | End: 2022-10-28
Attending: FAMILY MEDICINE
Payer: COMMERCIAL

## 2022-10-28 DIAGNOSIS — R59.0 CERVICAL LYMPHADENOPATHY: ICD-10-CM

## 2022-10-28 LAB
CREAT BLD-MCNC: 0.8 MG/DL
GFR SERPLBLD BASED ON 1.73 SQ M-ARVRAT: 84 ML/MIN/1.73M2 (ref 60–?)

## 2022-10-28 PROCEDURE — 82565 ASSAY OF CREATININE: CPT

## 2022-10-28 PROCEDURE — 70491 CT SOFT TISSUE NECK W/DYE: CPT | Performed by: FAMILY MEDICINE

## 2022-11-02 ENCOUNTER — ANTI-COAG VISIT (OUTPATIENT)
Dept: ANTICOAGULATION | Facility: CLINIC | Age: 62
End: 2022-11-02
Payer: COMMERCIAL

## 2022-11-02 DIAGNOSIS — Z51.81 ENCOUNTER FOR THERAPEUTIC DRUG MONITORING: ICD-10-CM

## 2022-11-02 DIAGNOSIS — D57.00 HB-SS DISEASE WITH CRISIS (HCC): ICD-10-CM

## 2022-11-02 DIAGNOSIS — Z79.01 LONG TERM (CURRENT) USE OF ANTICOAGULANTS: ICD-10-CM

## 2022-11-02 DIAGNOSIS — Z79.01 LONG TERM CURRENT USE OF ANTICOAGULANT THERAPY: ICD-10-CM

## 2022-11-02 DIAGNOSIS — D57.20 SICKLE CELL DISEASE, TYPE SC, WITHOUT CRISIS (HCC): ICD-10-CM

## 2022-11-02 LAB — INR: 2.1 (ref 0.8–1.2)

## 2022-11-02 PROCEDURE — 93793 ANTICOAG MGMT PT WARFARIN: CPT | Performed by: FAMILY MEDICINE

## 2022-11-02 PROCEDURE — 85610 PROTHROMBIN TIME: CPT | Performed by: FAMILY MEDICINE

## 2022-11-09 ENCOUNTER — TELEPHONE (OUTPATIENT)
Dept: HEMATOLOGY/ONCOLOGY | Facility: HOSPITAL | Age: 62
End: 2022-11-09

## 2022-11-09 DIAGNOSIS — D57.00 SICKLE CELL ANEMIA WITH CRISIS (HCC): ICD-10-CM

## 2022-11-09 DIAGNOSIS — Z79.891 LONG TERM (CURRENT) USE OF OPIATE ANALGESIC: ICD-10-CM

## 2022-11-09 DIAGNOSIS — G89.29 OTHER CHRONIC PAIN: ICD-10-CM

## 2022-11-09 RX ORDER — LIDOCAINE 50 MG/G
PATCH TOPICAL
Qty: 60 PATCH | Refills: 2 | Status: SHIPPED | OUTPATIENT
Start: 2022-11-09

## 2022-11-09 RX ORDER — HYDROCODONE BITARTRATE AND ACETAMINOPHEN 10; 325 MG/1; MG/1
TABLET ORAL
Qty: 90 TABLET | Refills: 0 | Status: SHIPPED | OUTPATIENT
Start: 2022-11-09 | End: 2022-11-10

## 2022-11-09 NOTE — TELEPHONE ENCOUNTER
Patient called requesting refill of Norco 10/325 mg and Lidocaine 5% External Patch - she is out of patches.   Please send to [facility] in [facility]

## 2022-11-10 ENCOUNTER — TELEPHONE (OUTPATIENT)
Dept: HEMATOLOGY/ONCOLOGY | Facility: HOSPITAL | Age: 62
End: 2022-11-10

## 2022-11-10 DIAGNOSIS — G89.29 OTHER CHRONIC PAIN: ICD-10-CM

## 2022-11-10 DIAGNOSIS — Z79.891 LONG TERM (CURRENT) USE OF OPIATE ANALGESIC: ICD-10-CM

## 2022-11-10 DIAGNOSIS — D57.00 SICKLE CELL ANEMIA WITH CRISIS (HCC): ICD-10-CM

## 2022-11-10 RX ORDER — AMLODIPINE BESYLATE AND BENAZEPRIL HYDROCHLORIDE 10; 20 MG/1; MG/1
1 CAPSULE ORAL DAILY
Qty: 90 CAPSULE | Refills: 1 | Status: SHIPPED | OUTPATIENT
Start: 2022-11-10

## 2022-11-10 RX ORDER — HYDROCODONE BITARTRATE AND ACETAMINOPHEN 10; 325 MG/1; MG/1
TABLET ORAL
Qty: 90 TABLET | Refills: 0 | Status: SHIPPED | OUTPATIENT
Start: 2022-11-10

## 2022-11-10 NOTE — TELEPHONE ENCOUNTER
Patient called had requested a refill of Norco yesterday which was sent to [facility].  [facility] has told the patient they do not have any and not sure when delivery will come. She called the [facility] on [address] in [place]. Please sent script to the [facility]'s as they have it and amount thtat is needed.

## 2022-11-10 NOTE — TELEPHONE ENCOUNTER
Refill passed per [address] [place] protocol. Requested Prescriptions   Pending Prescriptions Disp Refills    AMLODIPINE BESY-BENAZEPRIL HCL 10-20 MG Oral Cap [Pharmacy Med Name: AMLODIPINE-BENAZEPRIL 10-20 MG] 90 capsule 0     Sig: TAKE 1 CAPSULE BY MOUTH EVERY DAY       Hypertensive Medications Protocol Passed - 11/10/2022 10:00 AM        Passed - In person appointment in the past 12 or next 3 months     Recent Outpatient Visits              1 month ago Sickle cell anemia with crisis ([facility] [address]    [address] Visit    1 month ago Abnormal radiograph    Neida Bruner MD    Office Visit    3 months ago MCI (mild cognitive impairment)    Arik Colunga DO    Office Visit    3 months ago Urinary problem    [address], [address] Neida Falcon MD    Office Visit    3 months ago Sickle cell disease, type sc, without crisis [facility])    [facility] AND [facility] Hematology Oncology Mathieu Horne MD    Office Visit          Future Appointments         Provider Department Appt Notes    In 2 weeks Megan Tsai RN [address]     In 2 months Stacey 25 Hematology Oncology LABS: CBC,retic    In 2 months Enrique Jacques 19 Hematology Oncology FOLLOW UP VISIT. CL  6M date per pt req               Passed - Last BP reading less than 140/90     BP Readings from Last 1 Encounters:  10/20/22 : 128/75              Passed - CMP or BMP in past 6 months     Recent Results (from the past 4392 hour(s))   Basic Metabolic Panel (8)    Collection Time: 08/26/22  1:05 AM   Result Value Ref Range    Glucose 101 (H) 70 - 99 mg/dL    Sodium 144 136 - 145 mmol/L    Potassium 4.2 3.5 - 5.1 mmol/L    Chloride 111 98 - 112 mmol/L    CO2 27.0 21.0 - 32.0 mmol/L    Anion Gap 6 0 - 18 mmol/L    BUN 9 7 - 18 mg/dL    Creatinine 0.87 0.55 - 1.02 mg/dL BUN/CREA Ratio 10.3 10.0 - 20.0    Calcium, Total 8.8 8.5 - 10.1 mg/dL    Calculated Osmolality 297 (H) 275 - 295 mOsm/kg    eGFR-Cr 76 >=60 mL/min/1.73m2     *Note: Due to a large number of results and/or encounters for the requested time period, some results have not been displayed. A complete set of results can be found in Results Review. Passed - In person appointment or virtual visit in the past 6 months     Recent Outpatient Visits              1 month ago Sickle cell anemia with crisis ([facility] [address]    [address] Visit    1 month ago Abnormal radiograph    [employer], [address] Veronique Vergara MD    Office Visit    3 months ago MCI (mild cognitive impairment)    HALLIE Yap DO    Office Visit    3 months ago Urinary problem    [employer], [address] Veronique Vergara MD    Office Visit    3 months ago Sickle cell disease, type sc, without crisis [facility] AND [facility] Hematology Oncology Олег Valdivia MD    Office Visit          Future Appointments         Provider Department Appt Notes    In 2 weeks Pedro Briscoe RN [employer], Shauna     In 2 months [address] 25 Hematology Oncology LABS: CBC,retic    In 2 months Enrique Le 19 Hematology Oncology FOLLOW UP VISIT. CL  6M date per pt req               Passed - EGFRCR or GFRAA > 50     GFR Evaluation  EGFRCR: 84 , resulted on 10/28/2022              Recent Outpatient Visits              1 month ago Sickle cell anemia with crisis ([facility] [address]    [address] Visit    1 month ago Abnormal radiograph    [employer], [address] Falcon SomersMarcel MD    Office Visit    3 months ago MCI (mild cognitive impairment)    HALLIE Yap DO    Office Visit    3 months ago Urinary problem    [place] Jose Luis Shetty MD    Office Visit    3 months ago Sickle cell disease, type sc, without crisis [facility])    [facility] AND [facility] Hematology Oncology Abigail Samuels MD    Office Visit           Future Appointments         Provider Department Appt Notes    In 2 weeks Gianfranco Castrejon RN [facility], Shauna     In 2 months Stacey 25 Hematology Oncology LABS: CBC,retic    In 2 months Enrique Goode 19 Hematology Oncology FOLLOW UP VISIT. CL  6M date per pt req

## 2022-11-22 ENCOUNTER — TELEPHONE (OUTPATIENT)
Dept: HEMATOLOGY/ONCOLOGY | Facility: HOSPITAL | Age: 62
End: 2022-11-22

## 2022-11-22 ENCOUNTER — OFFICE VISIT (OUTPATIENT)
Dept: HEMATOLOGY/ONCOLOGY | Facility: HOSPITAL | Age: 62
End: 2022-11-22
Attending: INTERNAL MEDICINE
Payer: COMMERCIAL

## 2022-11-22 VITALS
DIASTOLIC BLOOD PRESSURE: 66 MMHG | TEMPERATURE: 98 F | HEART RATE: 66 BPM | SYSTOLIC BLOOD PRESSURE: 133 MMHG | RESPIRATION RATE: 18 BRPM | OXYGEN SATURATION: 96 %

## 2022-11-22 DIAGNOSIS — D57.00 SICKLE CELL ANEMIA WITH CRISIS (HCC): Primary | ICD-10-CM

## 2022-11-22 PROCEDURE — 96360 HYDRATION IV INFUSION INIT: CPT

## 2022-11-22 NOTE — TELEPHONE ENCOUNTER
Patient called requesting hydration for Sickle Cell pain. Pain in legs, arms and hands. No sob or chest pain. Hydration apt set for 100 pm today in infusion. She verbalizes understanding.

## 2022-11-22 NOTE — PROGRESS NOTES
Pt to infusion for 1L hydration for sickle cell crisis. Pt c/o generalized joint pain 9/10. Pt noticed more pain since weather changed. Taking Norco PRN for pain management at home. PIV to right lateral forearm with good blood return noted. Pt tolerated 1L NS over 1 hour with ok from Minor Conn, APN to infusion over one hour. Pt states feeling better after one liter NS. PIV flushed and removed. Site covered with gauze and coban. Pt discharged stable and ambulatory.

## 2022-11-30 ENCOUNTER — ANTI-COAG VISIT (OUTPATIENT)
Dept: ANTICOAGULATION | Facility: CLINIC | Age: 62
End: 2022-11-30
Payer: COMMERCIAL

## 2022-11-30 DIAGNOSIS — D57.20 SICKLE CELL DISEASE, TYPE SC, WITHOUT CRISIS (HCC): ICD-10-CM

## 2022-11-30 DIAGNOSIS — Z79.01 LONG TERM CURRENT USE OF ANTICOAGULANT THERAPY: ICD-10-CM

## 2022-11-30 DIAGNOSIS — D57.00 HB-SS DISEASE WITH CRISIS (HCC): ICD-10-CM

## 2022-11-30 DIAGNOSIS — Z51.81 ENCOUNTER FOR THERAPEUTIC DRUG MONITORING: ICD-10-CM

## 2022-11-30 DIAGNOSIS — Z79.01 LONG TERM (CURRENT) USE OF ANTICOAGULANTS: ICD-10-CM

## 2022-11-30 LAB
INR: 2.8 (ref 0.8–1.2)
TEST STRIP EXPIRATION DATE: ABNORMAL DATE

## 2022-11-30 PROCEDURE — 93793 ANTICOAG MGMT PT WARFARIN: CPT | Performed by: FAMILY MEDICINE

## 2022-11-30 PROCEDURE — 85610 PROTHROMBIN TIME: CPT | Performed by: FAMILY MEDICINE

## 2022-12-19 DIAGNOSIS — G89.29 OTHER CHRONIC PAIN: ICD-10-CM

## 2022-12-19 DIAGNOSIS — D57.00 SICKLE CELL ANEMIA WITH CRISIS (HCC): ICD-10-CM

## 2022-12-19 DIAGNOSIS — Z79.891 LONG TERM (CURRENT) USE OF OPIATE ANALGESIC: ICD-10-CM

## 2022-12-19 RX ORDER — HYDROCODONE BITARTRATE AND ACETAMINOPHEN 10; 325 MG/1; MG/1
TABLET ORAL
Qty: 90 TABLET | Refills: 0 | Status: SHIPPED | OUTPATIENT
Start: 2022-12-19 | End: 2023-02-07

## 2022-12-28 ENCOUNTER — ANTI-COAG VISIT (OUTPATIENT)
Dept: ANTICOAGULATION | Facility: CLINIC | Age: 62
End: 2022-12-28
Payer: COMMERCIAL

## 2022-12-28 DIAGNOSIS — Z51.81 ENCOUNTER FOR THERAPEUTIC DRUG MONITORING: ICD-10-CM

## 2022-12-28 DIAGNOSIS — Z79.01 LONG TERM CURRENT USE OF ANTICOAGULANT THERAPY: ICD-10-CM

## 2022-12-28 DIAGNOSIS — D57.00 HB-SS DISEASE WITH CRISIS (HCC): ICD-10-CM

## 2022-12-28 DIAGNOSIS — D57.20 SICKLE CELL DISEASE, TYPE SC, WITHOUT CRISIS (HCC): ICD-10-CM

## 2022-12-28 DIAGNOSIS — Z79.01 LONG TERM (CURRENT) USE OF ANTICOAGULANTS: ICD-10-CM

## 2022-12-28 LAB
INR: 3 (ref 0.8–1.2)
TEST STRIP EXPIRATION DATE: ABNORMAL DATE

## 2022-12-28 PROCEDURE — 85610 PROTHROMBIN TIME: CPT | Performed by: FAMILY MEDICINE

## 2022-12-28 PROCEDURE — 93793 ANTICOAG MGMT PT WARFARIN: CPT | Performed by: FAMILY MEDICINE

## 2022-12-29 ENCOUNTER — OFFICE VISIT (OUTPATIENT)
Dept: HEMATOLOGY/ONCOLOGY | Facility: HOSPITAL | Age: 62
End: 2022-12-29
Attending: INTERNAL MEDICINE
Payer: COMMERCIAL

## 2022-12-29 ENCOUNTER — TELEPHONE (OUTPATIENT)
Dept: HEMATOLOGY/ONCOLOGY | Facility: HOSPITAL | Age: 62
End: 2022-12-29

## 2022-12-29 VITALS
RESPIRATION RATE: 18 BRPM | HEART RATE: 68 BPM | SYSTOLIC BLOOD PRESSURE: 123 MMHG | OXYGEN SATURATION: 95 % | DIASTOLIC BLOOD PRESSURE: 60 MMHG | TEMPERATURE: 98 F

## 2022-12-29 DIAGNOSIS — D57.00 SICKLE CELL ANEMIA WITH CRISIS (HCC): Primary | ICD-10-CM

## 2022-12-29 PROCEDURE — 96360 HYDRATION IV INFUSION INIT: CPT

## 2022-12-29 PROCEDURE — 96361 HYDRATE IV INFUSION ADD-ON: CPT

## 2022-12-29 NOTE — TELEPHONE ENCOUNTER
Returned call to patient. She is having SS pain in shoulders, joints and lower back and requesting hydration. Spoke to infusion and patient will come at 1100 am for hydration. She verbalizes understanding.

## 2022-12-29 NOTE — TELEPHONE ENCOUNTER
Patient Alisa Staton  wants to come in today or tomorrow for fluids  Pain level  from a 1 to 10 says she's at a 9 says she has sickle cell. Please let her know if she come in today.

## 2022-12-29 NOTE — PROGRESS NOTES
Pt here for IV hydration after requesting an appointment this morning. H/O SCD. Pt reports pain to shoulders x4 days and using norco 10/325 for pain management as directed. Pt states she is hydrating well at home. Pt also mentions that she has a boil to right shoulder for the last 3 days. Upon assessment, area noted to be quarter size with slight erythema. Pt states she has been using warm compresses throughout each day. Able to express small amount from area today while in infusion chair. LEVI Bright notified and would like pt to contact PCP if site does not improve. Pt aware and verbalized understanding. Pt completed liter without issue and felt well to go home; pt discharged by Tani Dodson RN.

## 2023-01-06 DIAGNOSIS — D57.00 SICKLE CELL ANEMIA WITH CRISIS (HCC): Primary | ICD-10-CM

## 2023-01-06 DIAGNOSIS — D57.20 SICKLE CELL DISEASE, TYPE SC, WITHOUT CRISIS (HCC): ICD-10-CM

## 2023-01-06 DIAGNOSIS — Z51.81 ENCOUNTER FOR MEDICATION MONITORING: ICD-10-CM

## 2023-01-09 ENCOUNTER — OFFICE VISIT (OUTPATIENT)
Dept: HEMATOLOGY/ONCOLOGY | Facility: HOSPITAL | Age: 63
End: 2023-01-09
Attending: INTERNAL MEDICINE
Payer: COMMERCIAL

## 2023-01-09 VITALS
RESPIRATION RATE: 18 BRPM | DIASTOLIC BLOOD PRESSURE: 69 MMHG | WEIGHT: 203.81 LBS | OXYGEN SATURATION: 95 % | TEMPERATURE: 99 F | HEART RATE: 74 BPM | BODY MASS INDEX: 36.57 KG/M2 | SYSTOLIC BLOOD PRESSURE: 119 MMHG | HEIGHT: 62.5 IN

## 2023-01-09 DIAGNOSIS — D57.20 SICKLE CELL DISEASE, TYPE SC, WITHOUT CRISIS (HCC): ICD-10-CM

## 2023-01-09 DIAGNOSIS — D57.20 SICKLE CELL DISEASE, TYPE SC, WITHOUT CRISIS (HCC): Primary | ICD-10-CM

## 2023-01-09 DIAGNOSIS — Z79.891 ENCOUNTER FOR MONITORING OPIOID MAINTENANCE THERAPY: ICD-10-CM

## 2023-01-09 DIAGNOSIS — Z51.81 ENCOUNTER FOR MONITORING OPIOID MAINTENANCE THERAPY: ICD-10-CM

## 2023-01-09 LAB
BASOPHILS # BLD AUTO: 0.06 X10(3) UL (ref 0–0.2)
BASOPHILS NFR BLD AUTO: 0.7 %
DEPRECATED RDW RBC AUTO: 47.7 FL (ref 35.1–46.3)
EOSINOPHIL # BLD AUTO: 0.22 X10(3) UL (ref 0–0.7)
EOSINOPHIL NFR BLD AUTO: 2.5 %
ERYTHROCYTE [DISTWIDTH] IN BLOOD BY AUTOMATED COUNT: 15.5 % (ref 11–15)
HCT VFR BLD AUTO: 32.3 %
HGB BLD-MCNC: 11.6 G/DL
HGB RETIC QN AUTO: 33.1 PG (ref 28.2–36.6)
IMM GRANULOCYTES # BLD AUTO: 0.02 X10(3) UL (ref 0–1)
IMM GRANULOCYTES NFR BLD: 0.2 %
IMM RETICS NFR: 0.26 RATIO (ref 0.1–0.3)
LYMPHOCYTES # BLD AUTO: 2.89 X10(3) UL (ref 1–4)
LYMPHOCYTES NFR BLD AUTO: 33.5 %
MCH RBC QN AUTO: 30.9 PG (ref 26–34)
MCHC RBC AUTO-ENTMCNC: 35.9 G/DL (ref 31–37)
MCV RBC AUTO: 85.9 FL
MONOCYTES # BLD AUTO: 1.09 X10(3) UL (ref 0.1–1)
MONOCYTES NFR BLD AUTO: 12.6 %
NEUTROPHILS # BLD AUTO: 4.35 X10 (3) UL (ref 1.5–7.7)
NEUTROPHILS # BLD AUTO: 4.35 X10(3) UL (ref 1.5–7.7)
NEUTROPHILS NFR BLD AUTO: 50.5 %
PLATELET # BLD AUTO: 353 10(3)UL (ref 150–450)
PLATELET MORPHOLOGY: NORMAL
RBC # BLD AUTO: 3.76 X10(6)UL
RETICS # AUTO: 154.9 X10(3) UL (ref 22.5–147.5)
RETICS/RBC NFR AUTO: 4.1 %
WBC # BLD AUTO: 8.6 X10(3) UL (ref 4–11)

## 2023-01-09 PROCEDURE — 85025 COMPLETE CBC W/AUTO DIFF WBC: CPT

## 2023-01-09 PROCEDURE — 36415 COLL VENOUS BLD VENIPUNCTURE: CPT

## 2023-01-09 PROCEDURE — 85045 AUTOMATED RETICULOCYTE COUNT: CPT

## 2023-01-09 PROCEDURE — 99211 OFF/OP EST MAY X REQ PHY/QHP: CPT

## 2023-01-25 ENCOUNTER — ANTI-COAG VISIT (OUTPATIENT)
Dept: ANTICOAGULATION | Facility: CLINIC | Age: 63
End: 2023-01-25

## 2023-01-25 DIAGNOSIS — D57.20 SICKLE CELL DISEASE, TYPE SC, WITHOUT CRISIS (HCC): ICD-10-CM

## 2023-01-25 DIAGNOSIS — Z51.81 ENCOUNTER FOR THERAPEUTIC DRUG MONITORING: ICD-10-CM

## 2023-01-25 DIAGNOSIS — Z79.01 LONG TERM CURRENT USE OF ANTICOAGULANT THERAPY: ICD-10-CM

## 2023-01-25 DIAGNOSIS — Z79.01 LONG TERM (CURRENT) USE OF ANTICOAGULANTS: ICD-10-CM

## 2023-01-25 DIAGNOSIS — D57.00 HB-SS DISEASE WITH CRISIS (HCC): ICD-10-CM

## 2023-01-25 LAB
INR: 2.3 (ref 0.8–1.2)
TEST STRIP EXPIRATION DATE: ABNORMAL DATE

## 2023-01-25 PROCEDURE — 85610 PROTHROMBIN TIME: CPT | Performed by: FAMILY MEDICINE

## 2023-01-25 PROCEDURE — 93793 ANTICOAG MGMT PT WARFARIN: CPT | Performed by: FAMILY MEDICINE

## 2023-02-07 ENCOUNTER — TELEPHONE (OUTPATIENT)
Dept: HEMATOLOGY/ONCOLOGY | Facility: HOSPITAL | Age: 63
End: 2023-02-07

## 2023-02-07 DIAGNOSIS — D57.00 SICKLE CELL ANEMIA WITH CRISIS (HCC): ICD-10-CM

## 2023-02-07 DIAGNOSIS — G89.29 OTHER CHRONIC PAIN: ICD-10-CM

## 2023-02-07 DIAGNOSIS — Z79.891 LONG TERM (CURRENT) USE OF OPIATE ANALGESIC: ICD-10-CM

## 2023-02-07 RX ORDER — HYDROCODONE BITARTRATE AND ACETAMINOPHEN 10; 325 MG/1; MG/1
TABLET ORAL
Qty: 90 TABLET | Refills: 0 | Status: SHIPPED | OUTPATIENT
Start: 2023-02-07 | End: 2023-02-09

## 2023-02-07 NOTE — TELEPHONE ENCOUNTER
Patient had called Pharmacy  To get refill on medication  HYDROcodone-acetaminophen  MG Oral Tab    Was advised there are not any refills remaining.     Please advise

## 2023-02-09 ENCOUNTER — TELEPHONE (OUTPATIENT)
Dept: HEMATOLOGY/ONCOLOGY | Facility: HOSPITAL | Age: 63
End: 2023-02-09

## 2023-02-09 DIAGNOSIS — D57.00 SICKLE CELL ANEMIA WITH CRISIS (HCC): ICD-10-CM

## 2023-02-09 DIAGNOSIS — Z79.891 LONG TERM (CURRENT) USE OF OPIATE ANALGESIC: ICD-10-CM

## 2023-02-09 DIAGNOSIS — G89.29 OTHER CHRONIC PAIN: ICD-10-CM

## 2023-02-09 RX ORDER — HYDROCODONE BITARTRATE AND ACETAMINOPHEN 10; 325 MG/1; MG/1
TABLET ORAL
Qty: 90 TABLET | Refills: 0 | Status: SHIPPED | OUTPATIENT
Start: 2023-02-09

## 2023-02-09 NOTE — TELEPHONE ENCOUNTER
Called pt to clarify - Hermann on [address] [facility] is [place]. Rx sent. No further questions. [General Appearance - Alert] : alert [General Appearance - In No Acute Distress] : in no acute distress [General Appearance - Well Nourished] : well nourished [Respiration, Rhythm And Depth] : normal respiratory rhythm and effort [Exaggerated Use Of Accessory Muscles For Inspiration] : no accessory muscle use [Auscultation Breath Sounds / Voice Sounds] : lungs were clear to auscultation bilaterally [Heart Sounds] : normal S1 and S2 [Bowel Sounds] : normal bowel sounds [Abdomen Soft] : soft [Abdomen Tenderness] : non-tender [Cervical Lymph Nodes Enlarged Posterior Bilaterally] : posterior cervical [] : no hepato-splenomegaly [Cervical Lymph Nodes Enlarged Anterior Bilaterally] : anterior cervical [Supraclavicular Lymph Nodes Enlarged Bilaterally] : supraclavicular [Abnormal Walk] : normal gait [Skin Color & Pigmentation] : normal skin color and pigmentation [Oriented To Time, Place, And Person] : oriented to person, place, and time [Affect] : the affect was normal

## 2023-02-09 NOTE — TELEPHONE ENCOUNTER
Patient called she had requesting refill of Norco 2/7/23, it was sent to [facility], they however do not have a supply and not sure when getting. Please send a refill order to [facility] in [facility] on [address] as they have [address].  Needs asap.

## 2023-02-10 RX ORDER — WARFARIN SODIUM 5 MG/1
TABLET ORAL NIGHTLY
Qty: 120 TABLET | Refills: 1 | Status: SHIPPED | OUTPATIENT
Start: 2023-02-10

## 2023-02-10 NOTE — TELEPHONE ENCOUNTER
Refill passed per [facility] protocol. Requested Prescriptions   Pending Prescriptions Disp Refills    WARFARIN 5 MG Oral Tab [Pharmacy Med Name: WARFARIN SODIUM 5 MG TABLET] 90 tablet 1     Sig: TAKE 1 TABLET BY MOUTH EVERY DAY       Rx Em Warfarin Protocol Passed - 2/6/2023  8:12 PM        Passed - Appointment in past 12 or next 3 months     Recent Outpatient Visits              1 month ago Sickle cell disease, type sc, without crisis [facility])    [facility] Hematology Oncology    Nurse Only    1 month ago Sickle cell disease, type sc, without crisis [facility])    [facility] Hematology Oncology Sree Riley MD    Office Visit    1 month ago Sickle cell anemia with crisis ([address]    [address] Visit    2 months ago Sickle cell anemia with crisis ([address]    [address] Visit    4 months ago Sickle cell anemia with crisis ([address]    [address] Visit          Future Appointments         Provider Department Appt Notes    In 1 week PING Pressley Ashleyberg     In 5 months Stacey 25 Hematology Oncology LABS: CBC,retic    In 5 months Enrique Mercado 19 Hematology Oncology FOLLOW UP VISIT. CL  6M               Passed - INR 3.9 or less past 6 weeks     INR   Date Value Ref Range Status   01/25/2023 2.3 (A) 0.8 - 1.2 Final                   Passed - CMP within past 12 months     No results found for this or any previous visit (from the past 4380 hour(s)).                 Passed - AST < 111 (less than 3 Xs the upper limit)     Lab Results   Component Value Date    AST 33 02/18/2022                     Passed - ALT < 168 (less than 3Xs the upper limit)     Lab Results   Component Value Date    ALT 28 02/18/2022                     Passed - CBC within the past 12 months     Recent Results (from the past 8760 hour(s))   CBC W/ DIFFERENTIAL    Collection Time: 01/09/23 12:30 PM   Result Value Ref Range    WBC 8.6 4.0 - 11.0 x10(3) uL    RBC 3.76 (L) 3.80 - 5.30 x10(6)uL    HGB 11.6 (L) 12.0 - 16.0 g/dL    HCT 32.3 (L) 35.0 - 48.0 %    MCV 85.9 80.0 - 100.0 fL    MCH 30.9 26.0 - 34.0 pg    MCHC 35.9 31.0 - 37.0 g/dL    RDW-SD 47.7 (H) 35.1 - 46.3 fL    RDW 15.5 (H) 11.0 - 15.0 %    .0 150.0 - 450.0 10(3)uL    Neutrophil Absolute Prelim 4.35 1.50 - 7.70 x10 (3) uL    Neutrophil Absolute 4.35 1.50 - 7.70 x10(3) uL    Lymphocyte Absolute 2.89 1.00 - 4.00 x10(3) uL    Monocyte Absolute 1.09 (H) 0.10 - 1.00 x10(3) uL    Eosinophil Absolute 0.22 0.00 - 0.70 x10(3) uL    Basophil Absolute 0.06 0.00 - 0.20 x10(3) uL    Immature Granulocyte Absolute 0.02 0.00 - 1.00 x10(3) uL    Neutrophil % 50.5 %    Lymphocyte % 33.5 %    Monocyte % 12.6 %    Eosinophil % 2.5 %    Basophil % 0.7 %    Immature Granulocyte % 0.2 %     *Note: Due to a large number of results and/or encounters for the requested time period, some results have not been displayed. A complete set of results can be found in Results Review. Passed - Hgb >/= 10g/dl within past 12 months     HGB   Date Value Ref Range Status   01/09/2023 11.6 (L) 12.0 - 16.0 g/dL Final                   Passed - Platelets >/= 212 past 12 months     PLT   Date Value Ref Range Status   01/09/2023 353.0 150.0 - 450.0 10(3)uL Final                     OXYBUTYNIN 5 MG Oral Tab [Pharmacy Med Name: OXYBUTYNIN 5 MG TABLET] 90 tablet 1     Sig: Take 1 tablet (5 mg total) by mouth daily.  Along with 15 mg       Genitourinary Medications Passed - 2/6/2023  8:12 PM        Passed - Patient does not have pulmonary hypertension on problem list        Passed - In person appointment or virtual visit in the past 12 mos or appointment in next 3 mos     Recent Outpatient Visits              1 month ago Sickle cell disease, type sc, without crisis [facility])    [facility] AND [facility] Hematology Oncology    Nurse Only    1 month ago Sickle cell disease, type sc, without crisis [facility])    [facility] AND [facility] Hematology Oncology Felicia Corral MD    Office Visit    1 month ago Sickle cell anemia with crisis ([address]    [address] Visit    2 months ago Sickle cell anemia with crisis ([address]    [address] Visit    4 months ago Sickle cell anemia with crisis ([facility] [address]    [address] Visit          Future Appointments         Provider Department Appt Notes    In 1 week Mary Jimenez RN [employer],      In 5 months St[address] 25 Hematology Oncology LABS: CBC,retic    In 5 months Enrique Parker 19 Hematology Oncology FOLLOW UP VISIT. CL  6M

## 2023-02-15 RX ORDER — OXYBUTYNIN CHLORIDE 5 MG/1
5 TABLET ORAL DAILY
Qty: 90 TABLET | Refills: 1 | Status: SHIPPED | OUTPATIENT
Start: 2023-02-15

## 2023-02-22 ENCOUNTER — ANTI-COAG VISIT (OUTPATIENT)
Dept: ANTICOAGULATION | Facility: CLINIC | Age: 63
End: 2023-02-22

## 2023-02-22 DIAGNOSIS — D57.20 SICKLE CELL DISEASE, TYPE SC, WITHOUT CRISIS (HCC): ICD-10-CM

## 2023-02-22 DIAGNOSIS — D57.00 HB-SS DISEASE WITH CRISIS (HCC): ICD-10-CM

## 2023-02-22 DIAGNOSIS — Z51.81 ENCOUNTER FOR THERAPEUTIC DRUG MONITORING: ICD-10-CM

## 2023-02-22 DIAGNOSIS — Z79.01 LONG TERM (CURRENT) USE OF ANTICOAGULANTS: ICD-10-CM

## 2023-02-22 DIAGNOSIS — Z79.01 LONG TERM CURRENT USE OF ANTICOAGULANT THERAPY: ICD-10-CM

## 2023-02-22 LAB
INR: 3.9 (ref 0.8–1.2)
TEST STRIP EXPIRATION DATE: ABNORMAL DATE

## 2023-02-22 PROCEDURE — 93793 ANTICOAG MGMT PT WARFARIN: CPT | Performed by: FAMILY MEDICINE

## 2023-02-22 PROCEDURE — 85610 PROTHROMBIN TIME: CPT | Performed by: FAMILY MEDICINE

## 2023-03-02 ENCOUNTER — TELEPHONE (OUTPATIENT)
Facility: CLINIC | Age: 63
End: 2023-03-02

## 2023-03-02 NOTE — TELEPHONE ENCOUNTER
I apologized to the patient. I let her know that the PT/INR was drawn on arrival to her procedure and she does not need to go to the lab to get this done.

## 2023-03-03 ENCOUNTER — TELEPHONE (OUTPATIENT)
Dept: FAMILY MEDICINE CLINIC | Facility: CLINIC | Age: 63
End: 2023-03-03

## 2023-03-03 NOTE — TELEPHONE ENCOUNTER
, Patient states she us supposed to be taking oxybutynin chloride ER 15 mg along with the 5mg tablet for a total of 20 mg daily. Patient states she never reported not taking the medication. Patient states the 5 mg alone does not help. Patient asking for refill.

## 2023-03-06 RX ORDER — OXYBUTYNIN CHLORIDE 15 MG/1
15 TABLET, EXTENDED RELEASE ORAL DAILY
Qty: 90 TABLET | Refills: 1 | Status: SHIPPED | OUTPATIENT
Start: 2023-03-06

## 2023-03-06 NOTE — TELEPHONE ENCOUNTER
Dr Jameson Coppola, please advise  Sent to Franci Duncan at office. Message originated on 3/3/23    See messages below  Order pended for approval/review.      Future Appointments   Date Time Provider Rebeka Black   3/8/2023  8:40 AM Bandar Rutherford MD [facility]   3/24/2023  9:45 AM Lorena Bunn RN [facility]   7/12/2023 10:30 AM CMA [facility] HEM ONC EMO   7/12/2023 11:30 AM Davonte Baca MD [facility] HEM ONC EMO

## 2023-03-06 NOTE — TELEPHONE ENCOUNTER
Patient waiting still for the Rx:  oxybutynin 15 MG Oral Tab    [facility] [address] [place], [place] - [id number],

## 2023-03-10 ENCOUNTER — OFFICE VISIT (OUTPATIENT)
Dept: FAMILY MEDICINE CLINIC | Facility: CLINIC | Age: 63
End: 2023-03-10

## 2023-03-10 VITALS
HEART RATE: 72 BPM | SYSTOLIC BLOOD PRESSURE: 142 MMHG | BODY MASS INDEX: 36.78 KG/M2 | WEIGHT: 205 LBS | RESPIRATION RATE: 18 BRPM | DIASTOLIC BLOOD PRESSURE: 66 MMHG | HEIGHT: 62.5 IN | OXYGEN SATURATION: 92 %

## 2023-03-10 DIAGNOSIS — R10.9 ABDOMINAL WALL PAIN: ICD-10-CM

## 2023-03-10 DIAGNOSIS — R41.3 MEMORY DEFICIT: Primary | ICD-10-CM

## 2023-03-10 PROCEDURE — 3078F DIAST BP <80 MM HG: CPT | Performed by: FAMILY MEDICINE

## 2023-03-10 PROCEDURE — 3077F SYST BP >= 140 MM HG: CPT | Performed by: FAMILY MEDICINE

## 2023-03-10 PROCEDURE — 3008F BODY MASS INDEX DOCD: CPT | Performed by: FAMILY MEDICINE

## 2023-03-10 PROCEDURE — 99214 OFFICE O/P EST MOD 30 MIN: CPT | Performed by: FAMILY MEDICINE

## 2023-03-14 RX ORDER — AMLODIPINE BESYLATE AND BENAZEPRIL HYDROCHLORIDE 10; 20 MG/1; MG/1
CAPSULE ORAL
Qty: 90 CAPSULE | Refills: 1 | Status: SHIPPED | OUTPATIENT
Start: 2023-03-14

## 2023-03-24 ENCOUNTER — ANTI-COAG VISIT (OUTPATIENT)
Dept: ANTICOAGULATION | Facility: CLINIC | Age: 63
End: 2023-03-24

## 2023-03-24 DIAGNOSIS — Z79.01 LONG TERM CURRENT USE OF ANTICOAGULANT THERAPY: ICD-10-CM

## 2023-03-24 DIAGNOSIS — D57.00 HB-SS DISEASE WITH CRISIS (HCC): ICD-10-CM

## 2023-03-24 DIAGNOSIS — Z79.01 LONG TERM (CURRENT) USE OF ANTICOAGULANTS: ICD-10-CM

## 2023-03-24 DIAGNOSIS — D57.20 SICKLE CELL DISEASE, TYPE SC, WITHOUT CRISIS (HCC): ICD-10-CM

## 2023-03-24 DIAGNOSIS — Z51.81 ENCOUNTER FOR THERAPEUTIC DRUG MONITORING: ICD-10-CM

## 2023-03-24 LAB — INR: 2.8 (ref 2.5–3.5)

## 2023-03-24 PROCEDURE — 93793 ANTICOAG MGMT PT WARFARIN: CPT | Performed by: FAMILY MEDICINE

## 2023-03-24 PROCEDURE — 85610 PROTHROMBIN TIME: CPT | Performed by: FAMILY MEDICINE

## 2023-03-24 NOTE — PROGRESS NOTES
Patient in for face to face visit. INR within goal range. Confirmed daily dose with patient. Advised per protocol continue current dose, recheck INR 4 weeks.

## 2023-04-10 ENCOUNTER — OFFICE VISIT (OUTPATIENT)
Dept: HEMATOLOGY/ONCOLOGY | Facility: HOSPITAL | Age: 63
End: 2023-04-10
Attending: INTERNAL MEDICINE
Payer: COMMERCIAL

## 2023-04-10 ENCOUNTER — TELEPHONE (OUTPATIENT)
Dept: HEMATOLOGY/ONCOLOGY | Facility: HOSPITAL | Age: 63
End: 2023-04-10

## 2023-04-10 VITALS
HEART RATE: 95 BPM | DIASTOLIC BLOOD PRESSURE: 69 MMHG | SYSTOLIC BLOOD PRESSURE: 125 MMHG | RESPIRATION RATE: 18 BRPM | OXYGEN SATURATION: 95 % | TEMPERATURE: 98 F

## 2023-04-10 DIAGNOSIS — D57.00 SICKLE CELL ANEMIA WITH CRISIS (HCC): Primary | ICD-10-CM

## 2023-04-10 PROCEDURE — 96360 HYDRATION IV INFUSION INIT: CPT

## 2023-04-10 PROCEDURE — 96361 HYDRATE IV INFUSION ADD-ON: CPT

## 2023-04-10 NOTE — PROGRESS NOTES
Pt here for IV hydration due to increased pain from sickle cell disease. Ordering MD: CHRISTO Sanchez     Pt tolerated infusion without difficulty or complaint. Patient instructed to call office if she feels she needs additional IVF. Patient verbalized understanding.        Education Record    Learner:  Patient    Disease / Diagnosis: Sickle Cell    Barriers / Limitations:  None   Comments:    Method:  Discussion   Comments:    General Topics:  Plan of care reviewed   Comments:    Outcome:  Shows understanding   Comments:

## 2023-04-10 NOTE — TELEPHONE ENCOUNTER
Sickle Cell    Having pain in left hip and leg. Requesting hydration. No other complaints per patient. Infusion apt for fluids at 1100 am today. She verbalizes understanding.

## 2023-04-11 ENCOUNTER — OFFICE VISIT (OUTPATIENT)
Dept: HEMATOLOGY/ONCOLOGY | Facility: HOSPITAL | Age: 63
End: 2023-04-11
Attending: INTERNAL MEDICINE
Payer: COMMERCIAL

## 2023-04-11 VITALS
OXYGEN SATURATION: 92 % | DIASTOLIC BLOOD PRESSURE: 65 MMHG | SYSTOLIC BLOOD PRESSURE: 149 MMHG | TEMPERATURE: 100 F | HEART RATE: 110 BPM | RESPIRATION RATE: 20 BRPM

## 2023-04-11 DIAGNOSIS — D57.00 SICKLE CELL ANEMIA WITH CRISIS (HCC): Primary | ICD-10-CM

## 2023-04-11 PROCEDURE — 96361 HYDRATE IV INFUSION ADD-ON: CPT

## 2023-04-11 PROCEDURE — 96360 HYDRATION IV INFUSION INIT: CPT

## 2023-04-11 NOTE — PROGRESS NOTES
Pt here for IV hydration. Pt complaining of increased pain that's manageable with her oral pain meds. Patient with low grade fever today. Patient stated she has not had fevers at home but she just came from outside and the temperature is high today. Patient denies all flu/covid symptoms. Patient instructed to follow up with PCP if fevers continue or worsens. Patient verbalized understanding. Ordering MD: CHRISTO Hawkins       Pt tolerated infusion without difficulty or complaint. Reviewed next apt date/time.       Education Record    Learner:  Patient    Disease / Jun Whalen cell    Barriers / Limitations:  None   Comments:    Method:  Discussion   Comments:    General Topics:  Plan of care reviewed   Comments:    Outcome:  Shows understanding   Comments:

## 2023-04-20 ENCOUNTER — ANTI-COAG VISIT (OUTPATIENT)
Dept: ANTICOAGULATION | Facility: CLINIC | Age: 63
End: 2023-04-20

## 2023-04-20 DIAGNOSIS — Z51.81 ENCOUNTER FOR THERAPEUTIC DRUG MONITORING: ICD-10-CM

## 2023-04-20 DIAGNOSIS — D57.20 SICKLE CELL DISEASE, TYPE SC, WITHOUT CRISIS (HCC): ICD-10-CM

## 2023-04-20 DIAGNOSIS — Z79.01 LONG TERM (CURRENT) USE OF ANTICOAGULANTS: ICD-10-CM

## 2023-04-20 DIAGNOSIS — Z79.01 LONG TERM CURRENT USE OF ANTICOAGULANT THERAPY: ICD-10-CM

## 2023-04-20 DIAGNOSIS — D57.00 HB-SS DISEASE WITH CRISIS (HCC): ICD-10-CM

## 2023-04-20 LAB — INR: 3.4 (ref 2.5–3.5)

## 2023-04-20 PROCEDURE — 93793 ANTICOAG MGMT PT WARFARIN: CPT | Performed by: FAMILY MEDICINE

## 2023-04-20 PROCEDURE — 85610 PROTHROMBIN TIME: CPT | Performed by: FAMILY MEDICINE

## 2023-04-20 NOTE — PROGRESS NOTES
Patient in clinic for visit. Denies changes with medications/diet. Confirmed daily dose with patient. INR within goal range. Advised, per protocol, continue current dose and recheck INR in 4 weeks.     7.5 mg every Sun, Wed, Sat; 5 mg all other days

## 2023-04-24 RX ORDER — FOLIC ACID 1 MG/1
1 TABLET ORAL DAILY
Qty: 90 TABLET | Refills: 0 | Status: SHIPPED | OUTPATIENT
Start: 2023-04-24

## 2023-04-24 NOTE — TELEPHONE ENCOUNTER
Refill passed per [facility] protocol.  Sending remaining refill to new pharmacy     Requested Prescriptions   Pending Prescriptions Disp Refills    FOLIC ACID 1 MG Oral Tab [Pharmacy Med Name: FOLIC ACID 1 MG TABLET] 90 tablet 3     Sig: TAKE 1 TABLET BY MOUTH EVERY DAY       There is no refill protocol information for this order

## 2023-05-12 ENCOUNTER — TELEPHONE (OUTPATIENT)
Dept: FAMILY MEDICINE CLINIC | Facility: CLINIC | Age: 63
End: 2023-05-12

## 2023-05-12 DIAGNOSIS — G89.29 OTHER CHRONIC PAIN: ICD-10-CM

## 2023-05-12 DIAGNOSIS — Z79.891 LONG TERM (CURRENT) USE OF OPIATE ANALGESIC: ICD-10-CM

## 2023-05-12 DIAGNOSIS — D57.00 SICKLE CELL ANEMIA WITH CRISIS (HCC): ICD-10-CM

## 2023-05-12 RX ORDER — HYDROCODONE BITARTRATE AND ACETAMINOPHEN 10; 325 MG/1; MG/1
TABLET ORAL
Qty: 90 TABLET | Refills: 0 | Status: CANCELLED | OUTPATIENT
Start: 2023-05-12

## 2023-05-12 RX ORDER — HYDROCODONE BITARTRATE AND ACETAMINOPHEN 10; 325 MG/1; MG/1
TABLET ORAL
Qty: 90 TABLET | Refills: 0 | Status: SHIPPED | OUTPATIENT
Start: 2023-05-12 | End: 2023-07-12

## 2023-05-30 ENCOUNTER — TELEPHONE (OUTPATIENT)
Dept: ANTICOAGULATION | Facility: CLINIC | Age: 63
End: 2023-05-30

## 2023-05-30 DIAGNOSIS — D57.00 HB-SS DISEASE WITH CRISIS (HCC): ICD-10-CM

## 2023-05-30 DIAGNOSIS — Z79.01 MONITORING FOR LONG-TERM ANTICOAGULANT USE: ICD-10-CM

## 2023-05-30 DIAGNOSIS — Z51.81 MONITORING FOR LONG-TERM ANTICOAGULANT USE: ICD-10-CM

## 2023-05-30 DIAGNOSIS — D57.20 SICKLE CELL DISEASE, TYPE SC, WITHOUT CRISIS (HCC): Primary | ICD-10-CM

## 2023-05-30 NOTE — TELEPHONE ENCOUNTER
Anticoag referral expires soon. Order pended. Please sign, thank you.               Dx: Sickle cell disease, type sc, without crisis ([facility] [address] (D57.20)  Hb-SS disease with crisis ([facility] [address] (D57.00)  Long term (current) use of anticoagulants (Z79.01)  Encounter for therapeutic drug monitoring (Z51.81) 3

## 2023-05-31 PROBLEM — Z79.01 MONITORING FOR LONG-TERM ANTICOAGULANT USE: Status: ACTIVE | Noted: 2023-05-31

## 2023-05-31 PROBLEM — Z51.81 MONITORING FOR LONG-TERM ANTICOAGULANT USE: Status: ACTIVE | Noted: 2023-05-31

## 2023-06-01 ENCOUNTER — ANTI-COAG VISIT (OUTPATIENT)
Dept: ANTICOAGULATION | Facility: CLINIC | Age: 63
End: 2023-06-01

## 2023-06-01 DIAGNOSIS — Z51.81 MONITORING FOR LONG-TERM ANTICOAGULANT USE: ICD-10-CM

## 2023-06-01 DIAGNOSIS — D57.20 SICKLE CELL DISEASE, TYPE SC, WITHOUT CRISIS (HCC): ICD-10-CM

## 2023-06-01 DIAGNOSIS — Z79.01 LONG TERM (CURRENT) USE OF ANTICOAGULANTS: ICD-10-CM

## 2023-06-01 DIAGNOSIS — Z51.81 ENCOUNTER FOR THERAPEUTIC DRUG MONITORING: ICD-10-CM

## 2023-06-01 DIAGNOSIS — Z79.01 MONITORING FOR LONG-TERM ANTICOAGULANT USE: ICD-10-CM

## 2023-06-01 DIAGNOSIS — D57.00 HB-SS DISEASE WITH CRISIS (HCC): ICD-10-CM

## 2023-06-01 LAB — INR: 2.6 (ref 2.5–3.5)

## 2023-06-01 PROCEDURE — 85610 PROTHROMBIN TIME: CPT | Performed by: FAMILY MEDICINE

## 2023-06-01 PROCEDURE — 93793 ANTICOAG MGMT PT WARFARIN: CPT | Performed by: FAMILY MEDICINE

## 2023-06-01 NOTE — PROGRESS NOTES
Patient in clinic for INR check. Denied changes with medications/diet. Will travel this weekend out of state for 1 week. INR within goal range. Confirmed daily dose. Advised per protocol, continue current dose and recheck INR 4-6 weeks.     7.5 mg every Sun, Wed, Sat; 5 mg all other days

## 2023-06-12 ENCOUNTER — MED REC SCAN ONLY (OUTPATIENT)
Dept: FAMILY MEDICINE CLINIC | Facility: CLINIC | Age: 63
End: 2023-06-12

## 2023-06-30 ENCOUNTER — OFFICE VISIT (OUTPATIENT)
Dept: HEMATOLOGY/ONCOLOGY | Facility: HOSPITAL | Age: 63
End: 2023-06-30
Attending: INTERNAL MEDICINE
Payer: COMMERCIAL

## 2023-06-30 ENCOUNTER — TELEPHONE (OUTPATIENT)
Dept: HEMATOLOGY/ONCOLOGY | Facility: HOSPITAL | Age: 63
End: 2023-06-30

## 2023-06-30 VITALS
RESPIRATION RATE: 18 BRPM | TEMPERATURE: 98 F | SYSTOLIC BLOOD PRESSURE: 129 MMHG | HEART RATE: 74 BPM | OXYGEN SATURATION: 95 % | DIASTOLIC BLOOD PRESSURE: 69 MMHG

## 2023-06-30 DIAGNOSIS — D57.00 SICKLE CELL ANEMIA WITH CRISIS (HCC): Primary | ICD-10-CM

## 2023-06-30 PROCEDURE — 96360 HYDRATION IV INFUSION INIT: CPT

## 2023-06-30 PROCEDURE — 96361 HYDRATE IV INFUSION ADD-ON: CPT

## 2023-07-06 RX ORDER — FLUTICASONE PROPIONATE 50 MCG
2 SPRAY, SUSPENSION (ML) NASAL DAILY
Qty: 48 ML | Refills: 3 | Status: SHIPPED | OUTPATIENT
Start: 2023-07-06

## 2023-07-06 NOTE — TELEPHONE ENCOUNTER
Refill passed per [place] [facility] protocol. Requested Prescriptions   Pending Prescriptions Disp Refills    FLUTICASONE PROPIONATE 50 MCG/ACT Nasal Suspension [Pharmacy Med Name: FLUTICASONE PROP 50 MCG SPRAY] 48 mL 0     Sig: SPRAY 2 SPRAYS BY NASAL ROUTE DAILY       Allergy Medication Protocol Passed - 7/6/2023 12:32 AM        Passed - In person appointment or virtual visit in the past 12 mos or appointment in next 3 mos     Recent Outpatient Visits              6 days ago Sickle cell anemia with crisis ([address]    [address] Visit    2 months ago Sickle cell anemia with crisis ([address]    [address] Visit    2 months ago Sickle cell anemia with crisis ([address]    [address] Visit    3 months ago Memory deficit    Brody Vyas MD    Office Visit    5 months ago Sickle cell disease, type sc, without crisis [facility])    [facility] AND [facility] Hematology Oncology    Nurse Only          Future Appointments         Provider Department Appt Notes    In 6 days Stacey 25 Hematology Oncology LABS: CBC,retic    In 6 days Enrique Higuera 19 Hematology Oncology FOLLOW UP VISIT. CL  6M    In 1 week Rolan Garcia RN [facility]                      Recent Outpatient Visits              6 days ago Sickle cell anemia with crisis ([address]    [address] Visit    2 months ago Sickle cell anemia with crisis ([address]    [address] Visit    2 months ago Sickle cell anemia with crisis ([address]    [id number] [facility] 15    Office Visit    3 months ago Memory deficit    Brody Vyas MD    Office Visit    5 months ago Sickle cell disease, type sc, without crisis [facility])    [facility] AND [facility] Hematology Oncology    Nurse Only             Future Appointments         Provider Department Appt Notes    In 6 days Stacey 25 Hematology Oncology LABS: CBC,retic    In 6 days Enrique Vega 19 Hematology Oncology FOLLOW UP VISIT. CL  6M    In 1 week PING Tesfaye Ashleyberg

## 2023-07-08 ENCOUNTER — HOSPITAL ENCOUNTER (EMERGENCY)
Facility: HOSPITAL | Age: 63
Discharge: HOME | End: 2023-07-08
Attending: EMERGENCY MEDICINE
Payer: COMMERCIAL

## 2023-07-08 ENCOUNTER — APPOINTMENT (OUTPATIENT)
Dept: ULTRASOUND IMAGING | Facility: HOSPITAL | Age: 63
End: 2023-07-08
Attending: EMERGENCY MEDICINE
Payer: COMMERCIAL

## 2023-07-08 VITALS
RESPIRATION RATE: 18 BRPM | WEIGHT: 245 LBS | HEART RATE: 78 BPM | SYSTOLIC BLOOD PRESSURE: 127 MMHG | HEIGHT: 62 IN | TEMPERATURE: 100 F | DIASTOLIC BLOOD PRESSURE: 69 MMHG | BODY MASS INDEX: 45.08 KG/M2 | OXYGEN SATURATION: 94 %

## 2023-07-08 DIAGNOSIS — M25.562 ACUTE PAIN OF LEFT KNEE: Primary | ICD-10-CM

## 2023-07-08 LAB
BASOPHILS # BLD AUTO: 0.04 X10(3) UL (ref 0–0.2)
BASOPHILS NFR BLD AUTO: 0.4 %
DEPRECATED RDW RBC AUTO: 48.2 FL (ref 35.1–46.3)
EOSINOPHIL # BLD AUTO: 0.04 X10(3) UL (ref 0–0.7)
EOSINOPHIL NFR BLD AUTO: 0.4 %
ERYTHROCYTE [DISTWIDTH] IN BLOOD BY AUTOMATED COUNT: 16.2 % (ref 11–15)
HCT VFR BLD AUTO: 31.5 %
HGB BLD-MCNC: 11.6 G/DL
HGB RETIC QN AUTO: 32.5 PG (ref 28.2–36.6)
IMM GRANULOCYTES # BLD AUTO: 0.07 X10(3) UL (ref 0–1)
IMM GRANULOCYTES NFR BLD: 0.6 %
IMM RETICS NFR: 0.33 RATIO (ref 0.1–0.3)
LYMPHOCYTES # BLD AUTO: 1.65 X10(3) UL (ref 1–4)
LYMPHOCYTES NFR BLD AUTO: 15.2 %
MCH RBC QN AUTO: 31.5 PG (ref 26–34)
MCHC RBC AUTO-ENTMCNC: 36.8 G/DL (ref 31–37)
MCV RBC AUTO: 85.6 FL
MONOCYTES # BLD AUTO: 0.86 X10(3) UL (ref 0.1–1)
MONOCYTES NFR BLD AUTO: 7.9 %
NEUTROPHILS # BLD AUTO: 8.22 X10 (3) UL (ref 1.5–7.7)
NEUTROPHILS # BLD AUTO: 8.22 X10(3) UL (ref 1.5–7.7)
NEUTROPHILS NFR BLD AUTO: 75.5 %
PLATELET # BLD AUTO: 361 10(3)UL (ref 150–450)
RBC # BLD AUTO: 3.68 X10(6)UL
RETICS # AUTO: 161 X10(3) UL (ref 22.5–147.5)
RETICS/RBC NFR AUTO: 4.6 %
WBC # BLD AUTO: 10.9 X10(3) UL (ref 4–11)

## 2023-07-08 PROCEDURE — 96361 HYDRATE IV INFUSION ADD-ON: CPT

## 2023-07-08 PROCEDURE — 96374 THER/PROPH/DIAG INJ IV PUSH: CPT

## 2023-07-08 PROCEDURE — 99285 EMERGENCY DEPT VISIT HI MDM: CPT

## 2023-07-08 PROCEDURE — 85025 COMPLETE CBC W/AUTO DIFF WBC: CPT | Performed by: EMERGENCY MEDICINE

## 2023-07-08 PROCEDURE — 85045 AUTOMATED RETICULOCYTE COUNT: CPT | Performed by: EMERGENCY MEDICINE

## 2023-07-08 PROCEDURE — 93971 EXTREMITY STUDY: CPT | Performed by: EMERGENCY MEDICINE

## 2023-07-08 RX ORDER — MORPHINE SULFATE 2 MG/ML
2 INJECTION, SOLUTION INTRAMUSCULAR; INTRAVENOUS ONCE
Status: COMPLETED | OUTPATIENT
Start: 2023-07-08 | End: 2023-07-08

## 2023-07-08 RX ORDER — HYDROCODONE BITARTRATE AND ACETAMINOPHEN 10; 325 MG/1; MG/1
TABLET ORAL EVERY 4 HOURS PRN
Qty: 12 TABLET | Refills: 0 | Status: SHIPPED | OUTPATIENT
Start: 2023-07-08 | End: 2023-07-13

## 2023-07-08 NOTE — ED INITIAL ASSESSMENT (HPI)
Pt to the ed for reported sickle cell crisis located in left hip radiating to left foot x 3 days  No pain meds taken today

## 2023-07-08 NOTE — DISCHARGE INSTRUCTIONS
Take Tylenol as needed for pain. If your pain is not relieved with Tylenol  you can take Norco as needed for severe pain. Each tablet of Norco has 325 milligrams acetaminophen (Tylenol). Do not take more than 3900 mg acetaminophen (Tylenol) in 1 day. Do not drink alcohol or drive while taking Norco.  Take an over-the-counter stool softener such as Colace while taking Norco.  Risk of addiction to pain medication increases after 3 days, make sure to use this for shortest duration as possible and only for severe pain. Use the Ace wrap as needed while having knee pain and swelling. Ice your knee for 15 to 20 minutes several times throughout the day. See primary care for follow-up if not improving in the next 5 days. Return to the ER if you develop worsening symptoms, fever, or any emergent concerns.

## 2023-07-12 ENCOUNTER — OFFICE VISIT (OUTPATIENT)
Dept: HEMATOLOGY/ONCOLOGY | Facility: HOSPITAL | Age: 63
End: 2023-07-12
Attending: INTERNAL MEDICINE
Payer: COMMERCIAL

## 2023-07-12 VITALS
DIASTOLIC BLOOD PRESSURE: 56 MMHG | WEIGHT: 204 LBS | HEART RATE: 64 BPM | SYSTOLIC BLOOD PRESSURE: 130 MMHG | TEMPERATURE: 98 F | OXYGEN SATURATION: 94 % | BODY MASS INDEX: 36.6 KG/M2 | RESPIRATION RATE: 18 BRPM | HEIGHT: 62.52 IN

## 2023-07-12 DIAGNOSIS — D57.20 SICKLE CELL DISEASE, TYPE SC, WITHOUT CRISIS (HCC): Primary | ICD-10-CM

## 2023-07-12 DIAGNOSIS — G89.29 OTHER CHRONIC PAIN: ICD-10-CM

## 2023-07-12 DIAGNOSIS — D57.20 SICKLE CELL DISEASE, TYPE SC, WITHOUT CRISIS (HCC): ICD-10-CM

## 2023-07-12 DIAGNOSIS — D57.00 SICKLE CELL ANEMIA WITH CRISIS (HCC): ICD-10-CM

## 2023-07-12 DIAGNOSIS — Z79.891 LONG TERM (CURRENT) USE OF OPIATE ANALGESIC: ICD-10-CM

## 2023-07-12 LAB
BASOPHILS # BLD AUTO: 0.06 X10(3) UL (ref 0–0.2)
BASOPHILS NFR BLD AUTO: 0.8 %
DEPRECATED RDW RBC AUTO: 48.9 FL (ref 35.1–46.3)
EOSINOPHIL # BLD AUTO: 0.32 X10(3) UL (ref 0–0.7)
EOSINOPHIL NFR BLD AUTO: 4 %
ERYTHROCYTE [DISTWIDTH] IN BLOOD BY AUTOMATED COUNT: 15.5 % (ref 11–15)
HCT VFR BLD AUTO: 29.2 %
HGB BLD-MCNC: 10.6 G/DL
HGB RETIC QN AUTO: 29.5 PG (ref 28.2–36.6)
IMM GRANULOCYTES # BLD AUTO: 0.04 X10(3) UL (ref 0–1)
IMM GRANULOCYTES NFR BLD: 0.5 %
IMM RETICS NFR: 0.28 RATIO (ref 0.1–0.3)
LYMPHOCYTES # BLD AUTO: 2.18 X10(3) UL (ref 1–4)
LYMPHOCYTES NFR BLD AUTO: 27.4 %
MCH RBC QN AUTO: 31.4 PG (ref 26–34)
MCHC RBC AUTO-ENTMCNC: 36.3 G/DL (ref 31–37)
MCV RBC AUTO: 86.4 FL
MONOCYTES # BLD AUTO: 1.06 X10(3) UL (ref 0.1–1)
MONOCYTES NFR BLD AUTO: 13.3 %
NEUTROPHILS # BLD AUTO: 4.29 X10 (3) UL (ref 1.5–7.7)
NEUTROPHILS # BLD AUTO: 4.29 X10(3) UL (ref 1.5–7.7)
NEUTROPHILS NFR BLD AUTO: 54 %
PLATELET # BLD AUTO: 324 10(3)UL (ref 150–450)
PLATELET MORPHOLOGY: NORMAL
RBC # BLD AUTO: 3.38 X10(6)UL
RETICS # AUTO: 167.3 X10(3) UL (ref 22.5–147.5)
RETICS/RBC NFR AUTO: 5 %
WBC # BLD AUTO: 8 X10(3) UL (ref 4–11)

## 2023-07-12 PROCEDURE — 85045 AUTOMATED RETICULOCYTE COUNT: CPT

## 2023-07-12 PROCEDURE — 36415 COLL VENOUS BLD VENIPUNCTURE: CPT

## 2023-07-12 PROCEDURE — 99214 OFFICE O/P EST MOD 30 MIN: CPT | Performed by: INTERNAL MEDICINE

## 2023-07-12 PROCEDURE — 85025 COMPLETE CBC W/AUTO DIFF WBC: CPT

## 2023-07-12 RX ORDER — HYDROCODONE BITARTRATE AND ACETAMINOPHEN 10; 325 MG/1; MG/1
TABLET ORAL
Qty: 90 TABLET | Refills: 0 | Status: SHIPPED | OUTPATIENT
Start: 2023-07-12

## 2023-07-13 ENCOUNTER — ANTI-COAG VISIT (OUTPATIENT)
Dept: ANTICOAGULATION | Facility: CLINIC | Age: 63
End: 2023-07-13

## 2023-07-13 DIAGNOSIS — Z51.81 ENCOUNTER FOR THERAPEUTIC DRUG MONITORING: ICD-10-CM

## 2023-07-13 DIAGNOSIS — D57.00 HB-SS DISEASE WITH CRISIS (HCC): ICD-10-CM

## 2023-07-13 DIAGNOSIS — Z79.01 MONITORING FOR LONG-TERM ANTICOAGULANT USE: ICD-10-CM

## 2023-07-13 DIAGNOSIS — D57.20 SICKLE CELL DISEASE, TYPE SC, WITHOUT CRISIS (HCC): Primary | ICD-10-CM

## 2023-07-13 DIAGNOSIS — Z79.01 LONG TERM (CURRENT) USE OF ANTICOAGULANTS: ICD-10-CM

## 2023-07-13 DIAGNOSIS — Z51.81 MONITORING FOR LONG-TERM ANTICOAGULANT USE: ICD-10-CM

## 2023-07-13 LAB
INR: 2.2 (ref 0.8–1.2)
TEST STRIP EXPIRATION DATE: ABNORMAL DATE

## 2023-07-13 PROCEDURE — 85610 PROTHROMBIN TIME: CPT | Performed by: FAMILY MEDICINE

## 2023-07-13 PROCEDURE — 93793 ANTICOAG MGMT PT WARFARIN: CPT | Performed by: FAMILY MEDICINE

## 2023-07-13 NOTE — PROGRESS NOTES
Face to face. INR is minimally below therapeutic goal range. Denies any change in diet/meds. No missed doses. Per protocol, continue current dose and recheck INR in 4 weeks.      7.5 mg every Sun, Wed, Sat; 5 mg all other days

## 2023-07-17 RX ORDER — WARFARIN SODIUM 5 MG/1
TABLET ORAL NIGHTLY
Qty: 120 TABLET | Refills: 1 | Status: SHIPPED | OUTPATIENT
Start: 2023-07-17

## 2023-07-17 NOTE — TELEPHONE ENCOUNTER
Failed per [address] [facility] protocol. Failed due to lab results. Patient is compliant with the coumadin clinic/INR checks. Requested Prescriptions   Pending Prescriptions Disp Refills    WARFARIN 5 MG Oral Tab [Pharmacy Med Name: WARFARIN SODIUM 5 MG TABLET] 120 tablet 1     Sig: Take 1-1.5 tablets (5-7.5 mg total) by mouth nightly. Take as directed by the coumadin clinic. Take one tablet (5 mg) four nights a week. Take one and a half tablets (7.5 mg) three nights a week. Rx Em Warfarin Protocol Failed - 7/17/2023  2:47 PM        Failed - CMP within past 12 months     No results found for this or any previous visit (from the past 4380 hour(s)).                 Failed - AST < 111 (less than 3 Xs the upper limit)     No results found for: AST                  Failed - ALT < 168 (less than 3Xs the upper limit)     No results found for: ALT                  Passed - Appointment in past 12 or next 3 months     Recent Outpatient Visits              5 days ago Sickle cell disease, type sc, without crisis [facility] [place] [facility])    [facility] Hematology Oncology Bimal Moreno MD    Office Visit    5 days ago Sickle cell disease, type sc, without crisis [facility] [place] [facility])    [facility] Hematology Oncology    Nurse Only    2 weeks ago Sickle cell anemia with crisis ([address]    [address] Visit    3 months ago Sickle cell anemia with crisis ([address]    [address] Visit    3 months ago Sickle cell anemia with crisis ([facility] [address]    [address] Visit          Future Appointments         Provider Department Appt Notes    In 3 weeks PING [address] Ashleyberg     In 1 month MD Sabrina Donis, 59 [facility] fluid in knee    In 6 months [address] [facility] Hematology Oncology LABS: CBC,retic    In 6 months Enrique Gallo 19 Hematology Oncology FOLLOW UP VISIT. CL  6M               Passed - INR 3.9 or less past 6 weeks     INR   Date Value Ref Range Status   07/13/2023 2.2 (A) 0.8 - 1.2 Final                   Passed - CBC within the past 12 months     Recent Results (from the past 8760 hour(s))   CBC W/ DIFFERENTIAL    Collection Time: 07/12/23 10:11 AM   Result Value Ref Range    WBC 8.0 4.0 - 11.0 x10(3) uL    RBC 3.38 (L) 3.80 - 5.30 x10(6)uL    HGB 10.6 (L) 12.0 - 16.0 g/dL    HCT 29.2 (L) 35.0 - 48.0 %    MCV 86.4 80.0 - 100.0 fL    MCH 31.4 26.0 - 34.0 pg    MCHC 36.3 31.0 - 37.0 g/dL    RDW-SD 48.9 (H) 35.1 - 46.3 fL    RDW 15.5 (H) 11.0 - 15.0 %    .0 150.0 - 450.0 10(3)uL    Neutrophil Absolute Prelim 4.29 1.50 - 7.70 x10 (3) uL    Neutrophil Absolute 4.29 1.50 - 7.70 x10(3) uL    Lymphocyte Absolute 2.18 1.00 - 4.00 x10(3) uL    Monocyte Absolute 1.06 (H) 0.10 - 1.00 x10(3) uL    Eosinophil Absolute 0.32 0.00 - 0.70 x10(3) uL    Basophil Absolute 0.06 0.00 - 0.20 x10(3) uL    Immature Granulocyte Absolute 0.04 0.00 - 1.00 x10(3) uL    Neutrophil % 54.0 %    Lymphocyte % 27.4 %    Monocyte % 13.3 %    Eosinophil % 4.0 %    Basophil % 0.8 %    Immature Granulocyte % 0.5 %     *Note: Due to a large number of results and/or encounters for the requested time period, some results have not been displayed. A complete set of results can be found in Results Review.                  Passed - Hgb >/= 10g/dl within past 12 months     HGB   Date Value Ref Range Status   07/12/2023 10.6 (L) 12.0 - 16.0 g/dL Final                   Passed - Platelets >/= 470 past 12 months     PLT   Date Value Ref Range Status   07/12/2023 324.0 150.0 - 450.0 10(3)uL Final                        Recent Outpatient Visits              5 days ago Sickle cell disease, type sc, without crisis [facility])    [facility] H[facility] Oncology Bimal Moreno MD    Office Visit    5 days ago Sickle cell disease, type sc, without crisis [facility])    [facility]    Nurse Only    2 weeks ago Sickle cell anemia with crisis ([facility] [address]    [address] Visit    3 months ago Sickle cell anemia with crisis ([facility] [address]    [address] Visit    3 months ago Sickle cell anemia with crisis ([facility] [address]    [address] Visit          Future Appointments         Provider Department Appt Notes    In 3 weeks Maryanne Cruz RN [address], [facility]     In 1 month Luis Mireles MD [address], 59 [facility] fluid in knee    In 6 months St[address] Hematology Oncology LABS: CBC,retic    In 6 months Enrique Mercado 19 Hematology Oncology FOLLOW UP VISIT. CL  6M

## 2023-07-18 RX ORDER — FOLIC ACID 1 MG/1
1 TABLET ORAL DAILY
Qty: 90 TABLET | Refills: 3 | Status: SHIPPED | OUTPATIENT
Start: 2023-07-18

## 2023-07-18 NOTE — TELEPHONE ENCOUNTER
Please review; no protocol  Medication pended for your review and approval.     Requested Prescriptions   Pending Prescriptions Disp Refills    FOLIC ACID 1 MG Oral Tab [Pharmacy Med Name: FOLIC ACID 1 MG TABLET] 90 tablet 0     Sig: TAKE 1 TABLET BY MOUTH EVERY DAY       There is no refill protocol information for this order

## 2023-07-28 ENCOUNTER — HOSPITAL ENCOUNTER (OUTPATIENT)
Dept: CT IMAGING | Facility: HOSPITAL | Age: 63
Discharge: HOME | End: 2023-07-28
Attending: Other
Payer: COMMERCIAL

## 2023-07-28 DIAGNOSIS — D57.20 SICKLE CELL DISEASE, TYPE SC, WITHOUT CRISIS (HCC): ICD-10-CM

## 2023-07-28 DIAGNOSIS — G31.84 MCI (MILD COGNITIVE IMPAIRMENT): ICD-10-CM

## 2023-07-28 DIAGNOSIS — Z81.8 FAMILY HISTORY OF DEMENTIA: ICD-10-CM

## 2023-07-28 LAB
CREAT BLD-MCNC: 1 MG/DL
EGFRCR SERPLBLD CKD-EPI 2021: 64 ML/MIN/1.73M2 (ref 60–?)

## 2023-07-28 PROCEDURE — 70498 CT ANGIOGRAPHY NECK: CPT | Performed by: OTHER

## 2023-07-28 PROCEDURE — 82565 ASSAY OF CREATININE: CPT

## 2023-07-28 PROCEDURE — 70496 CT ANGIOGRAPHY HEAD: CPT | Performed by: OTHER

## 2023-08-10 ENCOUNTER — TELEPHONE (OUTPATIENT)
Dept: ANTICOAGULATION | Facility: CLINIC | Age: 63
End: 2023-08-10

## 2023-08-10 ENCOUNTER — ANTI-COAG VISIT (OUTPATIENT)
Dept: ANTICOAGULATION | Facility: CLINIC | Age: 63
End: 2023-08-10

## 2023-08-10 DIAGNOSIS — Z79.01 MONITORING FOR LONG-TERM ANTICOAGULANT USE: ICD-10-CM

## 2023-08-10 DIAGNOSIS — Z51.81 ENCOUNTER FOR THERAPEUTIC DRUG MONITORING: ICD-10-CM

## 2023-08-10 DIAGNOSIS — D57.20 SICKLE CELL DISEASE, TYPE SC, WITHOUT CRISIS (HCC): Primary | ICD-10-CM

## 2023-08-10 DIAGNOSIS — D57.00 HB-SS DISEASE WITH CRISIS (HCC): ICD-10-CM

## 2023-08-10 DIAGNOSIS — Z79.01 LONG TERM (CURRENT) USE OF ANTICOAGULANTS: ICD-10-CM

## 2023-08-10 DIAGNOSIS — Z51.81 MONITORING FOR LONG-TERM ANTICOAGULANT USE: ICD-10-CM

## 2023-08-10 LAB — INR: 2.1 (ref 2.5–3.5)

## 2023-08-10 PROCEDURE — 93793 ANTICOAG MGMT PT WARFARIN: CPT | Performed by: FAMILY MEDICINE

## 2023-08-10 PROCEDURE — 85610 PROTHROMBIN TIME: CPT | Performed by: FAMILY MEDICINE

## 2023-08-10 NOTE — PROGRESS NOTES
Patient in clinic for visit. INR below goal range. Reports missed a dose of Coumadin over the last week. Denied any changes with medications. Confirmed daily dose. Protocol: increase weekly dose 5-10%  Outside Protocol: Advised extra partial dose today, recheck INR 3-4 weeks. Will hold off on weekly dose change as patient reports has been forgetting to take doses over the last month. She agrees to start using a pill box and will use dosing calendar for reminders.      8/10: 7.5 mg; Otherwise 7.5 mg every Sun, Wed, Sat; 5 mg all other days

## 2023-08-10 NOTE — TELEPHONE ENCOUNTER
INR: 2.1      Goal: 2.5-3.5    Patient in clinic for visit. INR below goal range. Reports missed a dose of Coumadin over the last week. Denied any changes with medications. Confirmed daily dose. Protocol: increase weekly dose 5-10%  Outside Protocol: Advised extra partial dose today, recheck INR 3-4 weeks. Will hold off on weekly dose change as patient reports has been forgetting to take doses over the last month. She agrees to start using a pill box and will use dosing calendar for reminders.       8/10: 7.5 mg; Otherwise 7.5 mg every Sun, Wed, Sat; 5 mg all other days

## 2023-08-16 ENCOUNTER — HOSPITAL ENCOUNTER (OUTPATIENT)
Dept: GENERAL RADIOLOGY | Facility: HOSPITAL | Age: 63
Discharge: HOME | End: 2023-08-16
Attending: ORTHOPAEDIC SURGERY
Payer: COMMERCIAL

## 2023-08-16 ENCOUNTER — OFFICE VISIT (OUTPATIENT)
Dept: ORTHOPEDICS CLINIC | Facility: CLINIC | Age: 63
End: 2023-08-16

## 2023-08-16 VITALS
SYSTOLIC BLOOD PRESSURE: 124 MMHG | BODY MASS INDEX: 40.48 KG/M2 | HEART RATE: 81 BPM | DIASTOLIC BLOOD PRESSURE: 82 MMHG | WEIGHT: 220 LBS | HEIGHT: 62 IN

## 2023-08-16 DIAGNOSIS — M25.562 CHRONIC PAIN OF LEFT KNEE: ICD-10-CM

## 2023-08-16 DIAGNOSIS — M25.562 CHRONIC PAIN OF LEFT KNEE: Primary | ICD-10-CM

## 2023-08-16 DIAGNOSIS — M17.12 PRIMARY OSTEOARTHRITIS OF LEFT KNEE: ICD-10-CM

## 2023-08-16 DIAGNOSIS — G89.29 CHRONIC PAIN OF LEFT KNEE: ICD-10-CM

## 2023-08-16 DIAGNOSIS — G89.29 CHRONIC PAIN OF LEFT KNEE: Primary | ICD-10-CM

## 2023-08-16 PROCEDURE — 73562 X-RAY EXAM OF KNEE 3: CPT | Performed by: ORTHOPAEDIC SURGERY

## 2023-08-16 PROCEDURE — 20610 DRAIN/INJ JOINT/BURSA W/O US: CPT | Performed by: ORTHOPAEDIC SURGERY

## 2023-08-16 PROCEDURE — 3074F SYST BP LT 130 MM HG: CPT | Performed by: ORTHOPAEDIC SURGERY

## 2023-08-16 PROCEDURE — 3079F DIAST BP 80-89 MM HG: CPT | Performed by: ORTHOPAEDIC SURGERY

## 2023-08-16 PROCEDURE — 3008F BODY MASS INDEX DOCD: CPT | Performed by: ORTHOPAEDIC SURGERY

## 2023-08-16 PROCEDURE — 99213 OFFICE O/P EST LOW 20 MIN: CPT | Performed by: ORTHOPAEDIC SURGERY

## 2023-08-16 RX ORDER — TRIAMCINOLONE ACETONIDE 40 MG/ML
40 INJECTION, SUSPENSION INTRA-ARTICULAR; INTRAMUSCULAR ONCE
Status: COMPLETED | OUTPATIENT
Start: 2023-08-16 | End: 2023-08-16

## 2023-08-16 RX ADMIN — TRIAMCINOLONE ACETONIDE 40 MG: 40 INJECTION, SUSPENSION INTRA-ARTICULAR; INTRAMUSCULAR at 11:37:00

## 2023-08-16 NOTE — PROGRESS NOTES
Per verbal order from Dr. Kathrin Wakefield, draw up and 4ml of 0.5% Marcaine and 1ml of Kenalog 40 for injection into left knee. Alfonso Malhotra MA  Patient provided education handout for cortisone injection.

## 2023-08-22 RX ORDER — AMLODIPINE BESYLATE AND BENAZEPRIL HYDROCHLORIDE 10; 20 MG/1; MG/1
1 CAPSULE ORAL DAILY
Qty: 90 CAPSULE | Refills: 1 | Status: SHIPPED | OUTPATIENT
Start: 2023-08-22

## 2023-08-22 NOTE — TELEPHONE ENCOUNTER
Please review; protocol failed. No active /future labs noted     Requested Prescriptions   Pending Prescriptions Disp Refills    AMLODIPINE BESY-BENAZEPRIL HCL 10-20 MG Oral Cap [Pharmacy Med Name: AMLODIPINE-BENAZEPRIL 10-20 MG] 90 capsule 1     Sig: TAKE 1 CAPSULE BY MOUTH EVERY DAY       Hypertensive Medications Protocol Failed - 8/21/2023 12:42 AM        Failed - CMP or BMP in past 6 months     No results found for this or any previous visit (from the past 4392 hour(s)). Passed - In person appointment in the past 12 or next 3 months     Recent Outpatient Visits              6 days ago Chronic pain of left knee    [address], [address] [facility] [address], Flor Sanchez MD    Office Visit    1 month ago Sickle cell disease, type sc, without crisis [facility])    [facility] Hematology Oncology Chacorta Dickson MD    Office Visit    1 month ago Sickle cell disease, type sc, without crisis [facility])    [facility] Hematology Oncology    Nurse Only    1 month ago Sickle cell anemia with crisis ([address]    [address] Visit    4 months ago Sickle cell anemia with crisis ([address]    [address] Visit          Future Appointments         Provider Department Appt Notes    In 2 weeks Claudia Nevarez RN [address], [facility]     In 5 months [address] Hematology Oncology LABS: CBC,retic    In 5 months Enrique Silvestre 19 Hematology Oncology FOLLOW UP VISIT. CL  6M               Passed - Last BP reading less than 140/90     BP Readings from Last 1 Encounters:  08/16/23 : 124/82              Passed - In person appointment or virtual visit in the past 6 months     Recent Outpatient Visits              6 days ago Chronic pain of left knee    Lance Jimenez MD    Office Visit    1 month ago Sickle cell disease, type sc, without crisis [facility])    [facility] Hematology Oncology Moira Arceo MD    Office Visit    1 month ago Sickle cell disease, type sc, without crisis [facility])    [facility] Hematology Oncology    Nurse Only    1 month ago Sickle cell anemia with crisis ([address]    [address] Visit    4 months ago Sickle cell anemia with crisis ([address]    [address] Visit          Future Appointments         Provider Department Appt Notes    In 2 weeks Valeriy Wang RN [address] [facility]     In 5 months [address] Hematology Oncology LABS: CBC,retic    In 5 months Enrique Murillo 19 Hematology Oncology FOLLOW UP VISIT. CL  6M               Passed - EGFRCR or GFRAA > 50     GFR Evaluation  EGFRCR: 64 , resulted on 7/28/2023             Recent Outpatient Visits              6 days ago Chronic pain of left knee    [address] [place] [address] Mustapha Morrison MD    Office Visit    1 month ago Sickle cell disease, type sc, without crisis [facility])    [facility] Hematology Oncology Moira Arceo MD    Office Visit    1 month ago Sickle cell disease, type sc, without crisis [facility])    [facility] Hematology Oncology    Nurse Only    1 month ago Sickle cell anemia with crisis ([address]    [address] Visit    4 months ago Sickle cell anemia with crisis ([address]    [address] Visit          Future Appointments         Provider Department Appt Notes    In 2 weeks [address], [address] [place] [address]     In 5 months [address] Hematology Oncology LABS: CBC,retic    In 5 months Enrique Murillo 19 Hematology Oncology FOLLOW UP VISIT. CL  6M

## 2023-09-05 RX ORDER — OXYBUTYNIN CHLORIDE 5 MG/1
5 TABLET ORAL DAILY
Qty: 90 TABLET | Refills: 3 | Status: SHIPPED | OUTPATIENT
Start: 2023-09-05 | End: 2023-11-28 | Stop reason: DRUGHIGH

## 2023-09-05 NOTE — TELEPHONE ENCOUNTER
Refill passed per [place] [employer] [place], [facility] protocol. Requested Prescriptions   Pending Prescriptions Disp Refills    OXYBUTYNIN 5 MG Oral Tab [Pharmacy Med Name: OXYBUTYNIN 5 MG TABLET] 90 tablet 1     Sig: Take 1 tablet (5 mg total) by mouth daily. Along with 15 mg       Genitourinary Medications Passed - 9/5/2023 12:40 AM        Passed - Patient does not have pulmonary hypertension on problem list        Passed - In person appointment or virtual visit in the past 12 mos or appointment in next 3 mos     Recent Outpatient Visits              2 weeks ago Chronic pain of left knee    Josue Garza MD    Office Visit    1 month ago Sickle cell disease, type sc, without crisis [facility])    [facility] Hematology Oncology Jessica Lucas MD    Office Visit    1 month ago Sickle cell disease, type sc, without crisis [facility])    [facility] Hematology Oncology    Nurse Only    2 months ago Sickle cell anemia with crisis ([facility] [address]    [address] Visit    4 months ago Sickle cell anemia with crisis ([facility] [address]    [address] Visit          Future Appointments         Provider Department Appt Notes    In 2 days Quince Gowers, RN [address], [facility]     In 4 months Stacey 25 Hematology Oncology LABS: CBC,retic    In 4 months Enrique Andersen 19 Hematology Oncology FOLLOW UP VISIT. CL  6M                    [unfilled]      [unfilled]

## 2023-09-07 ENCOUNTER — ANTI-COAG VISIT (OUTPATIENT)
Dept: ANTICOAGULATION | Facility: CLINIC | Age: 63
End: 2023-09-07

## 2023-09-07 DIAGNOSIS — Z51.81 ENCOUNTER FOR THERAPEUTIC DRUG MONITORING: ICD-10-CM

## 2023-09-07 DIAGNOSIS — D57.20 SICKLE CELL DISEASE, TYPE SC, WITHOUT CRISIS (HCC): Primary | ICD-10-CM

## 2023-09-07 DIAGNOSIS — Z79.01 MONITORING FOR LONG-TERM ANTICOAGULANT USE: ICD-10-CM

## 2023-09-07 DIAGNOSIS — Z79.01 LONG TERM (CURRENT) USE OF ANTICOAGULANTS: ICD-10-CM

## 2023-09-07 DIAGNOSIS — Z51.81 MONITORING FOR LONG-TERM ANTICOAGULANT USE: ICD-10-CM

## 2023-09-07 DIAGNOSIS — D57.00 HB-SS DISEASE WITH CRISIS (HCC): ICD-10-CM

## 2023-09-07 LAB — INR: 1.6 (ref 2.5–3.5)

## 2023-09-07 PROCEDURE — 85610 PROTHROMBIN TIME: CPT | Performed by: FAMILY MEDICINE

## 2023-09-07 PROCEDURE — 93793 ANTICOAG MGMT PT WARFARIN: CPT | Performed by: FAMILY MEDICINE

## 2023-09-07 NOTE — PROGRESS NOTES
Patient in clinic. Denies changes with medications/diet or missed doses. INR remains below goal. Advised per protocol, increase weekly dose 10-20%. Recheck INR in 1 week.     5 mg every Wed; 7.5 mg all other days

## 2023-09-13 ENCOUNTER — ANTI-COAG VISIT (OUTPATIENT)
Dept: ANTICOAGULATION | Facility: CLINIC | Age: 63
End: 2023-09-13

## 2023-09-13 DIAGNOSIS — Z79.01 LONG TERM (CURRENT) USE OF ANTICOAGULANTS: ICD-10-CM

## 2023-09-13 DIAGNOSIS — D57.00 HB-SS DISEASE WITH CRISIS (HCC): ICD-10-CM

## 2023-09-13 DIAGNOSIS — Z51.81 MONITORING FOR LONG-TERM ANTICOAGULANT USE: ICD-10-CM

## 2023-09-13 DIAGNOSIS — Z79.01 MONITORING FOR LONG-TERM ANTICOAGULANT USE: ICD-10-CM

## 2023-09-13 DIAGNOSIS — Z51.81 ENCOUNTER FOR THERAPEUTIC DRUG MONITORING: ICD-10-CM

## 2023-09-13 DIAGNOSIS — D57.20 SICKLE CELL DISEASE, TYPE SC, WITHOUT CRISIS (HCC): Primary | ICD-10-CM

## 2023-09-13 LAB
INR: 2.7 (ref 0.8–1.2)
TEST STRIP EXPIRATION DATE: ABNORMAL DATE

## 2023-09-13 PROCEDURE — 85610 PROTHROMBIN TIME: CPT | Performed by: FAMILY MEDICINE

## 2023-09-13 PROCEDURE — 93793 ANTICOAG MGMT PT WARFARIN: CPT | Performed by: FAMILY MEDICINE

## 2023-09-20 DIAGNOSIS — Z79.891 LONG TERM (CURRENT) USE OF OPIATE ANALGESIC: ICD-10-CM

## 2023-09-20 DIAGNOSIS — G89.29 OTHER CHRONIC PAIN: ICD-10-CM

## 2023-09-20 RX ORDER — HYDROCODONE BITARTRATE AND ACETAMINOPHEN 10; 325 MG/1; MG/1
TABLET ORAL
Qty: 90 TABLET | Refills: 0 | Status: SHIPPED | OUTPATIENT
Start: 2023-09-20 | End: 2023-10-10

## 2023-09-21 RX ORDER — OXYBUTYNIN CHLORIDE 15 MG/1
15 TABLET, EXTENDED RELEASE ORAL DAILY
Qty: 90 TABLET | Refills: 3 | OUTPATIENT
Start: 2023-09-21

## 2023-09-21 NOTE — TELEPHONE ENCOUNTER
Pt stated that she uses [facility] for her Oxbutynin. oxybutynin 5 MG Oral Tab 90 tablet 3 9/5/2023     Sig - Route: Take 1 tablet (5 mg total) by mouth daily. Along with 15 mg - Oral    Sent to pharmacy as:  Oxybutynin Chloride 5 MG Oral Tablet (Ditropan)    E-Prescribing Status: Receipt confirmed by pharmacy (9/5/2023  1:25 PM CDT)      Pharmacy    [facility] [address] [place] [address] AT [address], [phone], [phone]

## 2023-09-21 NOTE — TELEPHONE ENCOUNTER
Refill pass per protocol    Requested Prescriptions   Pending Prescriptions Disp Refills    OXYBUTYNIN CHLORIDE ER 15 MG Oral Tablet 24 Hr [Pharmacy Med Name: OXYBUTYNIN ER 15MG TABLETS] 90 tablet 3     Sig: TAKE 1 TABLET(15 MG) BY MOUTH DAILY       Genitourinary Medications Passed - 9/21/2023  6:08 AM        Passed - Patient does not have pulmonary hypertension on problem list        Passed - In person appointment or virtual visit in the past 12 mos or appointment in next 3 mos     Recent Outpatient Visits              1 month ago Chronic pain of left knee    Tomy Fink, Dmitry Hurtado, Moraima Bocanegra MD    Office Visit    2 months ago Sickle cell disease, type sc, without crisis [facility])    [facility] Hematology Oncology Coco Hewitt MD    Office Visit    2 months ago Sickle cell disease, type sc, without crisis [facility])    [facility] Hematology Oncology    Nurse Only    2 months ago Sickle cell anemia with crisis ([facility] [address]    [address] Visit    5 months ago Sickle cell anemia with crisis ([facility] [address]    [address] Visit          Future Appointments         Provider Department Appt Notes    In 2 weeks Kaur Copeland, RN [address], [facility]     In 4 months St[address] Hematology Oncology LABS: CBC,retic    In 4 months Enrique Davidson 19 Hematology Oncology FOLLOW UP VISIT. CL  6M

## 2023-10-10 ENCOUNTER — TELEPHONE (OUTPATIENT)
Dept: HEMATOLOGY/ONCOLOGY | Facility: HOSPITAL | Age: 63
End: 2023-10-10

## 2023-10-10 DIAGNOSIS — Z79.891 LONG TERM (CURRENT) USE OF OPIATE ANALGESIC: ICD-10-CM

## 2023-10-10 DIAGNOSIS — G89.29 OTHER CHRONIC PAIN: ICD-10-CM

## 2023-10-10 RX ORDER — HYDROCODONE BITARTRATE AND ACETAMINOPHEN 10; 325 MG/1; MG/1
TABLET ORAL
Qty: 90 TABLET | Refills: 0 | Status: SHIPPED | OUTPATIENT
Start: 2023-10-10

## 2023-10-10 NOTE — TELEPHONE ENCOUNTER
Patient requesting refill of Norco 10/325 mg  please send to [facility]. [facility] is out.

## 2023-10-11 ENCOUNTER — ANTI-COAG VISIT (OUTPATIENT)
Dept: ANTICOAGULATION | Facility: CLINIC | Age: 63
End: 2023-10-11

## 2023-10-11 DIAGNOSIS — D57.00 HB-SS DISEASE WITH CRISIS (HCC): ICD-10-CM

## 2023-10-11 DIAGNOSIS — Z51.81 ENCOUNTER FOR THERAPEUTIC DRUG MONITORING: ICD-10-CM

## 2023-10-11 DIAGNOSIS — Z51.81 MONITORING FOR LONG-TERM ANTICOAGULANT USE: ICD-10-CM

## 2023-10-11 DIAGNOSIS — Z79.01 LONG TERM (CURRENT) USE OF ANTICOAGULANTS: ICD-10-CM

## 2023-10-11 DIAGNOSIS — D57.20 SICKLE CELL DISEASE, TYPE SC, WITHOUT CRISIS (HCC): Primary | ICD-10-CM

## 2023-10-11 DIAGNOSIS — Z79.01 MONITORING FOR LONG-TERM ANTICOAGULANT USE: ICD-10-CM

## 2023-10-11 LAB
INR: 2.3 (ref 0.8–1.2)
TEST STRIP EXPIRATION DATE: ABNORMAL DATE

## 2023-10-11 PROCEDURE — 85610 PROTHROMBIN TIME: CPT | Performed by: FAMILY MEDICINE

## 2023-10-11 PROCEDURE — 93793 ANTICOAG MGMT PT WARFARIN: CPT | Performed by: FAMILY MEDICINE

## 2023-10-11 NOTE — PROGRESS NOTES
Face-to-Face  / INR 2.3, sub therapeutic. (Goal 3.0 )    Etiology: Pt went back to her previous dose which is more than she actually took. Given the result today we'll try increasing one day from her actual dose taken. Try 5mg MWF 7.5mg all other days. Recheck INR 4 weeks. Pt reports: Any missed doses: Calendar update to her actual doses taken. Medications changes: No    Spoke to: Daphne,  who verbalized understanding and agreement.     Plan per protocol: 5 mg every Mon, Wed, Fri; 7.5 mg all other days

## 2023-10-19 NOTE — TELEPHONE ENCOUNTER
Patient called for refill HYDROcodone-acetaminophen  MG Oral Tab.  Goes to [facility]
Rehabilitation services

## 2023-10-23 RX ORDER — LIDOCAINE 50 MG/G
PATCH TOPICAL
Qty: 60 PATCH | Refills: 2 | Status: SHIPPED | OUTPATIENT
Start: 2023-10-23

## 2023-10-24 ENCOUNTER — TELEPHONE (OUTPATIENT)
Dept: HEMATOLOGY/ONCOLOGY | Facility: HOSPITAL | Age: 63
End: 2023-10-24

## 2023-10-24 DIAGNOSIS — Z79.891 LONG TERM (CURRENT) USE OF OPIATE ANALGESIC: ICD-10-CM

## 2023-10-24 DIAGNOSIS — G89.29 OTHER CHRONIC PAIN: ICD-10-CM

## 2023-10-24 RX ORDER — HYDROCODONE BITARTRATE AND ACETAMINOPHEN 10; 325 MG/1; MG/1
TABLET ORAL
Qty: 90 TABLET | Refills: 0 | Status: SHIPPED | OUTPATIENT
Start: 2023-10-24

## 2023-10-24 NOTE — TELEPHONE ENCOUNTER
[facility]  Regarding medication      HYDROcodone-acetaminophen  MG Oral Tab     Product Backordered/ Unavailable.         Please call [phone]    Fax  [phone]

## 2023-10-24 NOTE — TELEPHONE ENCOUNTER
Called [facility]. [facility] to [facility]. New Rx sent to [facility] in [place] per pt request. Pt having increased pain, unable to fill pain meds. Pt is pushing fluids and staying hydrated.

## 2023-11-08 ENCOUNTER — ANTI-COAG VISIT (OUTPATIENT)
Dept: ANTICOAGULATION | Facility: CLINIC | Age: 63
End: 2023-11-08

## 2023-11-08 DIAGNOSIS — D57.00 HB-SS DISEASE WITH CRISIS (HCC): ICD-10-CM

## 2023-11-08 DIAGNOSIS — D57.20 SICKLE CELL DISEASE, TYPE SC, WITHOUT CRISIS (HCC): Primary | ICD-10-CM

## 2023-11-08 DIAGNOSIS — Z51.81 MONITORING FOR LONG-TERM ANTICOAGULANT USE: ICD-10-CM

## 2023-11-08 DIAGNOSIS — Z79.01 MONITORING FOR LONG-TERM ANTICOAGULANT USE: ICD-10-CM

## 2023-11-08 LAB
INR: 1.7 (ref 2.5–3.5)
TEST STRIP EXPIRATION DATE: ABNORMAL DATE

## 2023-11-08 PROCEDURE — 93793 ANTICOAG MGMT PT WARFARIN: CPT | Performed by: FAMILY MEDICINE

## 2023-11-08 PROCEDURE — 85610 PROTHROMBIN TIME: CPT | Performed by: FAMILY MEDICINE

## 2023-11-08 NOTE — PROGRESS NOTES
Face-to-Face    / INR 1.7, sub therapeutic. (Goal 3.0 )    Etiology: Pt denies missing a dose. I suspect she may have at some  point this week. Take 10mg today then resume the listed dose. She said she is using a pill container. Its been a hectic week for her though and something may have been missed. Recheck in one week. Recheck INR ONE week. Pt reports: Any missed doses: No   Medications changes: No    [place] verbalized understanding and agreement.     WARFARIN Plan per protocol: 11/8: 10 mg; Otherwise 5 mg every Mon, Wed, Fri; 7.5 mg all other days

## 2023-11-14 ENCOUNTER — OFFICE VISIT (OUTPATIENT)
Dept: HEMATOLOGY/ONCOLOGY | Facility: HOSPITAL | Age: 63
End: 2023-11-14
Attending: INTERNAL MEDICINE
Payer: COMMERCIAL

## 2023-11-14 ENCOUNTER — TELEPHONE (OUTPATIENT)
Dept: HEMATOLOGY/ONCOLOGY | Facility: HOSPITAL | Age: 63
End: 2023-11-14

## 2023-11-14 VITALS
WEIGHT: 201.31 LBS | TEMPERATURE: 98 F | DIASTOLIC BLOOD PRESSURE: 63 MMHG | BODY MASS INDEX: 37 KG/M2 | SYSTOLIC BLOOD PRESSURE: 128 MMHG | OXYGEN SATURATION: 95 % | RESPIRATION RATE: 20 BRPM | HEART RATE: 62 BPM

## 2023-11-14 DIAGNOSIS — D57.00 SICKLE CELL ANEMIA WITH CRISIS (HCC): Primary | ICD-10-CM

## 2023-11-14 PROCEDURE — 96360 HYDRATION IV INFUSION INIT: CPT

## 2023-11-14 PROCEDURE — 96361 HYDRATE IV INFUSION ADD-ON: CPT

## 2023-11-14 NOTE — TELEPHONE ENCOUNTER
Sickle Cell    Patient requesting fluids for SS pain to lower back, left knee, right shoulder. 10/10. Denies fevers, no sob or chest pain, denies n/v/d. Eating and drinking. Denies lightheadedness or dizziness. No urinary for bowel complaints. Spoke to [place] and to come to infusion asap for hydration. She verbalizes understanding.

## 2023-11-14 NOTE — PROGRESS NOTES
Pt for add on hydration for sickle cell pain. Fluids given over 2 hours. Tolerated well. She stated she feels better after fluids. She is scheduled for fluids again tomorrow. Discharged home ambulatory.

## 2023-11-15 ENCOUNTER — OFFICE VISIT (OUTPATIENT)
Dept: HEMATOLOGY/ONCOLOGY | Facility: HOSPITAL | Age: 63
End: 2023-11-15
Attending: INTERNAL MEDICINE
Payer: COMMERCIAL

## 2023-11-15 VITALS
OXYGEN SATURATION: 92 % | WEIGHT: 202.69 LBS | RESPIRATION RATE: 16 BRPM | TEMPERATURE: 98 F | DIASTOLIC BLOOD PRESSURE: 65 MMHG | SYSTOLIC BLOOD PRESSURE: 140 MMHG | BODY MASS INDEX: 37 KG/M2 | HEART RATE: 68 BPM

## 2023-11-15 DIAGNOSIS — D57.00 SICKLE CELL ANEMIA WITH CRISIS (HCC): Primary | ICD-10-CM

## 2023-11-15 PROCEDURE — 96360 HYDRATION IV INFUSION INIT: CPT

## 2023-11-15 PROCEDURE — 96361 HYDRATE IV INFUSION ADD-ON: CPT

## 2023-11-15 NOTE — PROGRESS NOTES
Pt here for 1L 0,9% Normal Saline over 2 Hours. Pt denies any issues or concerns. States she is feeling much better from yesterday's infusion. PIV placed with good blood return noted. Ordering MD: Dr. Maggie Garvey       Pt tolerated infusion without difficulty or complaint. Reviewed next apt date/time: 1/23 @ 11am Lab Appt. 12pm Dr. Mercy Moses F/U  PIV removed and gauze/tape applied to site.        Education Record  Learner:  Patient  Disease / Diagnosis: Sickle Cell Crisis  Barriers / Limitations:  None  Method:  Discussion  General Topics:  Plan of care reviewed  Outcome:  Shows understanding

## 2023-11-28 ENCOUNTER — OFFICE VISIT (OUTPATIENT)
Dept: FAMILY MEDICINE CLINIC | Facility: CLINIC | Age: 63
End: 2023-11-28

## 2023-11-28 VITALS
BODY MASS INDEX: 37.54 KG/M2 | HEART RATE: 56 BPM | SYSTOLIC BLOOD PRESSURE: 132 MMHG | WEIGHT: 204 LBS | HEIGHT: 62 IN | DIASTOLIC BLOOD PRESSURE: 76 MMHG | TEMPERATURE: 98 F

## 2023-11-28 DIAGNOSIS — R30.0 DYSURIA: Primary | ICD-10-CM

## 2023-11-28 DIAGNOSIS — Z79.01 LONG TERM (CURRENT) USE OF ANTICOAGULANTS: ICD-10-CM

## 2023-11-28 LAB
APPEARANCE: CLEAR
BILIRUBIN: NEGATIVE
GLUCOSE (URINE DIPSTICK): NEGATIVE MG/DL
KETONES (URINE DIPSTICK): NEGATIVE MG/DL
MULTISTIX LOT#: ABNORMAL NUMERIC
NITRITE, URINE: NEGATIVE
OCCULT BLOOD: NEGATIVE
PH, URINE: 5 (ref 4.5–8)
PROTEIN (URINE DIPSTICK): NEGATIVE MG/DL
SPECIFIC GRAVITY: 1.01 (ref 1–1.03)
URINE-COLOR: YELLOW
UROBILINOGEN,SEMI-QN: 0.2 MG/DL (ref 0–1.9)

## 2023-11-28 PROCEDURE — 99213 OFFICE O/P EST LOW 20 MIN: CPT | Performed by: FAMILY MEDICINE

## 2023-11-28 PROCEDURE — 3075F SYST BP GE 130 - 139MM HG: CPT | Performed by: FAMILY MEDICINE

## 2023-11-28 PROCEDURE — 3078F DIAST BP <80 MM HG: CPT | Performed by: FAMILY MEDICINE

## 2023-11-28 PROCEDURE — 3008F BODY MASS INDEX DOCD: CPT | Performed by: FAMILY MEDICINE

## 2023-11-28 PROCEDURE — 81002 URINALYSIS NONAUTO W/O SCOPE: CPT | Performed by: FAMILY MEDICINE

## 2023-11-28 RX ORDER — CEPHALEXIN 500 MG/1
500 CAPSULE ORAL 3 TIMES DAILY
Qty: 15 CAPSULE | Refills: 0 | Status: SHIPPED | OUTPATIENT
Start: 2023-11-28 | End: 2023-12-03

## 2023-12-11 ENCOUNTER — PATIENT OUTREACH (OUTPATIENT)
Dept: CASE MANAGEMENT | Age: 63
End: 2023-12-11

## 2023-12-11 NOTE — PROGRESS NOTES
VM received; pt requesting assistance w/scheduling apt (discharged 07/08/23)    Pt was trying to call Dr Anabelle Stanton's office    Dr Dario Doll  Radiology  [facility], [place]  [address]   [place] [address], [facility]  [phone]    Transferred pt to the office to schedule her apt  Closing encounter

## 2023-12-13 ENCOUNTER — ANTI-COAG VISIT (OUTPATIENT)
Dept: ANTICOAGULATION | Facility: CLINIC | Age: 63
End: 2023-12-13

## 2023-12-13 DIAGNOSIS — D57.20 SICKLE CELL DISEASE, TYPE SC, WITHOUT CRISIS (HCC): Primary | ICD-10-CM

## 2023-12-13 DIAGNOSIS — D57.00 HB-SS DISEASE WITH CRISIS (HCC): ICD-10-CM

## 2023-12-13 DIAGNOSIS — Z79.01 MONITORING FOR LONG-TERM ANTICOAGULANT USE: ICD-10-CM

## 2023-12-13 DIAGNOSIS — Z51.81 MONITORING FOR LONG-TERM ANTICOAGULANT USE: ICD-10-CM

## 2023-12-13 LAB
INR: 2.1 (ref 2.5–3.5)
TEST STRIP EXPIRATION DATE: ABNORMAL DATE

## 2023-12-13 PROCEDURE — 85610 PROTHROMBIN TIME: CPT | Performed by: FAMILY MEDICINE

## 2023-12-13 PROCEDURE — 93793 ANTICOAG MGMT PT WARFARIN: CPT | Performed by: FAMILY MEDICINE

## 2023-12-13 NOTE — PROGRESS NOTES
Face-to-Face  / INR 2.1, sub therapeutic. (Goal 3.0 ) TTR 59.1 %     Etiology: she missed Mondays dose. We discussed keeping INR therapeutic to avoid small clotting not just the big ones. She mentioned the veins in her legs get red right up. Currently not a problem but THIS is what will start a problem. Currently no redness,  but her INR needs to stay therapeutic. Apply hot packs, and She needs to call MD or ER if they become painful  or red and swollen. She verbalized understanding. PLAN: take the missed warfarin ASAP then contineu the usual dose. Recheck INR Monday next week. Pt reports: Any missed doses: YES>   Medications changes: No    Daphne verbalized understanding and agreement.     WARFARIN Plan per protocol: 12/13: 10 mg; Otherwise 5 mg every Mon, Wed, Fri; 7.5 mg all other days

## 2023-12-18 ENCOUNTER — ANTI-COAG VISIT (OUTPATIENT)
Dept: ANTICOAGULATION | Facility: CLINIC | Age: 63
End: 2023-12-18

## 2023-12-18 DIAGNOSIS — D57.20 SICKLE CELL DISEASE, TYPE SC, WITHOUT CRISIS (HCC): Primary | ICD-10-CM

## 2023-12-18 DIAGNOSIS — Z51.81 MONITORING FOR LONG-TERM ANTICOAGULANT USE: ICD-10-CM

## 2023-12-18 DIAGNOSIS — Z79.01 MONITORING FOR LONG-TERM ANTICOAGULANT USE: ICD-10-CM

## 2023-12-18 DIAGNOSIS — D57.00 HB-SS DISEASE WITH CRISIS (HCC): ICD-10-CM

## 2023-12-18 LAB
INR: 4.1 (ref 2.5–3.5)
TEST STRIP EXPIRATION DATE: ABNORMAL DATE

## 2023-12-18 PROCEDURE — 85610 PROTHROMBIN TIME: CPT | Performed by: FAMILY MEDICINE

## 2023-12-18 PROCEDURE — 93793 ANTICOAG MGMT PT WARFARIN: CPT | Performed by: FAMILY MEDICINE

## 2023-12-18 NOTE — PROGRESS NOTES
Face-to-Face  / INR 4.1, supra therapeutic. (Goal 3.0 ) TTR 59.1 %     Etiology: working on education and  no missed doses. PLAN: continue the current dose. Recheck INR 2 weeks. Pt reports: Any missed doses: No, extra. Medications changes: No    [place] verbalized understanding and agreement.     WARFARIN Plan per protocol: 5 mg every Mon, Wed, Fri; 7.5 mg all other days

## 2023-12-21 ENCOUNTER — TELEPHONE (OUTPATIENT)
Dept: ORTHOPEDICS CLINIC | Facility: CLINIC | Age: 63
End: 2023-12-21

## 2023-12-21 NOTE — TELEPHONE ENCOUNTER
Spoke with patient. Has fluid build up causing pain in her knee with ambulation and bending. No pain at rest. Is icing and elevating. Is on pain medicine for co-existing Sickle Cell disease which is not touching knee pain. Last seen in August 23. Wanted to get in to have it drained. Gave her first available with Dr. Flaquita Rao, declined slightly earlier appointment with other provider. Scheduled appointment on 2/5/23 at 2:30 pm, Placed on wait list high priority. Advised to seek emergent care if pain becomes unbearable. Verbalized understanding. No further questions.

## 2023-12-21 NOTE — TELEPHONE ENCOUNTER
Per pt states fluid has built up in knee area, states it is causing her a lot of pain, asking if she can be seen soon? Please advise thank you.

## 2023-12-26 ENCOUNTER — TELEPHONE (OUTPATIENT)
Dept: HEMATOLOGY/ONCOLOGY | Facility: HOSPITAL | Age: 63
End: 2023-12-26

## 2023-12-27 ENCOUNTER — TELEPHONE (OUTPATIENT)
Dept: HEMATOLOGY/ONCOLOGY | Facility: HOSPITAL | Age: 63
End: 2023-12-27

## 2023-12-27 ENCOUNTER — TELEPHONE (OUTPATIENT)
Dept: FAMILY MEDICINE CLINIC | Facility: CLINIC | Age: 63
End: 2023-12-27

## 2023-12-27 NOTE — TELEPHONE ENCOUNTER
Patient got a [facility] duty letter and she states she just can not do it. Is requesting a letter that she can send to them stating need not to participate. She needs letter today or tomorrow. Could it be emailed to her or she can pick it up. Requesting a call back when available.

## 2023-12-27 NOTE — TELEPHONE ENCOUNTER
for  pt stated that has [employer] duty on Feb 6 ,2023 and she has sickle cell disease and she needs a letter to be excuse from [employer] duty. Pt will pick it up once its ready. I have pended the letter for your review and approval. Pt is ok with putting on the letter she has sickle cell disease. I have pended the letter for your review and approval.   Onsite staff please call pt once the letter is ready for .

## 2024-01-03 ENCOUNTER — ANTI-COAG VISIT (OUTPATIENT)
Dept: ANTICOAGULATION | Facility: CLINIC | Age: 64
End: 2024-01-03

## 2024-01-03 DIAGNOSIS — D57.20 SICKLE CELL DISEASE, TYPE SC, WITHOUT CRISIS (HCC): Primary | ICD-10-CM

## 2024-01-03 DIAGNOSIS — D57.00 HB-SS DISEASE WITH CRISIS (HCC): ICD-10-CM

## 2024-01-03 DIAGNOSIS — Z51.81 MONITORING FOR LONG-TERM ANTICOAGULANT USE: ICD-10-CM

## 2024-01-03 DIAGNOSIS — Z79.01 MONITORING FOR LONG-TERM ANTICOAGULANT USE: ICD-10-CM

## 2024-01-03 LAB
INR: 2.3 (ref 2–3)
TEST STRIP EXPIRATION DATE: ABNORMAL DATE

## 2024-01-03 PROCEDURE — 85610 PROTHROMBIN TIME: CPT | Performed by: FAMILY MEDICINE

## 2024-01-03 PROCEDURE — 93793 ANTICOAG MGMT PT WARFARIN: CPT | Performed by: FAMILY MEDICINE

## 2024-01-03 NOTE — PROGRESS NOTES
Face-to-Face  / INR 2.3, sub therapeutic. (Goal 3.0 ) TTR 59.0 %     Etiology: Pt has been taking 2.5mg Less per week in error.     PLAN: discussed how dosing  errors happen. Go back to 7.5mg 4 days. Consider same day dose to reduce weekly errors. She is using a pill container reliably.    Consider adding a 3mg for 6.5mg every day.    Recheck INR 2 weeks.    Pt reports:    No sign of unusual bruising or bleeding.  Any missed doses: Yes, 2.5mg every week.   Medications changes: No    Daphne verbalized understanding and agreement.    WARFARIN Plan per protocol: 5 mg every Mon, Wed, Fri; 7.5 mg all other days

## 2024-01-10 ENCOUNTER — OFFICE VISIT (OUTPATIENT)
Dept: FAMILY MEDICINE CLINIC | Facility: CLINIC | Age: 64
End: 2024-01-10
Payer: COMMERCIAL

## 2024-01-10 VITALS
SYSTOLIC BLOOD PRESSURE: 124 MMHG | DIASTOLIC BLOOD PRESSURE: 75 MMHG | HEART RATE: 74 BPM | OXYGEN SATURATION: 94 % | HEIGHT: 62 IN | BODY MASS INDEX: 37.17 KG/M2 | WEIGHT: 202 LBS | RESPIRATION RATE: 18 BRPM

## 2024-01-10 DIAGNOSIS — R41.3 MEMORY DEFICIT: ICD-10-CM

## 2024-01-10 DIAGNOSIS — Z12.31 SCREENING MAMMOGRAM FOR BREAST CANCER: Primary | ICD-10-CM

## 2024-01-10 DIAGNOSIS — R91.1 PULMONARY NODULE: ICD-10-CM

## 2024-01-10 DIAGNOSIS — Z00.00 ANNUAL PHYSICAL EXAM: ICD-10-CM

## 2024-01-10 PROCEDURE — 99213 OFFICE O/P EST LOW 20 MIN: CPT | Performed by: FAMILY MEDICINE

## 2024-01-10 PROCEDURE — 3008F BODY MASS INDEX DOCD: CPT | Performed by: FAMILY MEDICINE

## 2024-01-10 PROCEDURE — 3078F DIAST BP <80 MM HG: CPT | Performed by: FAMILY MEDICINE

## 2024-01-10 PROCEDURE — 99396 PREV VISIT EST AGE 40-64: CPT | Performed by: FAMILY MEDICINE

## 2024-01-10 PROCEDURE — 3074F SYST BP LT 130 MM HG: CPT | Performed by: FAMILY MEDICINE

## 2024-01-10 NOTE — PROGRESS NOTES
Subjective:   Patient ID: Daphne Reynoso is a 63 year old female.    Patient here for annual physical   Also f/u hypertension   Patient for f/u hypertension   Denies any chest pain shortness of breath or headaches.   Monitoring blood pressure at home and is below 135/85. Needs refill of medications.     Also memory problems -never followed up with dr Pena   Review of chart states that memory issues most likely multifactorial   Also appears that never had ct scan chest for accidentally found pulmonary nodule         History/Other:   Review of Systems    Constitutional: Negative.  Negative for activity change, appetite change, diaphoresis and fatigue.     Respiratory: Negative.  Negative for apnea, cough, chest tightness and shortness of breath.    Cardiovascular: Negative.  Negative for chest pain, palpitations and leg swelling.   Gastrointestinal: Negative.  Negative for abdominal pain.   Skin: Negative.           Psychiatric/Behavioral: issues with memory   Current Outpatient Medications   Medication Sig Dispense Refill    HYDROcodone-acetaminophen  MG Oral Tab Half to one tablet every four hours as needed for pain 90 tablet 0    lidocaine 5 % External Patch PLACE 2 PATCHES ONTO THE SKIN AT NIGHT PLACE FOR 12 HOURS ON AND 12 HOURS OFF 60 patch 2    amLODIPine Besy-Benazepril HCl 10-20 MG Oral Cap Take 1 capsule by mouth daily. 90 capsule 1    folic acid 1 MG Oral Tab Take 1 tablet (1 mg total) by mouth daily. 90 tablet 3    warfarin 5 MG Oral Tab Take 1-1.5 tablets (5-7.5 mg total) by mouth nightly. Take as directed by the coumadin clinic. Take one tablet (5 mg) four nights a week. Take one and a half tablets (7.5 mg) three nights a week. 120 tablet 1    Oxybutynin Chloride ER 15 MG Oral Tablet 24 Hr Take 1 tablet (15 mg total) by mouth daily. Take with 1 tablet (5mg )daily for total of 20 mg daily. 90 tablet 3    fluticasone propionate 50 MCG/ACT Nasal Suspension 2 sprays by Nasal route daily. 48 mL 3     albuterol 108 (90 Base) MCG/ACT Inhalation Aero Soln Inhale 2 puffs into the lungs every 4 (four) hours as needed. 42.5 each 1    Calcium Citrate-Vitamin D 315-250 MG-UNIT Oral Tab Take 1 tablet by mouth daily.      Vitamin D3, Cholecalciferol, 10 MCG (400 UNIT) Oral Tab Take 2.5 tablets (1,000 Units total) by mouth daily.       Allergies:  Allergies   Allergen Reactions    Codeine PALPITATIONS    Morphine PALPITATIONS    Opioid Analgesics SHORTNESS OF BREATH     heart races with the use of codeine  Itching and tachycardia      Sulfa Antibiotics SHORTNESS OF BREATH     Rash and tongue swells, itching  Other reaction(s): Swelling - oral/pharyngeal    Dust Mite Extract OTHER (SEE COMMENTS)     Stuffy nose, HA       Objective:   Physical Exam  Constitutional:       General: She is not in acute distress.     Appearance: She is well-developed.   HENT:      Head: Normocephalic.   Eyes:      General:         Right eye: No discharge.         Left eye: No discharge.   Neck:      Thyroid: No thyromegaly.      Vascular: No JVD.   Cardiovascular:      Rate and Rhythm: Normal rate.      Heart sounds: Normal heart sounds.   Pulmonary:      Effort: No respiratory distress.      Breath sounds: Normal breath sounds. No wheezing.   Chest:      Chest wall: No tenderness.   Abdominal:      General: Bowel sounds are normal. There is no distension.      Palpations: Abdomen is soft. There is no mass.      Tenderness: There is no abdominal tenderness. There is no guarding or rebound.   Genitourinary:     Adnexa:         Right: No mass, tenderness or fullness.          Left: No mass, tenderness or fullness.     Musculoskeletal:         General: Normal range of motion.      Cervical back: Neck supple.   Skin:     General: Skin is warm and dry.   Neurological:      Mental Status: She is alert and oriented to person, place, and time.      Deep Tendon Reflexes: Reflexes are normal and symmetric.   Psychiatric:         Behavior: Behavior normal.          Thought Content: Thought content normal.         Assessment & Plan:   1. Screening mammogram for breast cancer    2. Pulmonary nodule    3. Annual physical exam    4. Memory deficit    5. Screening for cervical cancer    Appropriate tests ordered   Needs to see dr Pena again   F/u in 1 month    Orders Placed This Encounter   Procedures    Comp Metabolic Panel (14)    Lipid Panel    Assay, Thyroid Stim Hormone    Vitamin B12    Vitamin D [E]    Hpv Dna  High Risk , Thin Prep Collect    ThinPrep PAP Smear       Meds This Visit:  Requested Prescriptions      No prescriptions requested or ordered in this encounter       Imaging & Referrals:  NEURO - INTERNAL  SANTANA SALVATORE 2D+3D SCREENING BILAT (CPT=77067/75175)  CT CHEST (CPT=71250)

## 2024-01-11 ENCOUNTER — APPOINTMENT (OUTPATIENT)
Dept: HEMATOLOGY/ONCOLOGY | Facility: HOSPITAL | Age: 64
End: 2024-01-11
Attending: INTERNAL MEDICINE
Payer: COMMERCIAL

## 2024-01-17 ENCOUNTER — ANTI-COAG VISIT (OUTPATIENT)
Dept: ANTICOAGULATION | Facility: CLINIC | Age: 64
End: 2024-01-17

## 2024-01-17 ENCOUNTER — OFFICE VISIT (OUTPATIENT)
Dept: ORTHOPEDICS CLINIC | Facility: CLINIC | Age: 64
End: 2024-01-17

## 2024-01-17 VITALS
HEART RATE: 63 BPM | HEIGHT: 62 IN | DIASTOLIC BLOOD PRESSURE: 75 MMHG | WEIGHT: 203.38 LBS | BODY MASS INDEX: 37.43 KG/M2 | SYSTOLIC BLOOD PRESSURE: 130 MMHG

## 2024-01-17 DIAGNOSIS — Z51.81 MONITORING FOR LONG-TERM ANTICOAGULANT USE: ICD-10-CM

## 2024-01-17 DIAGNOSIS — Z79.01 MONITORING FOR LONG-TERM ANTICOAGULANT USE: ICD-10-CM

## 2024-01-17 DIAGNOSIS — M17.12 PRIMARY OSTEOARTHRITIS OF LEFT KNEE: Primary | ICD-10-CM

## 2024-01-17 DIAGNOSIS — D57.00 HB-SS DISEASE WITH CRISIS (HCC): ICD-10-CM

## 2024-01-17 DIAGNOSIS — D57.20 SICKLE CELL DISEASE, TYPE SC, WITHOUT CRISIS (HCC): Primary | ICD-10-CM

## 2024-01-17 LAB
INR: 3.3 (ref 2–3)
TEST STRIP EXPIRATION DATE: ABNORMAL DATE

## 2024-01-17 PROCEDURE — 85610 PROTHROMBIN TIME: CPT | Performed by: FAMILY MEDICINE

## 2024-01-17 PROCEDURE — 93793 ANTICOAG MGMT PT WARFARIN: CPT | Performed by: FAMILY MEDICINE

## 2024-01-17 RX ORDER — TRIAMCINOLONE ACETONIDE 40 MG/ML
40 INJECTION, SUSPENSION INTRA-ARTICULAR; INTRAMUSCULAR ONCE
Status: COMPLETED | OUTPATIENT
Start: 2024-01-17 | End: 2024-01-17

## 2024-01-17 RX ADMIN — TRIAMCINOLONE ACETONIDE 40 MG: 40 INJECTION, SUSPENSION INTRA-ARTICULAR; INTRAMUSCULAR at 15:41:00

## 2024-01-17 NOTE — PROGRESS NOTES
NURSING INTAKE COMMENTS:   Chief Complaint   Patient presents with    Knee Pain     L knee f/u- had injection on on 2023 that helped for few mos- pt stated there's fluid build up- rates pain 5-10/10 all the time       HPI: This 63 year old female presents today with complaints of left knee pain and swelling.  She had relief following her last aspiration and cortisone injection for several months.    Past Medical History:   Diagnosis Date    Asthma     Sickle cell anemia (HCC)      Past Surgical History:   Procedure Laterality Date          COLONOSCOPY  2008    COLONOSCOPY SCREENING - REFERRAL N/A 10/20/2022    Procedure: COLONOSCOPY-SCREENING   ESOPHAGOGASTRODUODENOSCOPY (EGD);  Surgeon: Migel Hardwick MD;  Location: [facility] ENDOSCOPY    HYSTERECTOMY      in her 50's    LAPAROSCOPY,DIAGNOSTIC      NECK/CHEST PROCEDURE UNLISTED      OTHER SURGICAL HISTORY      gallbladder    REPAIR ROTATOR CUFF,ACUTE      right arm     Current Outpatient Medications   Medication Sig Dispense Refill    HYDROcodone-acetaminophen  MG Oral Tab Half to one tablet every four hours as needed for pain 90 tablet 0    lidocaine 5 % External Patch PLACE 2 PATCHES ONTO THE SKIN AT NIGHT PLACE FOR 12 HOURS ON AND 12 HOURS OFF 60 patch 2    amLODIPine Besy-Benazepril HCl 10-20 MG Oral Cap Take 1 capsule by mouth daily. 90 capsule 1    folic acid 1 MG Oral Tab Take 1 tablet (1 mg total) by mouth daily. 90 tablet 3    warfarin 5 MG Oral Tab Take 1-1.5 tablets (5-7.5 mg total) by mouth nightly. Take as directed by the coumadin clinic. Take one tablet (5 mg) four nights a week. Take one and a half tablets (7.5 mg) three nights a week. 120 tablet 1    Oxybutynin Chloride ER 15 MG Oral Tablet 24 Hr Take 1 tablet (15 mg total) by mouth daily. Take with 1 tablet (5mg )daily for total of 20 mg daily. 90 tablet 3    fluticasone propionate 50 MCG/ACT Nasal Suspension 2 sprays by Nasal route daily. 48 mL 3    albuterol 108 (90 Base)  MCG/ACT Inhalation Aero Soln Inhale 2 puffs into the lungs every 4 (four) hours as needed. 42.5 each 1    Calcium Citrate-Vitamin D 315-250 MG-UNIT Oral Tab Take 1 tablet by mouth daily.      Vitamin D3, Cholecalciferol, 10 MCG (400 UNIT) Oral Tab Take 2.5 tablets (1,000 Units total) by mouth daily.       Allergies   Allergen Reactions    Codeine PALPITATIONS    Morphine PALPITATIONS    Opioid Analgesics SHORTNESS OF BREATH     heart races with the use of codeine  Itching and tachycardia      Sulfa Antibiotics SHORTNESS OF BREATH     Rash and tongue swells, itching  Other reaction(s): Swelling - oral/pharyngeal    Dust Mite Extract OTHER (SEE COMMENTS)     Stuffy nose, HA     Family History   Problem Relation Age of Onset    Breast Cancer Mother 56    Heart Disease Father     Dementia Father     Sickle Cell Sister         SC    Heart Attack Brother     Dementia Maternal Grandmother     Stroke Maternal Grandfather     Prostate Cancer Brother 50    Ovarian Cancer Sister     Breast Cancer Sister 73    Dementia Sister     Breast Cancer Maternal Aunt 68    Other (breast lump removed) Maternal Aunt        Social History     Occupational History    Not on file   Tobacco Use    Smoking status: Never    Smokeless tobacco: Never   Vaping Use    Vaping Use: Never used   Substance and Sexual Activity    Alcohol use: Never    Drug use: Never    Sexual activity: Not on file        Review of Systems:  GENERAL: feels generally well, no significant weight loss or weight gain  SKIN: no ulcerated or worrisome skin lesions  EYES:denies blurred vision or double vision  HEENT: denies new nasal congestion, sinus pain or ST  LUNGS: denies shortness of breath  CARDIOVASCULAR: denies chest pain  GI: no hematemesis, no worsening heartburn, no diarrhea  : no dysuria, no blood in urine, no difficulty urinating, no incontinence  MUSCULOSKELETAL: no other musculoskeletal complaints other than in HPI  NEURO: no numbness or tingling, no weakness  or balance disorder  PSYCHE: no depression or anxiety  HEMATOLOGIC: no hx of blood dyscrasia  ENDOCRINE: no thyroid or diabetes issues  ALL/ASTHMA: no new hx of severe allergy or asthma    Physical Examination:    /75   Pulse 63   Ht 5' 2\" (1.575 m)   Wt 203 lb 6.4 oz (92.3 kg)   BMI 37.20 kg/m²   Constitutional: appears well hydrated, alert and responsive, no acute distress noted  Extremities: Moderate effusion left knee.  Full extension 120s of flexion.  No instability.      Imaging: No results found.     Lab Results   Component Value Date    WBC 8.0 07/12/2023    HGB 10.6 (L) 07/12/2023    .0 07/12/2023      Lab Results   Component Value Date     (H) 08/26/2022    BUN 9 08/26/2022    CREATSERUM 0.87 08/26/2022    GFRNAA 64 04/25/2022    GFRAA 74 04/25/2022        Assessment and Plan:  Diagnoses and all orders for this visit:    Primary osteoarthritis of left knee  -     arthrocentesis major joint  -     triamcinolone acetonide (Kenalog-40) 40 MG/ML injection 40 mg        Assessment: Recurrent effusion of the left knee secondary to osteoarthritis.  Procedure: The risks and benefits of a cortisone injection were discussed with the patient.  An informational sheet was also provided and the patient had ample time to review it.  Under sterile preparation, the left knee was injected with 40 mg of Kenalog and 4 cc 0.5% marcaine.  The patient tolerated the procedure well.      Plan: Recommended weight reduction and aspirated and injected the knee with cortisone.  She will follow-up as needed.  I also recommended low impact exercise program.    The above note was creating using Dragon speech recognition technology. Please excuse any typos.    NAHEED SAHU MD

## 2024-01-17 NOTE — PROGRESS NOTES
Face-to-Face  / INR 3.3,  therapeutic. (Goal 3.0 ) TTR 59.3 %     Etiology: better. Previously pt went back to her previous dose. Corrected and this is much better.    PLAN: continue dose a listed.    Recheck INR 4 weeks    Pt reports:    No sign of unusual bruising or bleeding.  Any missed doses: No   Medications changes: No    Welling verbalized understanding and agreement.    WARFARIN Plan per protocol: 5 mg every Mon, Wed, Fri; 7.5 mg all other days

## 2024-01-17 NOTE — PROGRESS NOTES
Per verbal order from Dr. Perez, draw up and 4ml of 0.5% Marcaine and 1ml of Kenalog 40 for injection into left knee.Kalie SOORIO MA  Patient provided education handout for cortisone injection.

## 2024-01-22 DIAGNOSIS — Z51.81 ENCOUNTER FOR MEDICATION MONITORING: ICD-10-CM

## 2024-01-22 DIAGNOSIS — D57.20 SICKLE CELL DISEASE, TYPE SC, WITHOUT CRISIS (HCC): Primary | ICD-10-CM

## 2024-01-23 ENCOUNTER — NURSE ONLY (OUTPATIENT)
Dept: HEMATOLOGY/ONCOLOGY | Facility: HOSPITAL | Age: 64
End: 2024-01-23
Attending: INTERNAL MEDICINE
Payer: COMMERCIAL

## 2024-01-23 VITALS
OXYGEN SATURATION: 95 % | SYSTOLIC BLOOD PRESSURE: 137 MMHG | HEART RATE: 72 BPM | DIASTOLIC BLOOD PRESSURE: 67 MMHG | HEIGHT: 62.5 IN | WEIGHT: 202 LBS | TEMPERATURE: 98 F | RESPIRATION RATE: 16 BRPM | BODY MASS INDEX: 36.24 KG/M2

## 2024-01-23 DIAGNOSIS — Z79.891 LONG TERM (CURRENT) USE OF OPIATE ANALGESIC: ICD-10-CM

## 2024-01-23 DIAGNOSIS — G89.29 OTHER CHRONIC PAIN: ICD-10-CM

## 2024-01-23 DIAGNOSIS — Z51.81 ENCOUNTER FOR MONITORING OPIOID MAINTENANCE THERAPY: ICD-10-CM

## 2024-01-23 DIAGNOSIS — D57.20 SICKLE CELL DISEASE, TYPE SC, WITHOUT CRISIS (HCC): Primary | ICD-10-CM

## 2024-01-23 DIAGNOSIS — Z51.81 ENCOUNTER FOR MEDICATION MONITORING: ICD-10-CM

## 2024-01-23 DIAGNOSIS — D57.20 SICKLE CELL DISEASE, TYPE SC, WITHOUT CRISIS (HCC): ICD-10-CM

## 2024-01-23 DIAGNOSIS — Z79.891 ENCOUNTER FOR MONITORING OPIOID MAINTENANCE THERAPY: ICD-10-CM

## 2024-01-23 LAB
BASOPHILS # BLD AUTO: 0.04 X10(3) UL (ref 0–0.2)
BASOPHILS NFR BLD AUTO: 0.4 %
DEPRECATED RDW RBC AUTO: 49.8 FL (ref 35.1–46.3)
EOSINOPHIL # BLD AUTO: 0.15 X10(3) UL (ref 0–0.7)
EOSINOPHIL NFR BLD AUTO: 1.4 %
ERYTHROCYTE [DISTWIDTH] IN BLOOD BY AUTOMATED COUNT: 16.5 % (ref 11–15)
HCT VFR BLD AUTO: 32.8 %
HGB BLD-MCNC: 11.6 G/DL
HGB RETIC QN AUTO: 34.3 PG (ref 28.2–36.6)
IMM GRANULOCYTES # BLD AUTO: 0.05 X10(3) UL (ref 0–1)
IMM GRANULOCYTES NFR BLD: 0.5 %
IMM RETICS NFR: 0.34 RATIO (ref 0.1–0.3)
LYMPHOCYTES # BLD AUTO: 2.87 X10(3) UL (ref 1–4)
LYMPHOCYTES NFR BLD AUTO: 26.5 %
MCH RBC QN AUTO: 30.3 PG (ref 26–34)
MCHC RBC AUTO-ENTMCNC: 35.4 G/DL (ref 31–37)
MCV RBC AUTO: 85.6 FL
MONOCYTES # BLD AUTO: 1.22 X10(3) UL (ref 0.1–1)
MONOCYTES NFR BLD AUTO: 11.2 %
NEUTROPHILS # BLD AUTO: 6.52 X10 (3) UL (ref 1.5–7.7)
NEUTROPHILS # BLD AUTO: 6.52 X10(3) UL (ref 1.5–7.7)
NEUTROPHILS NFR BLD AUTO: 60 %
PLATELET # BLD AUTO: 386 10(3)UL (ref 150–450)
PLATELET MORPHOLOGY: NORMAL
RBC # BLD AUTO: 3.83 X10(6)UL
RETICS # AUTO: 170.1 X10(3) UL (ref 22.5–147.5)
RETICS/RBC NFR AUTO: 4.4 %
WBC # BLD AUTO: 10.9 X10(3) UL (ref 4–11)

## 2024-01-23 PROCEDURE — 99214 OFFICE O/P EST MOD 30 MIN: CPT | Performed by: INTERNAL MEDICINE

## 2024-01-23 PROCEDURE — 36415 COLL VENOUS BLD VENIPUNCTURE: CPT

## 2024-01-23 PROCEDURE — 85045 AUTOMATED RETICULOCYTE COUNT: CPT

## 2024-01-23 PROCEDURE — 85025 COMPLETE CBC W/AUTO DIFF WBC: CPT

## 2024-01-23 NOTE — PROGRESS NOTES
HPI   Daphne Reynoso is a 63 year old female here for follow up of Sickle cell disease, type sc, without crisis (HCC)    Long term (current) use of opiate analgesic    Encounter for monitoring opioid maintenance therapy    Other chronic pain.      Had IV fluids on November of 2023.  States keeping hydrated at home.    States back pain today on the lower back 10/10.  Taking norco 10mg 2 a day and tylenol in between.      She is also using salonpas lidocaine patches during the day too.      Has not tried Voltaren gel.      Review of Systems:   Review of Systems   Constitutional:  Positive for fatigue (some days good, others bad.). Negative for appetite change and unexpected weight change.   HENT:           No URI symptoms   Respiratory:  Negative for cough and shortness of breath (uses inhaler as needed.).    Cardiovascular:  Negative for chest pain and palpitations.   Gastrointestinal:  Negative for abdominal pain and constipation.   Musculoskeletal:  Positive for arthralgias and back pain.        No other bone pain today.   Neurological:  Negative for dizziness and headaches.   Hematological:  Bruises/bleeds easily.   Psychiatric/Behavioral:  Negative for sleep disturbance (LUIS ARMANDO, off CPAP due to recall.).            Current Outpatient Medications   Medication Sig Dispense Refill    HYDROcodone-acetaminophen  MG Oral Tab Half to one tablet every four hours as needed for pain 90 tablet 0    lidocaine 5 % External Patch PLACE 2 PATCHES ONTO THE SKIN AT NIGHT PLACE FOR 12 HOURS ON AND 12 HOURS OFF 60 patch 2    amLODIPine Besy-Benazepril HCl 10-20 MG Oral Cap Take 1 capsule by mouth daily. 90 capsule 1    folic acid 1 MG Oral Tab Take 1 tablet (1 mg total) by mouth daily. 90 tablet 3    warfarin 5 MG Oral Tab Take 1-1.5 tablets (5-7.5 mg total) by mouth nightly. Take as directed by the coumadin clinic. Take one tablet (5 mg) four nights a week. Take one and a half tablets (7.5 mg) three nights a week. 120 tablet  1    Oxybutynin Chloride ER 15 MG Oral Tablet 24 Hr Take 1 tablet (15 mg total) by mouth daily. Take with 1 tablet (5mg )daily for total of 20 mg daily. 90 tablet 3    fluticasone propionate 50 MCG/ACT Nasal Suspension 2 sprays by Nasal route daily. 48 mL 3    albuterol 108 (90 Base) MCG/ACT Inhalation Aero Soln Inhale 2 puffs into the lungs every 4 (four) hours as needed. 42.5 each 1    Calcium Citrate-Vitamin D 315-250 MG-UNIT Oral Tab Take 1 tablet by mouth daily.      Vitamin D3, Cholecalciferol, 10 MCG (400 UNIT) Oral Tab Take 2.5 tablets (1,000 Units total) by mouth daily.       Allergies:   Allergies   Allergen Reactions    Codeine PALPITATIONS    Morphine PALPITATIONS    Opioid Analgesics SHORTNESS OF BREATH     heart races with the use of codeine  Itching and tachycardia      Sulfa Antibiotics SHORTNESS OF BREATH     Rash and tongue swells, itching  Other reaction(s): Swelling - oral/pharyngeal    Dust Mite Extract OTHER (SEE COMMENTS)     Stuffy nose, HA       Past Medical History:   Diagnosis Date    Asthma     Sickle cell anemia (HCC)      Past Surgical History:   Procedure Laterality Date          COLONOSCOPY  2008    COLONOSCOPY SCREENING - REFERRAL N/A 10/20/2022    Procedure: COLONOSCOPY-SCREENING   ESOPHAGOGASTRODUODENOSCOPY (EGD);  Surgeon: Migel Hardwick MD;  Location: [facility] ENDOSCOPY    HYSTERECTOMY      in her 50's    LAPAROSCOPY,DIAGNOSTIC      NECK/CHEST PROCEDURE UNLISTED      OTHER SURGICAL HISTORY      gallbladder    REPAIR ROTATOR CUFF,ACUTE      right arm     Social History     Socioeconomic History    Marital status:    Tobacco Use    Smoking status: Never    Smokeless tobacco: Never   Vaping Use    Vaping Use: Never used   Substance and Sexual Activity    Alcohol use: Never    Drug use: Never       Family History   Problem Relation Age of Onset    Breast Cancer Mother 56    Heart Disease Father     Dementia Father     Sickle Cell Sister         SC    Heart Attack  Brother     Dementia Maternal Grandmother     Stroke Maternal Grandfather     Prostate Cancer Brother 50    Ovarian Cancer Sister     Breast Cancer Sister 73    Dementia Sister     Breast Cancer Maternal Aunt 68    Other (breast lump removed) Maternal Aunt          PHYSICAL EXAM:    /67 (BP Location: Left arm, Patient Position: Sitting, Cuff Size: large)   Pulse 72   Temp 98.1 °F (36.7 °C) (Oral)   Resp 16   Ht 1.588 m (5' 2.5\")   Wt 91.6 kg (202 lb)   SpO2 95%   BMI 36.36 kg/m²   Wt Readings from Last 6 Encounters:   01/23/24 91.6 kg (202 lb)   01/17/24 92.3 kg (203 lb 6.4 oz)   01/10/24 91.6 kg (202 lb)   11/28/23 92.5 kg (204 lb)   11/15/23 91.9 kg (202 lb 11.2 oz)   11/14/23 91.3 kg (201 lb 4.8 oz)     Physical Exam  General: Patient is alert, not in acute distress.  HEENT: EOMs intact. PERRL. Non icteric. R scalp with small lipoma.  Neck: No JVD. No palpable lymphadenopathy. Neck is supple.  Chest: Clear to auscultation.  Heart: Regular rate and rhythm.   Abdomen: Soft, non tender with good bowel sounds. Mesh at umbilicus, palpable area described by patient is a stria.   Extremities: No edema.  LLE with edema and effusion.  Neurological: Grossly intact.   Lymphatics: There is no palpable lymphadenopathy throughout in the cervical, supraclavicular, axillary, or inguinal regions.  Psych/Depression: nl        ASSESSMENT/PLAN:     Encounter Diagnoses   Name Primary?    Sickle cell disease, type sc, without crisis (HCC) Yes    Long term (current) use of opiate analgesic     Encounter for monitoring opioid maintenance therapy     Other chronic pain        1) Sickle cell disease type SC  --very well managed with very few crisis and seldom hospitalizations  --Continue with prn have prn IVF hydration when she has crisis that can be managed outpatient.    --PRN norco for pain.   --Folic acid daily.  --F/u with labs every 6 months and prn.    --L knee effusion to be evaluated by ortho.    No orders of the  defined types were placed in this encounter.    MDM moderate    Results From Past 48 Hours:  Recent Results (from the past 48 hour(s))   RETICULOCYTE COUNT [E]    Collection Time: 01/23/24 10:43 AM   Result Value Ref Range    Retic% 4.4 (H) 0.5 - 2.5 %    Retic Absolute 170.1 (H) 22.5 - 147.5 x10(3) uL    Retic IRF 0.337 (H) 0.100 - 0.300 Ratio    Reticulocyte Hemoglobin Equivalent 34.3 28.2 - 36.6 pg   CBC W/ DIFFERENTIAL    Collection Time: 01/23/24 10:43 AM   Result Value Ref Range    WBC 10.9 4.0 - 11.0 x10(3) uL    RBC 3.83 3.80 - 5.30 x10(6)uL    HGB 11.6 (L) 12.0 - 16.0 g/dL    HCT 32.8 (L) 35.0 - 48.0 %    MCV 85.6 80.0 - 100.0 fL    MCH 30.3 26.0 - 34.0 pg    MCHC 35.4 31.0 - 37.0 g/dL    RDW-SD 49.8 (H) 35.1 - 46.3 fL    RDW 16.5 (H) 11.0 - 15.0 %    .0 150.0 - 450.0 10(3)uL    Neutrophil Absolute Prelim 6.52 1.50 - 7.70 x10 (3) uL

## 2024-01-29 RX ORDER — WARFARIN SODIUM 5 MG/1
TABLET ORAL
Qty: 120 TABLET | Refills: 0 | Status: SHIPPED | OUTPATIENT
Start: 2024-01-29

## 2024-01-29 NOTE — TELEPHONE ENCOUNTER
Patient states that she has run out of the Warfarin medication.     Current Outpatient Medications   Medication Sig Dispense Refill                                warfarin 5 MG Oral Tab Take 1-1.5 tablets (5-7.5 mg total) by mouth nightly. Take as directed by the coumadin clinic. Take one tablet (5 mg) four nights a week. Take one and a half tablets (7.5 mg) three nights a week. 120 tablet 1

## 2024-01-29 NOTE — TELEPHONE ENCOUNTER
WARFARIN Plan per protocol: 5 mg every Mon, Wed, Fri; 7.5 mg all other days    Warfarin protocol failed. MD wrote order 19 days ago.

## 2024-01-30 NOTE — TELEPHONE ENCOUNTER
Patient need a refill on her Norco  mg oral tab Quality 130: Documentation Of Current Medications In The Medical Record: Current Medications Documented Quality 402: Tobacco Use And Help With Quitting Among Adolescents: Patient screened for tobacco and never smoked Quality 431: Preventive Care And Screening: Unhealthy Alcohol Use - Screening: Patient not identified as an unhealthy alcohol user when screened for unhealthy alcohol use using a systematic screening method Quality 110: Preventive Care And Screening: Influenza Immunization: Influenza Immunization not Administered because Patient Refused. Detail Level: Detailed

## 2024-02-13 ENCOUNTER — ANTI-COAG VISIT (OUTPATIENT)
Dept: ANTICOAGULATION | Facility: CLINIC | Age: 64
End: 2024-02-13

## 2024-02-13 DIAGNOSIS — Z51.81 MONITORING FOR LONG-TERM ANTICOAGULANT USE: ICD-10-CM

## 2024-02-13 DIAGNOSIS — D57.20 SICKLE CELL DISEASE, TYPE SC, WITHOUT CRISIS (HCC): Primary | ICD-10-CM

## 2024-02-13 DIAGNOSIS — D57.00 HB-SS DISEASE WITH CRISIS (HCC): ICD-10-CM

## 2024-02-13 DIAGNOSIS — Z79.01 MONITORING FOR LONG-TERM ANTICOAGULANT USE: ICD-10-CM

## 2024-02-13 LAB — INR: 3.6 (ref 0.8–1.2)

## 2024-02-13 PROCEDURE — 85610 PROTHROMBIN TIME: CPT | Performed by: FAMILY MEDICINE

## 2024-02-13 PROCEDURE — 93793 ANTICOAG MGMT PT WARFARIN: CPT | Performed by: FAMILY MEDICINE

## 2024-02-13 RX ORDER — AMLODIPINE BESYLATE AND BENAZEPRIL HYDROCHLORIDE 10; 20 MG/1; MG/1
1 CAPSULE ORAL DAILY
Qty: 90 CAPSULE | Refills: 1 | Status: SHIPPED | OUTPATIENT
Start: 2024-02-13

## 2024-02-13 NOTE — TELEPHONE ENCOUNTER
Please review.  Protocol failed / Has no protocol.     Requested Prescriptions   Pending Prescriptions Disp Refills    AMLODIPINE BESY-BENAZEPRIL HCL 10-20 MG Oral Cap [Pharmacy Med Name: AMLODIPINE-BENAZEPRIL 10-20 MG] 90 capsule 1     Sig: TAKE 1 CAPSULE BY MOUTH EVERY DAY       Hypertension Medications Protocol Failed - 2/12/2024 12:28 AM        Failed - CMP or BMP in past 12 months        Passed - Last BP reading less than 140/90     BP Readings from Last 1 Encounters:   01/23/24 137/67               Passed - In person appointment or virtual visit in the past 12 mos or appointment in next 3 mos     Recent Outpatient Visits              3 weeks ago Sickle cell disease, type sc, without crisis (HCC)    [facility] Matthew Chase MD    Office Visit    3 weeks ago Sickle cell disease, type sc, without crisis (HCC)    [facility] Hematology Oncology    Nurse Only    3 weeks ago Primary osteoarthritis of left knee    [facility] Osmani Perez MD    Office Visit    1 month ago Screening mammogram for breast cancer    [facility] Radha Ramos MD    Office Visit    2 months ago Dysuria    [facility] Flaco Parker MD    Office Visit          Future Appointments         Provider Department Appt Notes    In 4 weeks Kim Alejandre RN [facility]     In 1 month Radha Ramos MD [facility] 3 month               Passed - EGFRCR or GFRAA > 50     GFR Evaluation  EGFRCR: 64 , resulted on 7/28/2023             Future Appointments         Provider Department Appt Notes    In 4 weeks Kim Alejandre RN [facility]     In 1 month Radha Ramos MD [facility] 3 month            Recent Outpatient Visits              3 weeks ago Sickle cell disease, type sc, without crisis (HCC)    [facility]ematology Oncology Matthew Chase MD    Office Visit    3 weeks ago Sickle cell disease, type sc, without crisis (HCC)    [facility]emat[facility] Oncology    Nurse Only    3 weeks ago Primary osteoarthritis of left knee    [facility]    [place]    1 month ago Screening mammogram for breast cancer    [facility], Radha Santana MD    Office Visit    2 months ago Dysuria    [facility]    [place] Visit

## 2024-02-13 NOTE — PROGRESS NOTES
Face to face.     Denies any change to diet/meds. INR is minimally above therapeutic goal range.     Per protocol, continue current dose and recheck INR in 4 weeks.     5 mg every Mon, Wed, Fri; 7.5 mg all other days

## 2024-02-16 DIAGNOSIS — G89.29 OTHER CHRONIC PAIN: ICD-10-CM

## 2024-02-16 DIAGNOSIS — Z79.891 LONG TERM (CURRENT) USE OF OPIATE ANALGESIC: ICD-10-CM

## 2024-02-16 RX ORDER — HYDROCODONE BITARTRATE AND ACETAMINOPHEN 10; 325 MG/1; MG/1
TABLET ORAL
Qty: 90 TABLET | Refills: 0 | Status: SHIPPED | OUTPATIENT
Start: 2024-02-16

## 2024-02-22 ENCOUNTER — APPOINTMENT (OUTPATIENT)
Dept: ULTRASOUND IMAGING | Facility: HOSPITAL | Age: 64
End: 2024-02-22
Attending: EMERGENCY MEDICINE
Payer: COMMERCIAL

## 2024-02-22 ENCOUNTER — APPOINTMENT (OUTPATIENT)
Dept: GENERAL RADIOLOGY | Facility: HOSPITAL | Age: 64
End: 2024-02-22
Attending: EMERGENCY MEDICINE
Payer: COMMERCIAL

## 2024-02-22 ENCOUNTER — HOSPITAL ENCOUNTER (EMERGENCY)
Facility: HOSPITAL | Age: 64
Discharge: HOME | End: 2024-02-22
Attending: EMERGENCY MEDICINE
Payer: COMMERCIAL

## 2024-02-22 VITALS
OXYGEN SATURATION: 96 % | BODY MASS INDEX: 36 KG/M2 | TEMPERATURE: 98 F | WEIGHT: 200 LBS | HEART RATE: 62 BPM | DIASTOLIC BLOOD PRESSURE: 69 MMHG | RESPIRATION RATE: 18 BRPM | SYSTOLIC BLOOD PRESSURE: 122 MMHG

## 2024-02-22 DIAGNOSIS — M25.562 LEFT KNEE PAIN, UNSPECIFIED CHRONICITY: Primary | ICD-10-CM

## 2024-02-22 PROCEDURE — 99284 EMERGENCY DEPT VISIT MOD MDM: CPT

## 2024-02-22 PROCEDURE — 93971 EXTREMITY STUDY: CPT | Performed by: EMERGENCY MEDICINE

## 2024-02-22 PROCEDURE — 73560 X-RAY EXAM OF KNEE 1 OR 2: CPT | Performed by: EMERGENCY MEDICINE

## 2024-02-22 NOTE — ED PROVIDER NOTES
Signout taken with disposition pending US - same without DVT and patient advised of possible Baker's cyst/effusion for which patient with establish ortho and encouraged to followup with same.    US VENOUS DOPPLER LEG LEFT - DIAG IMG (CPT=93971)    Result Date: 2/22/2024  PROCEDURE: US VENOUS DOPPLER LEG LEFT-DIAG IMG (CPT=93971)  COMPARISON: [facility] 2nd Floor, XR KNEE (3 VIEWS), LEFT (CPT=73562), 8/16/2023, 10:48 AM.  [facility], XR KNEE (1 OR 2 VIEWS), LEFT (CPT=73560), 2/22/2024, 2:38 PM.  [facility], US VENOUS DOPPLER  LEG LEFT-DIAG IMG (CPT=93971), 7/08/2023, 1:34 PM.  INDICATIONS: Left lower extremity pain and swelling  TECHNIQUE: Color duplex Doppler venous ultrasound of the left lower extremity was performed in the usual manner.  FINDINGS: The femoral and popliteal veins appear normal.  Normal flow was demonstrated with color and pulsed Doppler.  Visualized portions of the great and small saphenous, posterior tibial, and peroneal veins appear normal.   THROMBI: None visible. COMPRESSIBILITY: Normal. OTHER: Small fluid collection in the popliteal fossa measuring 2.7 x 0.7 x 2.2 cm more likely Baker's cyst.  Additional fluid collection anteriorly more likely part of a joint effusion.  Correlate clinically.         CONCLUSION:  1. No evidence of deep venous thrombosis in the left lower extremity. 2. Probable small Baker's cyst. 3. Anterior fluid collection more likely part of a joint effusion.  Correlate clinically.    Dictated by (CST): Isreal Bullock MD on 2/22/2024 at 4:02 PM     Finalized by (CST): Isreal Bullock MD on 2/22/2024 at 4:06 PM

## 2024-02-22 NOTE — ED INITIAL ASSESSMENT (HPI)
Patient complains of left knee pain for awhile, states she  has seen physician for this, states she has had cortisone shots

## 2024-02-22 NOTE — ED PROVIDER NOTES
Patient Seen in: [facility] Emergency Department      History     Chief Complaint   Patient presents with    Knee Pain     Stated Complaint: Left knee pain    Subjective:   HPI    Patient presents emergency department complaining of left knee pain.  She states that 2 weeks ago she had a cortisone injection performed by her orthopedist and she has had pain since then.  She states occasionally she feels like the knee is going to give out on her.  It is painful when she ambulates up and down stairs.  There is no fever, warmth, redness to the area.    Objective:   Past Medical History:   Diagnosis Date    Asthma (HCC)     Sickle cell anemia (HCC)               Past Surgical History:   Procedure Laterality Date          COLONOSCOPY  2008    COLONOSCOPY SCREENING - REFERRAL N/A 10/20/2022    Procedure: COLONOSCOPY-SCREENING   ESOPHAGOGASTRODUODENOSCOPY (EGD);  Surgeon: Migel Hardwick MD;  Location: [facility] ENDOSCOPY    HYSTERECTOMY      in her 50's    LAPAROSCOPY,DIAGNOSTIC      NECK/CHEST PROCEDURE UNLISTED      OTHER SURGICAL HISTORY      gallbladder    REPAIR ROTATOR CUFF,ACUTE      right arm                Social History     Socioeconomic History    Marital status:    Tobacco Use    Smoking status: Never    Smokeless tobacco: Never   Vaping Use    Vaping Use: Never used   Substance and Sexual Activity    Alcohol use: Never    Drug use: Never              Review of Systems    Positive for stated complaint: Left knee pain  Other systems are as noted in HPI.  Constitutional and vital signs reviewed.      All other systems reviewed and negative except as noted above.    Physical Exam     ED Triage Vitals   BP 24 1215 149/87   Pulse 24 1215 80   Resp 24 1215 18   Temp 24 1215 98 °F (36.7 °C)   Temp src --    SpO2 24 1215 98 %   O2 Device 24 1643 None (Room air)       Current:/69   Pulse 62   Temp 98 °F (36.7 °C)   Resp 18   Wt 90.7 kg   SpO2 96%    BMI 36.00 kg/m²         Physical Exam  Vitals and nursing note reviewed.   Constitutional:       General: She is not in acute distress.     Appearance: She is well-developed.   HENT:      Head: Normocephalic.      Nose: Nose normal.      Mouth/Throat:      Mouth: Mucous membranes are moist.   Eyes:      Conjunctiva/sclera: Conjunctivae normal.   Cardiovascular:      Rate and Rhythm: Normal rate and regular rhythm.      Heart sounds: No murmur heard.  Pulmonary:      Effort: Pulmonary effort is normal. No respiratory distress.      Breath sounds: Normal breath sounds.   Abdominal:      General: There is no distension.      Palpations: Abdomen is soft.      Tenderness: There is no abdominal tenderness.   Musculoskeletal:      Cervical back: Normal range of motion and neck supple.      Comments: The left lower extremity was examined.  There is mild tenderness of the left calf with palpation.  The injection site was evaluated in the neuro is no evidence of fluctuance, erythema or warmth.  Full range of motion of the knee is intact passively and actively.  Strong pulses are noted in the foot and ankle with normal sensation to light touch.   Skin:     General: Skin is warm and dry.      Capillary Refill: Capillary refill takes less than 2 seconds.      Findings: No rash.   Neurological:      General: No focal deficit present.      Mental Status: She is alert and oriented to person, place, and time.               ED Course   Labs Reviewed - No data to display                   MDM                           Medical Decision Making  Differential diagnosis considered for DVT, fracture, contusion, sprain.    Amount and/or Complexity of Data Reviewed  Radiology: ordered and independent interpretation performed. Decision-making details documented in ED Course.     Details: X-ray shows no fracture or dislocation.  Ultrasound shows no DVT.  Discussion of management or test interpretation with external provider(s): I recommended  the patient follow-up with her orthopedic surgeon who performed her joint injection weeks ago as she may have an internal derangement issue not improving with steroid therapy and may need further imaging such as MRI.        Disposition and Plan     Clinical Impression:  1. Left knee pain, unspecified chronicity         Disposition:  Discharge  2/22/2024  4:44 pm    Follow-up:  Osmani Perez MD  [address] [place] [id number]  [phone]    Schedule an appointment as soon as possible for a visit            Medications Prescribed:  Discharge Medication List as of 2/22/2024  4:45 PM

## 2024-03-05 ENCOUNTER — TELEPHONE (OUTPATIENT)
Dept: ORTHOPEDICS CLINIC | Facility: CLINIC | Age: 64
End: 2024-03-05

## 2024-03-05 NOTE — TELEPHONE ENCOUNTER
Nobody spoke to me. You can give her my next open appointment, or she can see Giovani if the wait to see me is too long.

## 2024-03-06 ENCOUNTER — OFFICE VISIT (OUTPATIENT)
Dept: ORTHOPEDICS CLINIC | Facility: CLINIC | Age: 64
End: 2024-03-06

## 2024-03-06 VITALS — DIASTOLIC BLOOD PRESSURE: 72 MMHG | SYSTOLIC BLOOD PRESSURE: 121 MMHG | HEART RATE: 76 BPM

## 2024-03-06 DIAGNOSIS — M25.562 CHRONIC PAIN OF LEFT KNEE: Primary | ICD-10-CM

## 2024-03-06 DIAGNOSIS — G89.29 CHRONIC PAIN OF LEFT KNEE: Primary | ICD-10-CM

## 2024-03-06 DIAGNOSIS — M17.12 PRIMARY OSTEOARTHRITIS OF LEFT KNEE: ICD-10-CM

## 2024-03-06 LAB
BASOPHILS NFR SNV: 0 %
COLOR FLD: YELLOW
EOSINOPHIL NFR SNV: 0 %
LYMPHOCYTES NFR SNV: 3 %
MONOS+MACROS NFR SNV: 78 %
NEUTROPHILS NFR FLD: 19 %
RBC # FLD AUTO: 235 /CUMM (ref ?–1)
TOTAL CELLS COUNTED FLD: 100
TOTAL CELLS COUNTED SNV: 408 /CUMM (ref 0–200)
TURBIDITY CSF QL: CLEAR
WBC # SNV: 408 /CUMM

## 2024-03-06 PROCEDURE — 99243 OFF/OP CNSLTJ NEW/EST LOW 30: CPT | Performed by: ORTHOPAEDIC SURGERY

## 2024-03-06 PROCEDURE — 3074F SYST BP LT 130 MM HG: CPT | Performed by: ORTHOPAEDIC SURGERY

## 2024-03-06 PROCEDURE — 3078F DIAST BP <80 MM HG: CPT | Performed by: ORTHOPAEDIC SURGERY

## 2024-03-06 PROCEDURE — 20610 DRAIN/INJ JOINT/BURSA W/O US: CPT | Performed by: ORTHOPAEDIC SURGERY

## 2024-03-06 RX ORDER — TRIAMCINOLONE ACETONIDE 40 MG/ML
40 INJECTION, SUSPENSION INTRA-ARTICULAR; INTRAMUSCULAR ONCE
Status: COMPLETED | OUTPATIENT
Start: 2024-03-06 | End: 2024-03-06

## 2024-03-06 RX ADMIN — TRIAMCINOLONE ACETONIDE 40 MG: 40 INJECTION, SUSPENSION INTRA-ARTICULAR; INTRAMUSCULAR at 17:36:00

## 2024-03-06 NOTE — TELEPHONE ENCOUNTER
Called pt and she states last visit cortisone injection on 1/17/24 didn't help the pain.  Pt went to ER on 2/22/24 d/t pain. We had a cancellation today and offered pt appt today at 3:30pm with Dr Perez and she accepted.

## 2024-03-06 NOTE — PROGRESS NOTES
NURSING INTAKE COMMENTS:   Chief Complaint   Patient presents with    Knee Pain     L knee - had injection on 2024 that only helped for a few hours - pt went to ER on 2024 due to knee pain has L leg US and xray in the system- rates pain 10/10 all the time- has swelling       HPI: This 63 year old female presents today with complaints of pain and swelling in the left knee.  She did not get much relief from cortisone injection previously.  During the interim she was seen in the emergency room where x-rays and ultrasound were done.    Past Medical History:   Diagnosis Date    Asthma (HCC)     Sickle cell anemia (HCC)      Past Surgical History:   Procedure Laterality Date          COLONOSCOPY  2008    COLONOSCOPY SCREENING - REFERRAL N/A 10/20/2022    Procedure: COLONOSCOPY-SCREENING   ESOPHAGOGASTRODUODENOSCOPY (EGD);  Surgeon: Migel Hardwick MD;  Location: [facility] ENDOSCOPY    HYSTERECTOMY      in her 50's    LAPAROSCOPY,DIAGNOSTIC      NECK/CHEST PROCEDURE UNLISTED      OTHER SURGICAL HISTORY      gallbladder    REPAIR ROTATOR CUFF,ACUTE      right arm     Current Outpatient Medications   Medication Sig Dispense Refill    HYDROcodone-acetaminophen  MG Oral Tab Half to one tablet every four hours as needed for pain 90 tablet 0    amLODIPine Besy-Benazepril HCl 10-20 MG Oral Cap Take 1 capsule by mouth daily. 90 capsule 1    warfarin 5 MG Oral Tab Take as directed by the INR clinic or take 1 tablet , take 1 & 1/2 tablets all other days. 120 tablet 0    lidocaine 5 % External Patch PLACE 2 PATCHES ONTO THE SKIN AT NIGHT PLACE FOR 12 HOURS ON AND 12 HOURS OFF 60 patch 2    folic acid 1 MG Oral Tab Take 1 tablet (1 mg total) by mouth daily. 90 tablet 3    Oxybutynin Chloride ER 15 MG Oral Tablet 24 Hr Take 1 tablet (15 mg total) by mouth daily. Take with 1 tablet (5mg )daily for total of 20 mg daily. 90 tablet 3    fluticasone propionate 50 MCG/ACT Nasal Suspension 2  sprays by Nasal route daily. 48 mL 3    albuterol 108 (90 Base) MCG/ACT Inhalation Aero Soln Inhale 2 puffs into the lungs every 4 (four) hours as needed. 42.5 each 1    Calcium Citrate-Vitamin D 315-250 MG-UNIT Oral Tab Take 1 tablet by mouth daily.      Vitamin D3, Cholecalciferol, 10 MCG (400 UNIT) Oral Tab Take 2.5 tablets (1,000 Units total) by mouth daily.       Allergies   Allergen Reactions    Codeine PALPITATIONS    Morphine PALPITATIONS    Opioid Analgesics SHORTNESS OF BREATH     heart races with the use of codeine  Itching and tachycardia      Sulfa Antibiotics SHORTNESS OF BREATH     Rash and tongue swells, itching  Other reaction(s): Swelling - oral/pharyngeal    Dust Mite Extract OTHER (SEE COMMENTS)     Stuffy nose, HA     Family History   Problem Relation Age of Onset    Breast Cancer Mother 56    Heart Disease Father     Dementia Father     Sickle Cell Sister         SC    Heart Attack Brother     Dementia Maternal Grandmother     Stroke Maternal Grandfather     Prostate Cancer Brother 50    Ovarian Cancer Sister     Breast Cancer Sister 73    Dementia Sister     Breast Cancer Maternal Aunt 68    Other (breast lump removed) Maternal Aunt        Social History     Occupational History    Not on file   Tobacco Use    Smoking status: Never    Smokeless tobacco: Never   Vaping Use    Vaping Use: Never used   Substance and Sexual Activity    Alcohol use: Never    Drug use: Never    Sexual activity: Not on file        Review of Systems:  GENERAL: feels generally well, no significant weight loss or weight gain  SKIN: no ulcerated or worrisome skin lesions  EYES:denies blurred vision or double vision  HEENT: denies new nasal congestion, sinus pain or ST  LUNGS: denies shortness of breath  CARDIOVASCULAR: denies chest pain  GI: no hematemesis, no worsening heartburn, no diarrhea  : no dysuria, no blood in urine, no difficulty urinating, no incontinence  MUSCULOSKELETAL: no other musculoskeletal complaints  other than in HPI  NEURO: no numbness or tingling, no weakness or balance disorder  PSYCHE: no depression or anxiety  HEMATOLOGIC: no hx of blood dyscrasia  ENDOCRINE: no thyroid or diabetes issues  ALL/ASTHMA: no new hx of severe allergy or asthma    Physical Examination:    There were no vitals taken for this visit.  Constitutional: appears well hydrated, alert and responsive, no acute distress noted  Extremities: Large effusion in the left knee.  Mild warmth.  No erythema or cellulitis.  Pain with flexion beyond 110 degrees.  No instability.    Imaging: US VENOUS DOPPLER LEG LEFT - DIAG IMG (CPT=93971)    Result Date: 2/22/2024  PROCEDURE: US VENOUS DOPPLER LEG LEFT-DIAG IMG (CPT=93971)  COMPARISON: [facility] 2nd Floor, XR KNEE (3 VIEWS), LEFT (CPT=73562), 8/16/2023, 10:48 AM.  [facility], XR KNEE (1 OR 2 VIEWS), LEFT (CPT=73560), 2/22/2024, 2:38 PM.  [facility], US VENOUS DOPPLER  LEG LEFT-DIAG IMG (CPT=93971), 7/08/2023, 1:34 PM.  INDICATIONS: Left lower extremity pain and swelling  TECHNIQUE: Color duplex Doppler venous ultrasound of the left lower extremity was performed in the usual manner.  FINDINGS: The femoral and popliteal veins appear normal.  Normal flow was demonstrated with color and pulsed Doppler.  Visualized portions of the great and small saphenous, posterior tibial, and peroneal veins appear normal.   THROMBI: None visible. COMPRESSIBILITY: Normal. OTHER: Small fluid collection in the popliteal fossa measuring 2.7 x 0.7 x 2.2 cm more likely Baker's cyst.  Additional fluid collection anteriorly more likely part of a joint effusion.  Correlate clinically.         CONCLUSION:  1. No evidence of deep venous thrombosis in the left lower extremity. 2. Probable small Baker's cyst. 3. Anterior fluid collection more likely part of a joint effusion.  Correlate clinically.    Dictated by (CST): Isreal Bullock MD on 2/22/2024 at 4:02 PM     Finalized  by (CST): Isreal Bullock MD on 2/22/2024 at 4:06 PM          XR KNEE (1 OR 2 VIEWS), LEFT (CPT=73560)    Result Date: 2/22/2024  PROCEDURE: XR KNEE (1 OR 2 VIEWS), LEFT (CPT=73560)  COMPARISON: [facility] 2nd Floor, XR KNEE (3 VIEWS), LEFT (CPT=73562), 8/16/2023, 10:48 AM.  INDICATIONS: Left knee pain for awhile . No injury.  TECHNIQUE: 2 views were obtained.   FINDINGS:  BONES: No acute appearing fracture or dislocation.  Mild tricompartment joint space narrowing. SOFT TISSUES: Negative. No visible soft tissue swelling. EFFUSION: None visible. OTHER: Negative.         CONCLUSION:  1. No acute appearing fracture or dislocation.  Mild tricompartment joint space narrowing.    Dictated by (CST): Isreal Bullock MD on 2/22/2024 at 3:09 PM     Finalized by (CST): Isreal Bullock MD on 2/22/2024 at 3:09 PM             Lab Results   Component Value Date    WBC 10.9 01/23/2024    HGB 11.6 (L) 01/23/2024    .0 01/23/2024      Lab Results   Component Value Date     (H) 08/26/2022    BUN 9 08/26/2022    CREATSERUM 0.87 08/26/2022    GFRNAA 64 04/25/2022    GFRAA 74 04/25/2022        Assessment and Plan:  Diagnoses and all orders for this visit:    Chronic pain of left knee  -     MRI KNEE, LEFT (CPT=73721); Future    Primary osteoarthritis of left knee  -     arthrocentesis major joint  -     triamcinolone acetonide (Kenalog-40) 40 MG/ML injection 40 mg  -     MRI KNEE, LEFT (LHK=20066); Future        Assessment: She has recurrent effusion in the left knee.    Plan: I aspirated and injected the knee with local anesthetic and cortisone.  I have ordered an MRI scan of the knee and will follow-up with her after this been completed.  If there are no mechanical derangement in the knee, I would consider sending her for rheumatology evaluation.    The above note was creating using Dragon speech recognition technology. Please excuse any typos.    NAHEED SAHU MD

## 2024-03-06 NOTE — PROGRESS NOTES
Per verbal order from Dr. Perez, draw up and 4ml of 0.5% Marcaine and 1ml of Kenalog 40 for injection into left knee.Rachael Mccullough  Patient provided education handout for cortisone injection.

## 2024-03-07 LAB — CRYSTALS FLD MICRO: POSITIVE

## 2024-03-12 ENCOUNTER — ANTI-COAG VISIT (OUTPATIENT)
Dept: ANTICOAGULATION | Facility: CLINIC | Age: 64
End: 2024-03-12

## 2024-03-12 DIAGNOSIS — D57.00 HB-SS DISEASE WITH CRISIS (HCC): ICD-10-CM

## 2024-03-12 DIAGNOSIS — Z79.01 MONITORING FOR LONG-TERM ANTICOAGULANT USE: ICD-10-CM

## 2024-03-12 DIAGNOSIS — D57.20 SICKLE CELL DISEASE, TYPE SC, WITHOUT CRISIS (HCC): Primary | ICD-10-CM

## 2024-03-12 DIAGNOSIS — Z51.81 MONITORING FOR LONG-TERM ANTICOAGULANT USE: ICD-10-CM

## 2024-03-12 LAB — INR: 2.4 (ref 0.8–1.2)

## 2024-03-12 PROCEDURE — 93793 ANTICOAG MGMT PT WARFARIN: CPT | Performed by: FAMILY MEDICINE

## 2024-03-12 PROCEDURE — 85610 PROTHROMBIN TIME: CPT | Performed by: FAMILY MEDICINE

## 2024-03-12 NOTE — PROGRESS NOTES
TTR 59.9%    Face to face.      Denies any change to diet/meds. INR is minimally below therapeutic goal range.      Per protocol, continue current dose and recheck INR in 4 weeks.    5 mg every Mon, Wed, Fri; 7.5 mg all other days

## 2024-03-26 ENCOUNTER — HOSPITAL ENCOUNTER (OUTPATIENT)
Dept: CT IMAGING | Facility: HOSPITAL | Age: 64
Discharge: HOME | End: 2024-03-26
Attending: FAMILY MEDICINE
Payer: COMMERCIAL

## 2024-03-26 DIAGNOSIS — R91.1 PULMONARY NODULE: ICD-10-CM

## 2024-03-26 PROCEDURE — 71250 CT THORAX DX C-: CPT | Performed by: FAMILY MEDICINE

## 2024-04-06 ENCOUNTER — APPOINTMENT (OUTPATIENT)
Dept: ULTRASOUND IMAGING | Facility: HOSPITAL | Age: 64
End: 2024-04-06
Attending: EMERGENCY MEDICINE
Payer: COMMERCIAL

## 2024-04-06 ENCOUNTER — HOSPITAL ENCOUNTER (EMERGENCY)
Facility: HOSPITAL | Age: 64
Discharge: HOME | End: 2024-04-07
Attending: EMERGENCY MEDICINE
Payer: COMMERCIAL

## 2024-04-06 DIAGNOSIS — M79.89 PAIN AND SWELLING OF LEFT LOWER LEG: Primary | ICD-10-CM

## 2024-04-06 DIAGNOSIS — M79.662 PAIN AND SWELLING OF LEFT LOWER LEG: Primary | ICD-10-CM

## 2024-04-06 PROCEDURE — 93971 EXTREMITY STUDY: CPT | Performed by: EMERGENCY MEDICINE

## 2024-04-06 PROCEDURE — 99284 EMERGENCY DEPT VISIT MOD MDM: CPT

## 2024-04-07 VITALS
DIASTOLIC BLOOD PRESSURE: 71 MMHG | SYSTOLIC BLOOD PRESSURE: 123 MMHG | TEMPERATURE: 98 F | RESPIRATION RATE: 18 BRPM | HEART RATE: 79 BPM | OXYGEN SATURATION: 92 % | HEIGHT: 62 IN | BODY MASS INDEX: 37 KG/M2

## 2024-04-07 RX ORDER — PREDNISONE 20 MG/1
TABLET ORAL
Qty: 12 TABLET | Refills: 0 | Status: SHIPPED | OUTPATIENT
Start: 2024-04-07 | End: 2024-04-13

## 2024-04-07 NOTE — ED PROVIDER NOTES
Patient Seen in: [facility] Emergency Department      History     Chief Complaint   Patient presents with    Swelling     Stated Complaint: Knee pain    Subjective:   HPI    63-year-old female with sickle cell disease seeing orthopedics recently with left knee injections with ongoing pain and aspiration of fluid with a reported outpatient CT recently of the knee due for an upcoming MRI of the knee here with now pain and lower leg swelling distal to the knee.  The knee symptoms have not changed.  She has no fever.  She is on chronic coumadin therapy.  No trauma.  Does not want anything for pain currently.  Still able to walk.     Objective:   Past Medical History:   Diagnosis Date    Asthma (HCC)     Sickle cell anemia (HCC)               No pertinent past surgical history.              No pertinent social history.            Review of Systems    Positive for stated complaint: Knee pain  Other systems are as noted in HPI.  Constitutional and vital signs reviewed.      All other systems reviewed and negative except as noted above.    Physical Exam     ED Triage Vitals [04/06/24 2254]   /81   Pulse 92   Resp 18   Temp 98 °F (36.7 °C)   Temp src Oral   SpO2 93 %   O2 Device None (Room air)       Current:/81   Pulse 92   Temp 98 °F (36.7 °C) (Oral)   Resp 18   Ht 157.5 cm (5' 2\")   SpO2 93%   BMI 36.58 kg/m²         Physical Exam    Constitutional: Oriented to person, place, and time.  Appears well-developed. No distress.   Head: Normocephalic and atraumatic.   Eyes: Conjunctivae are normal. Pupils are equal, round, and reactive to light.   Cardiovascular: Normal rate, regular rhythm and intact distal pulses.    Pulmonary/Chest: Effort normal. No respiratory distress.   Musculoskeletal: Likely small effusion to the left knee.  No significant redness or warmth.  Range of motion intact but limited secondary to pain.  There is mild more distal lower leg swelling on the left around theAnd around  the ankle joint and dorsal foot.  No cellulitic findings.  No crepitus.  Lower leg compartments are soft.  Range of motion of the ankle is intact.  Motor and sensory is intact distally and there is a strong dorsalis pedis pulse.  No open wounds.  Neurological: Alert and oriented to person, place, and time.   Skin: Skin is warm and dry.   Nursing note and vitals reviewed.    Differential diagnosis includes left lower extremity DVT, strain, muscle hematoma, ruptured Baker's cyst.      ED Course   Labs Reviewed - No data to display          VENOUS DUPLEX ULTRASOUND LEFT LOWER EXTREMITY    IMPRESSION:  1. No evidence of deep venous thrombosis in the left lower extremity and visualized calf veins.  2. Limited images of the right saphenofemoral junction appear patent.      Report finalized/faxed at 1:41 AM ET.  If you would like to discuss case, please call [phone] ext [phone] or ext [phone].    Rafael Panchal M.D.  This report has been electronically signed and verified by the Radiologist whose name is printed above.         MDM                                         Medical Decision Making  Imaging reassuring.  Patient is not diabetic.  She will continue her Coumadin.  Will do a short course of steroids and have her call orthopedic doctor.  No gross signs of infection.  She will elevate the leg.  She can do over-the-counter Tylenol continue her regular prescribed medications use ice stretch stay active and come back with any worsening or change    Problems Addressed:  Pain and swelling of left lower leg: acute illness or injury    Amount and/or Complexity of Data Reviewed  External Data Reviewed: radiology.     Details: 2/22/24 xray knee:    Impression  CONCLUSION:  1. No acute appearing fracture or dislocation.  Mild tricompartment joint space narrowing.         Radiology: ordered and independent interpretation performed. Decision-making details documented in ED Course.     Details: By my gross review of the left leg venous  duplex I do not appreciate obvious evidence of gross clot and noncompressibility    Risk  OTC drugs.  Prescription drug management.        Disposition and Plan     Clinical Impression:  1. Pain and swelling of left lower leg         Disposition:  Discharge  4/7/2024 12:54 am    Follow-up:  Radha Ramos MD  [address] [place] [id number] [phone]    Call  As needed    Osmani Perez MD  [address] [facility] [address]  [facility] [address] [phone]    Call in 1 day(s)            Medications Prescribed:  Current Discharge Medication List        START taking these medications    Details   predniSONE 20 MG Oral Tab Take 3 tablets (60 mg total) by mouth daily for 2 days, THEN 2 tablets (40 mg total) daily for 2 days, THEN 1 tablet (20 mg total) daily for 2 days.  Qty: 12 tablet, Refills: 0

## 2024-04-09 ENCOUNTER — ANTI-COAG VISIT (OUTPATIENT)
Dept: ANTICOAGULATION | Facility: CLINIC | Age: 64
End: 2024-04-09

## 2024-04-09 ENCOUNTER — OFFICE VISIT (OUTPATIENT)
Dept: FAMILY MEDICINE CLINIC | Facility: CLINIC | Age: 64
End: 2024-04-09

## 2024-04-09 VITALS
HEART RATE: 62 BPM | SYSTOLIC BLOOD PRESSURE: 136 MMHG | RESPIRATION RATE: 16 BRPM | OXYGEN SATURATION: 94 % | BODY MASS INDEX: 35.77 KG/M2 | DIASTOLIC BLOOD PRESSURE: 77 MMHG | HEIGHT: 62 IN | WEIGHT: 194.38 LBS

## 2024-04-09 DIAGNOSIS — Z79.891 LONG TERM (CURRENT) USE OF OPIATE ANALGESIC: ICD-10-CM

## 2024-04-09 DIAGNOSIS — G89.29 OTHER CHRONIC PAIN: ICD-10-CM

## 2024-04-09 DIAGNOSIS — Z79.01 MONITORING FOR LONG-TERM ANTICOAGULANT USE: ICD-10-CM

## 2024-04-09 DIAGNOSIS — Z51.81 MONITORING FOR LONG-TERM ANTICOAGULANT USE: ICD-10-CM

## 2024-04-09 DIAGNOSIS — D57.00 HB-SS DISEASE WITH CRISIS (HCC): ICD-10-CM

## 2024-04-09 DIAGNOSIS — D57.20 SICKLE CELL DISEASE, TYPE SC, WITHOUT CRISIS (HCC): Primary | ICD-10-CM

## 2024-04-09 LAB
INR: 1.5 (ref 0.8–1.2)
TEST STRIP EXPIRATION DATE: ABNORMAL DATE

## 2024-04-09 PROCEDURE — 3008F BODY MASS INDEX DOCD: CPT | Performed by: FAMILY MEDICINE

## 2024-04-09 PROCEDURE — 99213 OFFICE O/P EST LOW 20 MIN: CPT | Performed by: FAMILY MEDICINE

## 2024-04-09 PROCEDURE — 93793 ANTICOAG MGMT PT WARFARIN: CPT | Performed by: FAMILY MEDICINE

## 2024-04-09 PROCEDURE — 3075F SYST BP GE 130 - 139MM HG: CPT | Performed by: FAMILY MEDICINE

## 2024-04-09 PROCEDURE — 3078F DIAST BP <80 MM HG: CPT | Performed by: FAMILY MEDICINE

## 2024-04-09 PROCEDURE — 85610 PROTHROMBIN TIME: CPT | Performed by: FAMILY MEDICINE

## 2024-04-09 RX ORDER — HYDROCODONE BITARTRATE AND ACETAMINOPHEN 10; 325 MG/1; MG/1
TABLET ORAL
Qty: 180 TABLET | Refills: 0 | Status: SHIPPED | OUTPATIENT
Start: 2024-04-09

## 2024-04-09 NOTE — TELEPHONE ENCOUNTER
Failed per [facility] protocol.    Failed due to labs/results via protocol. Medication order pended- she is current with the coumadin clinic.     Requested Prescriptions   Pending Prescriptions Disp Refills    WARFARIN 5 MG Oral Tab [Pharmacy Med Name: WARFARIN SODIUM 5 MG TABLET] 120 tablet 0     Sig: Take as directed by the INR clinic or take 1 tablet Monday Wednesday Friday, take 1 & 1/2 tablets all other days.       Rx Eemg Warfarin Protocol Failed - 4/6/2024 12:00 PM        Failed - CMP within past 12 months     No results found for this or any previous visit (from the past 4380 hour(s)).                Failed - AST < 111 (less than 3 Xs the upper limit)     No results found for: \"AST\"                  Failed - ALT < 168 (less than 3Xs the upper limit)     No results found for: \"ALT\"                  Passed - Appointment in past 12 or next 3 months     Recent Outpatient Visits              Today Other chronic pain    [facility] Radha Ramos MD    Office Visit    1 month ago Chronic pain of left knee    [facility] [place]    Office Visit    2 months ago Sickle cell disease, type sc, without crisis (HCC)    [facility] Hematology Oncology Matthew Chase MD    Office Visit    2 months ago Sickle cell disease, type sc, without crisis (HCC)    [facility] Hematology Oncology    Nurse Only    2 months ago Primary osteoarthritis of left knee    [facility] Osmani Perez MD    Office Visit          Future Appointments         Provider Department Appt Notes    In 2 weeks [facility] MRI [facility] (1.5T WIDE) [facility] MRI [facility]     In 2 weeks Kim Alejandre RN [facility]                Passed - INR 3.9 or less past 6 weeks     INR   Date Value Ref Range Status   04/09/2024 1.5 (A) 0.8 -  1.2 Final                   Passed - CBC within the past 12 months     Recent Results (from the past 8760 hour(s))   CBC W/ DIFFERENTIAL    Collection Time: 01/23/24 10:43 AM   Result Value Ref Range    WBC 10.9 4.0 - 11.0 x10(3) uL    RBC 3.83 3.80 - 5.30 x10(6)uL    HGB 11.6 (L) 12.0 - 16.0 g/dL    HCT 32.8 (L) 35.0 - 48.0 %    MCV 85.6 80.0 - 100.0 fL    MCH 30.3 26.0 - 34.0 pg    MCHC 35.4 31.0 - 37.0 g/dL    RDW-SD 49.8 (H) 35.1 - 46.3 fL    RDW 16.5 (H) 11.0 - 15.0 %    .0 150.0 - 450.0 10(3)uL    Neutrophil Absolute Prelim 6.52 1.50 - 7.70 x10 (3) uL    Neutrophil Absolute 6.52 1.50 - 7.70 x10(3) uL    Lymphocyte Absolute 2.87 1.00 - 4.00 x10(3) uL    Monocyte Absolute 1.22 (H) 0.10 - 1.00 x10(3) uL    Eosinophil Absolute 0.15 0.00 - 0.70 x10(3) uL    Basophil Absolute 0.04 0.00 - 0.20 x10(3) uL    Immature Granulocyte Absolute 0.05 0.00 - 1.00 x10(3) uL    Neutrophil % 60.0 %    Lymphocyte % 26.5 %    Monocyte % 11.2 %    Eosinophil % 1.4 %    Basophil % 0.4 %    Immature Granulocyte % 0.5 %     *Note: Due to a large number of results and/or encounters for the requested time period, some results have not been displayed. A complete set of results can be found in Results Review.                 Passed - Hgb >/= 10g/dl within past 12 months     HGB   Date Value Ref Range Status   01/23/2024 11.6 (L) 12.0 - 16.0 g/dL Final                   Passed - Platelets >/= 151 past 12 months     PLT   Date Value Ref Range Status   01/23/2024 386.0 150.0 - 450.0 10(3)uL Final                        Recent Outpatient Visits              Today Other [facility]    Office Visit    1 month ago Chronic pain of left knee    [facility], Osmani Jeronimo MD    Office Visit    2 months ago Sickle cell disease, type sc, without crisis (HCC)    [facility] Hematology Oncology Matthew Chase MD     Office Visit    2 months ago Sickle cell disease, type sc, without crisis (HCC)    [facility] Hematology Oncology    Nurse Only    2 months ago Primary osteoarthritis of left knee    [facility] Osmani Perez MD    Office Visit          Future Appointments         Provider Department Appt Notes    In 2 weeks [facility] MRI [facility] (1.5T WIDE) [facility]     In 2 weeks Kim Alejandre RN [facility]

## 2024-04-09 NOTE — PROGRESS NOTES
Subjective:   Patient ID: Daphne Reynoso is a 63 year old female.    HPI  Patient here for er for knee pain 'feeling better after receiving steroid pack   Still some pain also pending MRI Knee  History/Other:   Review of Systems  Constitutional: Negative.  Negative for activity change, appetite change, diaphoresis and fatigue.     Respiratory: Negative.  Negative for apnea, cough, chest tightness and shortness of breath.    Cardiovascular: Negative.  Negative for chest pain, palpitations and leg swelling.   Gastrointestinal: Negative.  Negative for abdominal pain.   MSK see hpi     Current Outpatient Medications   Medication Sig Dispense Refill    HYDROcodone-acetaminophen  MG Oral Tab 1 po bid prn Please dispense 3 month supply 180 tablet 0    predniSONE 20 MG Oral Tab Take 3 tablets (60 mg total) by mouth daily for 2 days, THEN 2 tablets (40 mg total) daily for 2 days, THEN 1 tablet (20 mg total) daily for 2 days. 12 tablet 0    amLODIPine Besy-Benazepril HCl 10-20 MG Oral Cap Take 1 capsule by mouth daily. 90 capsule 1    warfarin 5 MG Oral Tab Take as directed by the INR clinic or take 1 tablet Monday Wednesday Friday, take 1 & 1/2 tablets all other days. 120 tablet 0    lidocaine 5 % External Patch PLACE 2 PATCHES ONTO THE SKIN AT NIGHT PLACE FOR 12 HOURS ON AND 12 HOURS OFF 60 patch 2    folic acid 1 MG Oral Tab Take 1 tablet (1 mg total) by mouth daily. 90 tablet 3    Oxybutynin Chloride ER 15 MG Oral Tablet 24 Hr Take 1 tablet (15 mg total) by mouth daily. Take with 1 tablet (5mg )daily for total of 20 mg daily. 90 tablet 3    fluticasone propionate 50 MCG/ACT Nasal Suspension 2 sprays by Nasal route daily. 48 mL 3    albuterol 108 (90 Base) MCG/ACT Inhalation Aero Soln Inhale 2 puffs into the lungs every 4 (four) hours as needed. 42.5 each 1    Calcium Citrate-Vitamin D 315-250 MG-UNIT Oral Tab Take 1 tablet by mouth daily.      Vitamin D3, Cholecalciferol, 10 MCG (400 UNIT) Oral Tab Take 2.5 tablets  (1,000 Units total) by mouth daily.       Allergies:  Allergies   Allergen Reactions    Codeine PALPITATIONS    Morphine PALPITATIONS    Opioid Analgesics SHORTNESS OF BREATH     heart races with the use of codeine  Itching and tachycardia      Sulfa Antibiotics SHORTNESS OF BREATH     Rash and tongue swells, itching  Other reaction(s): Swelling - oral/pharyngeal    Dust Mite Extract OTHER (SEE COMMENTS)     Stuffy nose, HA       Objective:   Physical Exam  Constitutional:       Appearance: She is well-developed.   Cardiovascular:      Rate and Rhythm: Normal rate and regular rhythm.      Heart sounds: Normal heart sounds.   Pulmonary:      Effort: Pulmonary effort is normal.      Breath sounds: Normal breath sounds.   Musculoskeletal:         General: Swelling and tenderness present.   Neurological:      Mental Status: She is alert and oriented to person, place, and time.      Deep Tendon Reflexes: Reflexes are normal and symmetric.         Assessment & Plan:   1. Other chronic pain    2. Long term (current) use of opiate analgesic    3. Knee pain -better   See dr Perez for f/u   Refill vicodin   F/u in 1 month     No orders of the defined types were placed in this encounter.      Meds This Visit:  Requested Prescriptions     Signed Prescriptions Disp Refills    HYDROcodone-acetaminophen  MG Oral Tab 180 tablet 0     Si po bid prn Please dispense 3 month supply       Imaging & Referrals:  None

## 2024-04-09 NOTE — PROGRESS NOTES
TTR 58.7%    Face to face.     ED visit last week 4/7 for right knee/leg swelling- negative for blood clot. Given prednisone tapering dose to take for 6 days. Denies any missed doses, no change in diet.     Per protocol, take two extra partial doses of warfarin, then continue current dose and recheck INR in 2 weeks.     4/9: 10 mg; 4/10: 7.5 mg; Otherwise 5 mg every Mon, Wed, Fri; 7.5 mg all other days

## 2024-04-10 RX ORDER — WARFARIN SODIUM 5 MG/1
TABLET ORAL
Qty: 120 TABLET | Refills: 1 | Status: SHIPPED | OUTPATIENT
Start: 2024-04-10

## 2024-04-14 ENCOUNTER — APPOINTMENT (OUTPATIENT)
Dept: GENERAL RADIOLOGY | Facility: HOSPITAL | Age: 64
End: 2024-04-14
Attending: EMERGENCY MEDICINE
Payer: COMMERCIAL

## 2024-04-14 ENCOUNTER — HOSPITAL ENCOUNTER (EMERGENCY)
Facility: HOSPITAL | Age: 64
Discharge: HOME | End: 2024-04-14
Attending: EMERGENCY MEDICINE
Payer: COMMERCIAL

## 2024-04-14 VITALS
RESPIRATION RATE: 16 BRPM | TEMPERATURE: 99 F | SYSTOLIC BLOOD PRESSURE: 127 MMHG | DIASTOLIC BLOOD PRESSURE: 78 MMHG | HEART RATE: 70 BPM | OXYGEN SATURATION: 97 %

## 2024-04-14 DIAGNOSIS — R07.9 ACUTE CHEST PAIN: ICD-10-CM

## 2024-04-14 DIAGNOSIS — D57.00 SICKLE CELL PAIN CRISIS (HCC): Primary | ICD-10-CM

## 2024-04-14 LAB
ALBUMIN SERPL-MCNC: 4 G/DL (ref 3.2–4.8)
ALBUMIN/GLOB SERPL: 1.3 {RATIO} (ref 1–2)
ALP LIVER SERPL-CCNC: 73 U/L
ALT SERPL-CCNC: 12 U/L
ANION GAP SERPL CALC-SCNC: 5 MMOL/L (ref 0–18)
ANTIBODY SCREEN: NEGATIVE
AST SERPL-CCNC: 15 U/L (ref ?–34)
BASOPHILS # BLD AUTO: 0.04 X10(3) UL (ref 0–0.2)
BASOPHILS NFR BLD AUTO: 0.3 %
BILIRUB SERPL-MCNC: 2.4 MG/DL (ref 0.2–1.1)
BUN BLD-MCNC: 10 MG/DL (ref 9–23)
BUN/CREAT SERPL: 9.8 (ref 10–20)
CALCIUM BLD-MCNC: 9.8 MG/DL (ref 8.7–10.4)
CHLORIDE SERPL-SCNC: 107 MMOL/L (ref 98–112)
CO2 SERPL-SCNC: 26 MMOL/L (ref 21–32)
CREAT BLD-MCNC: 1.02 MG/DL
DEPRECATED RDW RBC AUTO: 50.4 FL (ref 35.1–46.3)
EGFRCR SERPLBLD CKD-EPI 2021: 62 ML/MIN/1.73M2 (ref 60–?)
EOSINOPHIL # BLD AUTO: 0.05 X10(3) UL (ref 0–0.7)
EOSINOPHIL NFR BLD AUTO: 0.4 %
ERYTHROCYTE [DISTWIDTH] IN BLOOD BY AUTOMATED COUNT: 16.6 % (ref 11–15)
FLUAV + FLUBV RNA SPEC NAA+PROBE: NEGATIVE
FLUAV + FLUBV RNA SPEC NAA+PROBE: NEGATIVE
GLOBULIN PLAS-MCNC: 3.1 G/DL (ref 2.8–4.4)
GLUCOSE BLD-MCNC: 107 MG/DL (ref 70–99)
HCT VFR BLD AUTO: 32.9 %
HGB BLD-MCNC: 12.3 G/DL
HGB RETIC QN AUTO: 33.5 PG (ref 28.2–36.6)
IMM GRANULOCYTES # BLD AUTO: 0.09 X10(3) UL (ref 0–1)
IMM GRANULOCYTES NFR BLD: 0.7 %
IMM RETICS NFR: 0.27 RATIO (ref 0.1–0.3)
INR BLD: 2.66 (ref 0.8–1.2)
LYMPHOCYTES # BLD AUTO: 2.32 X10(3) UL (ref 1–4)
LYMPHOCYTES NFR BLD AUTO: 17.6 %
MCH RBC QN AUTO: 32 PG (ref 26–34)
MCHC RBC AUTO-ENTMCNC: 37.4 G/DL (ref 31–37)
MCV RBC AUTO: 85.7 FL
MONOCYTES # BLD AUTO: 1.59 X10(3) UL (ref 0.1–1)
MONOCYTES NFR BLD AUTO: 12.1 %
NEUTROPHILS # BLD AUTO: 9.06 X10 (3) UL (ref 1.5–7.7)
NEUTROPHILS # BLD AUTO: 9.06 X10(3) UL (ref 1.5–7.7)
NEUTROPHILS NFR BLD AUTO: 68.9 %
OSMOLALITY SERPL CALC.SUM OF ELEC: 286 MOSM/KG (ref 275–295)
PLATELET # BLD AUTO: 319 10(3)UL (ref 150–450)
PLATELET MORPHOLOGY: NORMAL
PLATELETS.RETICULATED NFR BLD AUTO: 6.1 % (ref 0–7)
POTASSIUM SERPL-SCNC: 3.6 MMOL/L (ref 3.5–5.1)
PROT SERPL-MCNC: 7.1 G/DL (ref 5.7–8.2)
PROTHROMBIN TIME: 30 SECONDS (ref 11.6–14.8)
RBC # BLD AUTO: 3.84 X10(6)UL
RETICS # AUTO: 144.5 X10(3) UL (ref 22.5–147.5)
RETICS/RBC NFR AUTO: 4.5 %
RH BLOOD TYPE: POSITIVE
RH BLOOD TYPE: POSITIVE
RSV RNA SPEC NAA+PROBE: NEGATIVE
SARS-COV-2 RNA RESP QL NAA+PROBE: NOT DETECTED
SODIUM SERPL-SCNC: 138 MMOL/L (ref 136–145)
TROPONIN I SERPL HS-MCNC: 3 NG/L
TROPONIN I SERPL HS-MCNC: 3 NG/L
WBC # BLD AUTO: 13.2 X10(3) UL (ref 4–11)

## 2024-04-14 PROCEDURE — 93005 ELECTROCARDIOGRAM TRACING: CPT

## 2024-04-14 PROCEDURE — 93010 ELECTROCARDIOGRAM REPORT: CPT

## 2024-04-14 PROCEDURE — 99285 EMERGENCY DEPT VISIT HI MDM: CPT

## 2024-04-14 PROCEDURE — 86850 RBC ANTIBODY SCREEN: CPT | Performed by: EMERGENCY MEDICINE

## 2024-04-14 PROCEDURE — 96361 HYDRATE IV INFUSION ADD-ON: CPT

## 2024-04-14 PROCEDURE — 86901 BLOOD TYPING SEROLOGIC RH(D): CPT | Performed by: EMERGENCY MEDICINE

## 2024-04-14 PROCEDURE — 85045 AUTOMATED RETICULOCYTE COUNT: CPT | Performed by: EMERGENCY MEDICINE

## 2024-04-14 PROCEDURE — 96376 TX/PRO/DX INJ SAME DRUG ADON: CPT

## 2024-04-14 PROCEDURE — 80053 COMPREHEN METABOLIC PANEL: CPT | Performed by: EMERGENCY MEDICINE

## 2024-04-14 PROCEDURE — 96374 THER/PROPH/DIAG INJ IV PUSH: CPT

## 2024-04-14 PROCEDURE — 0241U SARS-COV-2/FLU A AND B/RSV BY PCR (GENEXPERT): CPT | Performed by: EMERGENCY MEDICINE

## 2024-04-14 PROCEDURE — 86900 BLOOD TYPING SEROLOGIC ABO: CPT | Performed by: EMERGENCY MEDICINE

## 2024-04-14 PROCEDURE — 99284 EMERGENCY DEPT VISIT MOD MDM: CPT

## 2024-04-14 PROCEDURE — 85025 COMPLETE CBC W/AUTO DIFF WBC: CPT | Performed by: EMERGENCY MEDICINE

## 2024-04-14 PROCEDURE — 84484 ASSAY OF TROPONIN QUANT: CPT | Performed by: EMERGENCY MEDICINE

## 2024-04-14 PROCEDURE — 71045 X-RAY EXAM CHEST 1 VIEW: CPT | Performed by: EMERGENCY MEDICINE

## 2024-04-14 PROCEDURE — 85610 PROTHROMBIN TIME: CPT | Performed by: EMERGENCY MEDICINE

## 2024-04-14 RX ORDER — HYDROMORPHONE HYDROCHLORIDE 1 MG/ML
1 INJECTION, SOLUTION INTRAMUSCULAR; INTRAVENOUS; SUBCUTANEOUS ONCE
Status: COMPLETED | OUTPATIENT
Start: 2024-04-14 | End: 2024-04-14

## 2024-04-14 NOTE — ED PROVIDER NOTES
Patient Seen in: [facility] Emergency Department      History     Chief Complaint   Patient presents with    Chest Pain Angina    Sickle Cell     Stated Complaint: Chest pain, Sickle Cell crisis    Subjective:   HPI    63-year-old female presents for evaluation for chest pain for the past 2 days.  Patient reports it feels like a sickle cell crisis.  She did endorse some mild dyspnea.  She has been taking her home hydrocodone without relief.  Pain is severe.  Pain does not radiate and is constant.   reports that she did have some fevers at home.  She denies any cough, congestion, nausea, vomiting, abdominal pain, diarrhea, dysuria, hematuria.  She is on Coumadin.    Objective:   No pertinent past medical history.            No pertinent past surgical history.              No pertinent social history.            Review of Systems    Positive for stated complaint: Chest pain, Sickle Cell crisis  Other systems are as noted in HPI.  Constitutional and vital signs reviewed.      All other systems reviewed and negative except as noted above.    Physical Exam     ED Triage Vitals [04/14/24 1738]   /72   Pulse 96   Resp 16   Temp 98.9 °F (37.2 °C)   Temp src    SpO2 95 %   O2 Device None (Room air)       Current:/78   Pulse 70   Temp 98.9 °F (37.2 °C)   Resp 16   SpO2 97%         Physical Exam  Vitals and nursing note reviewed.   Constitutional:       Appearance: Normal appearance.   HENT:      Head: Normocephalic and atraumatic.   Cardiovascular:      Rate and Rhythm: Normal rate and regular rhythm.      Pulses: Normal pulses.           Radial pulses are 2+ on the right side and 2+ on the left side.      Heart sounds: Normal heart sounds.   Pulmonary:      Effort: Pulmonary effort is normal.      Breath sounds: Normal breath sounds and air entry.   Abdominal:      General: Bowel sounds are normal.      Palpations: Abdomen is soft.      Tenderness: There is no abdominal tenderness. There is no  guarding.   Musculoskeletal:         General: Normal range of motion.      Cervical back: Normal range of motion.   Skin:     General: Skin is warm and dry.   Neurological:      General: No focal deficit present.      Mental Status: She is alert.               ED Course     Labs Reviewed   COMP METABOLIC PANEL (14) - Abnormal; Notable for the following components:       Result Value    Glucose 107 (*)     BUN/CREA Ratio 9.8 (*)     Bilirubin, Total 2.4 (*)     All other components within normal limits   PROTHROMBIN TIME (PT) - Abnormal; Notable for the following components:    PT 30.0 (*)     INR 2.66 (*)     All other components within normal limits   RETICULOCYTE COUNT - Abnormal; Notable for the following components:    Retic% 4.5 (*)     All other components within normal limits   RBC MORPHOLOGY SCAN - Abnormal; Notable for the following components:    RBC Morphology See morphology below (*)     Target Cells 2+ (*)     All other components within normal limits   CBC W/ DIFFERENTIAL - Abnormal; Notable for the following components:    WBC 13.2 (*)     HCT 32.9 (*)     MCHC 37.4 (*)     RDW-SD 50.4 (*)     RDW 16.6 (*)     Neutrophil Absolute Prelim 9.06 (*)     Neutrophil Absolute 9.06 (*)     Monocyte Absolute 1.59 (*)     All other components within normal limits   TROPONIN I HIGH SENSITIVITY - Normal   TROPONIN I HIGH SENSITIVITY - Normal   SARS-COV-2/FLU A AND B/RSV BY PCR (GENEXPERT) - Normal    Narrative:     This test is intended for the qualitative detection and differentiation of SARS-CoV-2, influenza A, influenza B, and respiratory syncytial virus (RSV) viral RNA in nasopharyngeal or nares swabs from individuals suspected of respiratory viral infection consistent with COVID-19 by their healthcare provider. Signs and symptoms of respiratory viral infection due to SARS-CoV-2, influenza, and RSV can be similar.    Test performed using the Xpert Xpress SARS-CoV-2/FLU/RSV (real time RT-PCR)  assay on the  [employer] instrument, [employer], [place] [address].   This test is being used under the Food and Drug Administration's Emergency Use Authorization.    The authorized Fact Sheet for Healthcare Providers for this assay is available upon request from the laboratory.   CBC WITH DIFFERENTIAL WITH PLATELET    Narrative:     The following orders were created for panel order CBC With Differential With Platelet.  Procedure                               Abnormality         Status                     ---------                               -----------         ------                     CBC W/ DIFFERENTIAL[[id number]]          Abnormal            Final result                 Please view results for these tests on the individual orders.   TYPE AND SCREEN    Narrative:     The following orders were created for panel order Type and screen.  Procedure                               Abnormality         Status                     ---------                               -----------         ------                     ABORH (Blood Type)[[id number]]                               Final result               Antibody Screen[[id number]]                                  Final result                 Please view results for these tests on the individual orders.   ABORH (BLOOD TYPE)   ANTIBODY SCREEN   ABORH CONFIRMATION   RAINBOW DRAW LAVENDER   RAINBOW DRAW LIGHT GREEN   RAINBOW DRAW BLUE   RAINBOW DRAW GOLD     EKG    Rate, intervals and axes as noted on EKG Report.  Rate: 84  Rhythm: Sinus Rhythm  Reading: no stemi, interpreted by myself.    EKG    Rate, intervals and axes as noted on EKG Report.  Rate: 58  Rhythm: Sinus Rhythm  Reading: no stemi, interpreted by myself.                      MDM                                         Medical Decision Making  Differential diagnosis includes but is not limited to sickle cell crisis, ACS, acute chest syndrome, pneumonia, etc.    CBC shows a leukocytosis.  Hemoglobin is stable.  Reticulocyte count  is elevated. CXR unremarkable. CBC and BMP are unremarkable. Zero and two hour troponin are within normal limits. Zero and two hour EKGs are without acute ischemic changes.  Patient was feeling better after 2 doses of Dilaudid.  I suspect sickle cell crisis causing her chest pain.  ACS considered but less likely.  INR therapeutic, PE unlikely.  Patient feels comfortable discharge home.    Rhythm Strip: Rate 70 sinus rhythm. The cardiac monitor was ordered secondary to the patient's chest pain.     Medical Record Review: I personally reviewed available prior medical records for any recent pertinent discharge summaries, testing, and procedures, and reviewed those reports.    Complicating factors: The patient  has a past medical history of Asthma (HCC) and Sickle cell anemia (HCC). and  has a past surgical history that includes ; other surgical history; neck/chest procedure unlisted; repair rotator cuff,acute; laparoscopy,diagnostic; hysterectomy; colonoscopy (2008); and colonoscopy screening - referral (N/A, 10/20/2022). that contribute to the medical complexity of this ED evaluation.     Clinical impression as well as any lab results and radiology findings were discussed with the patient and/or caregiver. I personally reviewed all laboratory results and radiology images myself. Patient is advised to follow up with PCP for reevaluation. I provided discharge instructions and return precautions. Patient and/or caregiver voices understanding and agreement with the treatment plan. All questions were addressed and answered.                   Problems Addressed:  Acute chest pain: acute illness or injury with systemic symptoms  Sickle cell pain crisis (HCC): acute illness or injury with systemic symptoms    Amount and/or Complexity of Data Reviewed  External Data Reviewed: notes.     Details: Stress echo from 2021 reviewed, patient had normal stress test with no ischemic changes.  Labs: ordered.  Decision-making details documented in ED Course.  Radiology: ordered and independent interpretation performed. Decision-making details documented in ED Course.  ECG/medicine tests: ordered and independent interpretation performed. Decision-making details documented in ED Course.    Risk  Parenteral controlled substances.  Decision regarding hospitalization.  Risk Details: IV Dilaudid        Disposition and Plan     Clinical Impression:  1. Sickle cell pain crisis (HCC)    2. Acute chest pain         Disposition:  Discharge  4/14/2024 11:27 pm    Follow-up:  Radha Ramos MD  [address] [place] [address] [id number] [phone]    Follow up            Medications Prescribed:  Discharge Medication List as of 4/14/2024 11:44 PM

## 2024-04-14 NOTE — ED QUICK NOTES
Pt presents to the ED for eval of CP. Pt reports mid-sternal pain x 3 days. States when she has the pain, she has a hard time breathing. Has been taking Tylenol and Norco with no relief. Denies any N/V. Hx SSC. Here for further eval.

## 2024-04-16 LAB
ATRIAL RATE: 58 BPM
ATRIAL RATE: 84 BPM
P AXIS: 31 DEGREES
P AXIS: 70 DEGREES
P-R INTERVAL: 120 MS
P-R INTERVAL: 122 MS
Q-T INTERVAL: 342 MS
Q-T INTERVAL: 404 MS
QRS DURATION: 72 MS
QRS DURATION: 74 MS
QTC CALCULATION (BEZET): 396 MS
QTC CALCULATION (BEZET): 404 MS
R AXIS: -11 DEGREES
R AXIS: -11 DEGREES
T AXIS: 13 DEGREES
T AXIS: 25 DEGREES
VENTRICULAR RATE: 58 BPM
VENTRICULAR RATE: 84 BPM

## 2024-04-23 ENCOUNTER — ANTI-COAG VISIT (OUTPATIENT)
Dept: ANTICOAGULATION | Facility: CLINIC | Age: 64
End: 2024-04-23

## 2024-04-23 ENCOUNTER — HOSPITAL ENCOUNTER (OUTPATIENT)
Dept: MRI IMAGING | Facility: HOSPITAL | Age: 64
Discharge: HOME | End: 2024-04-23
Attending: ORTHOPAEDIC SURGERY
Payer: COMMERCIAL

## 2024-04-23 DIAGNOSIS — M25.562 CHRONIC PAIN OF LEFT KNEE: ICD-10-CM

## 2024-04-23 DIAGNOSIS — D57.20 SICKLE CELL DISEASE, TYPE SC, WITHOUT CRISIS (HCC): Primary | ICD-10-CM

## 2024-04-23 DIAGNOSIS — Z79.01 MONITORING FOR LONG-TERM ANTICOAGULANT USE: ICD-10-CM

## 2024-04-23 DIAGNOSIS — G89.29 CHRONIC PAIN OF LEFT KNEE: ICD-10-CM

## 2024-04-23 DIAGNOSIS — D57.00 HB-SS DISEASE WITH CRISIS (HCC): ICD-10-CM

## 2024-04-23 DIAGNOSIS — Z51.81 MONITORING FOR LONG-TERM ANTICOAGULANT USE: ICD-10-CM

## 2024-04-23 DIAGNOSIS — M17.12 PRIMARY OSTEOARTHRITIS OF LEFT KNEE: ICD-10-CM

## 2024-04-23 LAB
INR: 3.9 (ref 0.8–1.2)
TEST STRIP EXPIRATION DATE: ABNORMAL DATE

## 2024-04-23 PROCEDURE — 85610 PROTHROMBIN TIME: CPT | Performed by: FAMILY MEDICINE

## 2024-04-23 PROCEDURE — 73721 MRI JNT OF LWR EXTRE W/O DYE: CPT | Performed by: ORTHOPAEDIC SURGERY

## 2024-04-23 PROCEDURE — 93793 ANTICOAG MGMT PT WARFARIN: CPT | Performed by: FAMILY MEDICINE

## 2024-04-23 NOTE — PROGRESS NOTES
TTR 58.6%     Face to face.      Denies any change to diet/meds. INR is minimally above therapeutic goal range.      Per protocol, take a partial dose of warfarin tonight, then continue current dose and recheck INR in 3 weeks.    4/23: 5 mg; Otherwise 5 mg every Mon, Wed, Fri; 7.5 mg all other days

## 2024-05-14 DIAGNOSIS — G89.29 OTHER CHRONIC PAIN: ICD-10-CM

## 2024-05-14 DIAGNOSIS — Z79.891 LONG TERM (CURRENT) USE OF OPIATE ANALGESIC: ICD-10-CM

## 2024-05-14 RX ORDER — HYDROCODONE BITARTRATE AND ACETAMINOPHEN 10; 325 MG/1; MG/1
TABLET ORAL
Qty: 180 TABLET | Refills: 0 | Status: SHIPPED | OUTPATIENT
Start: 2024-05-14

## 2024-05-14 NOTE — TELEPHONE ENCOUNTER
Protocol Failed; No Protocol   Recent fills: Quantity                                                                      04/09/2024  Qty: 60   written by Radha Ramos MD   02/16/2024  Qty: 90   written by Sandee Olson APRN  12/26/2023  Qty:90    written by Selin Dash APRN    Patient is due  Last Rx written: 04/09/2024  Last Office Visit: 04/09/2024  Please Advise.

## 2024-05-14 NOTE — TELEPHONE ENCOUNTER
Patient is requesting a medication refill    HYDROcodone    [facility] in [facility]

## 2024-05-16 DIAGNOSIS — Z79.891 LONG TERM (CURRENT) USE OF OPIATE ANALGESIC: ICD-10-CM

## 2024-05-16 DIAGNOSIS — G89.29 OTHER CHRONIC PAIN: ICD-10-CM

## 2024-05-16 NOTE — TELEPHONE ENCOUNTER
Pharmacy: Please verify this Rx we can this is a C2 Rx need other prescription to fill for 90 days we can only dispense 1 month at a time      HYDROcodone-acetaminophen  MG Oral Tab, 1 po bid prn Please dispense 3 month supply, Disp: 180 tablet, Rfl: 0

## 2024-05-17 ENCOUNTER — ANTI-COAG VISIT (OUTPATIENT)
Dept: ANTICOAGULATION | Facility: CLINIC | Age: 64
End: 2024-05-17

## 2024-05-17 DIAGNOSIS — Z79.01 MONITORING FOR LONG-TERM ANTICOAGULANT USE: ICD-10-CM

## 2024-05-17 DIAGNOSIS — D57.20 SICKLE CELL DISEASE, TYPE SC, WITHOUT CRISIS (HCC): Primary | ICD-10-CM

## 2024-05-17 DIAGNOSIS — Z51.81 MONITORING FOR LONG-TERM ANTICOAGULANT USE: ICD-10-CM

## 2024-05-17 DIAGNOSIS — D57.00 HB-SS DISEASE WITH CRISIS (HCC): ICD-10-CM

## 2024-05-17 LAB
INR: 2.3 (ref 2–3)
TEST STRIP EXPIRATION DATE: ABNORMAL DATE

## 2024-05-17 PROCEDURE — 85610 PROTHROMBIN TIME: CPT | Performed by: FAMILY MEDICINE

## 2024-05-17 PROCEDURE — 93793 ANTICOAG MGMT PT WARFARIN: CPT | Performed by: FAMILY MEDICINE

## 2024-05-17 NOTE — PROGRESS NOTES
Face-to-Face  / INR 2.3, sub therapeutic. (Goal 3.0 ) TTR 58.6 %     Etiology: Daphne's INR fluctuates quite a lot but the one venous she had was in range. This RN suggests she get some more venous INR's and see if her INR is more stable (meaning the POC device accuracy is affected by her Sickle Cell Dx)    PLAN: continue the current dose.    Recheck INR 4 weeks (appt made but INR orders available @  Lab)    Pt reports:   No sign of unusual bruising or bleeding.  Any missed doses: No   Medications changes: No    Contacted  Daphne by phone  who verbalized understanding and agreement.    WARFARIN Plan per protocol: 5 mg every Mon, Wed, Fri; 7.5 mg all other days

## 2024-05-20 NOTE — TELEPHONE ENCOUNTER
Spoke to patient. She has been out of medication for a week and the pharmacy keeps saying that they need clarification on the script from the doctor.    Dr. Ramos, Pod, please advise. Script from 5/14/24 says fill a 90 day supply and the pharmacy can only fill 30 day scripts for opioids. Pended script for quantity 60 which Is patient's normal script.    Dr. Ramos off today. Kim, are you able to assist with this? Patient has been out of medication for a week.

## 2024-05-21 RX ORDER — HYDROCODONE BITARTRATE AND ACETAMINOPHEN 10; 325 MG/1; MG/1
TABLET ORAL
Qty: 60 TABLET | Refills: 0 | Status: SHIPPED | OUTPATIENT
Start: 2024-05-21

## 2024-06-04 NOTE — TELEPHONE ENCOUNTER
Please review. Protocol Failed; No Protocol    Requested Prescriptions   Pending Prescriptions Disp Refills    FOLIC ACID 1 MG Oral Tab [Pharmacy Med Name: FOLIC ACID 1 MG TABLET] 90 tablet 3     Sig: TAKE 1 TABLET BY MOUTH EVERY DAY       There is no refill protocol information for this order            Future Appointments         Provider Department Appt Notes    In 1 week Kim Alejandre RN [facility]           Recent Outpatient Visits              1 month ago Other chronic pain    [facility] Radha Ramos MD    Office Visit    3 months ago [facility]    Office Visit    4 months ago Sickle cell disease, type sc, without crisis (HCC)    [facility] Matthew Chase MD    Office Visit    4 months ago Sickle cell disease, type sc, without crisis (HCC)    [facility]    Nurse Only    4 months ago Primary osteoarthritis of left knee    [facility]    Office Visit           Anesthesia Type: 1% lidocaine with epinephrine

## 2024-06-05 ENCOUNTER — TELEPHONE (OUTPATIENT)
Dept: ANTICOAGULATION | Facility: CLINIC | Age: 64
End: 2024-06-05

## 2024-06-05 DIAGNOSIS — D57.20 SICKLE CELL DISEASE, TYPE SC, WITHOUT CRISIS (HCC): Primary | ICD-10-CM

## 2024-06-05 DIAGNOSIS — Z51.81 MONITORING FOR LONG-TERM ANTICOAGULANT USE: ICD-10-CM

## 2024-06-05 DIAGNOSIS — D57.00 HB-SS DISEASE WITH CRISIS (HCC): ICD-10-CM

## 2024-06-05 DIAGNOSIS — Z79.01 MONITORING FOR LONG-TERM ANTICOAGULANT USE: ICD-10-CM

## 2024-06-05 RX ORDER — FOLIC ACID 1 MG/1
1 TABLET ORAL DAILY
Qty: 90 TABLET | Refills: 3 | Status: SHIPPED | OUTPATIENT
Start: 2024-06-05

## 2024-06-05 NOTE — TELEPHONE ENCOUNTER
Anticoag referral expires soon. Order pended. Please sign, thank you.    Dx: Sickle cell disease, type sc, without crisis (HCC) (D57.20)  Hb-SS disease with crisis (HCC) (D57.00)  Monitoring for long-term anticoagulant use (Z51.81,Z79.01

## 2024-06-07 RX ORDER — AMLODIPINE BESYLATE AND BENAZEPRIL HYDROCHLORIDE 10; 20 MG/1; MG/1
1 CAPSULE ORAL DAILY
Qty: 90 CAPSULE | Refills: 3 | Status: SHIPPED | OUTPATIENT
Start: 2024-06-07

## 2024-06-07 RX ORDER — AMLODIPINE BESYLATE AND BENAZEPRIL HYDROCHLORIDE 10; 20 MG/1; MG/1
1 CAPSULE ORAL DAILY
Qty: 90 CAPSULE | Refills: 1 | OUTPATIENT
Start: 2024-06-07

## 2024-06-07 NOTE — TELEPHONE ENCOUNTER
Patient calling; very low on  Rx:    Amlodipine    Verified  [id number] - [facility] [id number] AT [facility], [phone], [phone]

## 2024-06-07 NOTE — TELEPHONE ENCOUNTER
Refill Per Protocol     Requested Prescriptions   Pending Prescriptions Disp Refills    amLODIPine Besy-Benazepril HCl 10-20 MG Oral Cap 90 capsule 1     Sig: Take 1 capsule by mouth daily.       Hypertension Medications Protocol Passed - 6/4/2024  7:44 AM        Passed - CMP or BMP in past 12 months        Passed - Last BP reading less than 140/90     BP Readings from Last 1 Encounters:   04/14/24 127/78               Passed - In person appointment or virtual visit in the past 12 mos or appointment in next 3 mos     Recent Outpatient Visits              1 month ago [facility]    [facility]    3 months ago [facility] of left knee    [facility]    Office Visit    4 months ago Sickle cell disease, type sc, without crisis (HCC)    [facility] Matthew Chase MD    Office Visit    4 months ago Sickle cell disease, type sc, without crisis (HCC)    [facility]    Nurse Only    4 months ago Primary osteoarthritis of left knee    [facility]    Office Visit          Future Appointments         Provider Department Appt Notes    In 1 week Kim Alejandre RN [facility]                     Passed - EGFRCR or GFRAA > 50     GFR Evaluation  EGFRCR: 62 , resulted on 4/14/2024                 Future Appointments         Provider Department Appt Notes    In 1 week Kim Alejandre RN [facility]           Recent Outpatient Visits              [facility] month ago [facility]    [facility]    3 months ago Chronic pain of left knee    [facility]  Osmani Perez MD    Office Visit    4 months ago Sickle cell disease, type sc, without crisis (HCC)    [facility] Matthew Chase MD    Office Visit    4 months ago Sickle cell disease, type sc, without crisis (HCC)    [facility]    Nurse Only    4 months ago Primary osteoarthritis of left knee    [facility] Osmani Perez MD    Office Visit

## 2024-06-14 ENCOUNTER — ANTI-COAG VISIT (OUTPATIENT)
Dept: ANTICOAGULATION | Facility: CLINIC | Age: 64
End: 2024-06-14

## 2024-06-14 DIAGNOSIS — Z79.01 MONITORING FOR LONG-TERM ANTICOAGULANT USE: ICD-10-CM

## 2024-06-14 DIAGNOSIS — D57.20 SICKLE CELL DISEASE, TYPE SC, WITHOUT CRISIS (HCC): Primary | ICD-10-CM

## 2024-06-14 DIAGNOSIS — Z51.81 MONITORING FOR LONG-TERM ANTICOAGULANT USE: ICD-10-CM

## 2024-06-14 DIAGNOSIS — G89.29 OTHER CHRONIC PAIN: ICD-10-CM

## 2024-06-14 DIAGNOSIS — D57.00 HB-SS DISEASE WITH CRISIS (HCC): ICD-10-CM

## 2024-06-14 DIAGNOSIS — Z79.891 LONG TERM (CURRENT) USE OF OPIATE ANALGESIC: ICD-10-CM

## 2024-06-14 LAB
INR: 1.7 (ref 0.8–1.2)
TEST STRIP EXPIRATION DATE: ABNORMAL DATE

## 2024-06-14 PROCEDURE — 93793 ANTICOAG MGMT PT WARFARIN: CPT | Performed by: FAMILY MEDICINE

## 2024-06-14 PROCEDURE — 85610 PROTHROMBIN TIME: CPT | Performed by: FAMILY MEDICINE

## 2024-06-14 RX ORDER — OXYBUTYNIN CHLORIDE 15 MG/1
15 TABLET, EXTENDED RELEASE ORAL DAILY
Qty: 90 TABLET | Refills: 3 | Status: SHIPPED | OUTPATIENT
Start: 2024-06-14

## 2024-06-14 RX ORDER — OXYBUTYNIN CHLORIDE 5 MG/1
5 TABLET ORAL DAILY
Qty: 90 TABLET | Refills: 3 | Status: SHIPPED | OUTPATIENT
Start: 2024-06-14

## 2024-06-14 RX ORDER — FLUTICASONE PROPIONATE 50 MCG
2 SPRAY, SUSPENSION (ML) NASAL DAILY
Qty: 48 ML | Refills: 3 | Status: SHIPPED | OUTPATIENT
Start: 2024-06-14

## 2024-06-14 NOTE — PROGRESS NOTES
TTR 57.5%    Face to face.     Has been traveling the past couple weeks- eating different and likely missed a few doses of warfarin. INR has been variable the past several visits.     Discussed ordering a different pill strength and adjusting dose so that she is taking the same dose every night. She is agreeable to this- will assess with next INR result.     Per protocol, take an extra dose of warfarin tonight, then continue current dose and recheck INR in 2 weeks- states she will return in 1 month.     6/14: 10 mg; Otherwise 5 mg every Mon, Wed, Fri; 7.5 mg all other days

## 2024-06-14 NOTE — TELEPHONE ENCOUNTER
Refill passed per [facility] protocol.  Requested Prescriptions   Pending Prescriptions Disp Refills    OXYBUTYNIN 5 MG Oral Tab [Pharmacy Med Name: OXYBUTYNIN 5 MG TABLET] 90 tablet 3     Sig: Take 1 tablet (5 mg total) by mouth daily. Along with 15 mg       Genitourinary Medications Passed - 6/11/2024  8:43 AM        Passed - Patient does not have pulmonary hypertension on problem list        Passed - In person appointment or virtual visit in the past 12 mos or appointment in next 3 mos     Recent Outpatient Visits              2 months ago Other chronic pain    [facility], Radha Santana MD    Office Visit    3 months ago Chronic pain of left knee    [facility] [place]    Office Visit    4 months ago Sickle cell disease, type sc, without crisis (HCC)    [facility] Matthew Chase MD    Office Visit    4 months ago Sickle cell disease, type sc, without crisis (HCC)    [facility]    Nurse Only    4 months ago [facility] osteoarthritis of left knee    [facility]    [facility]          Future Appointments         Provider Department Appt Notes    In 4 weeks Kim Alejandre RN [facility]                       FLUTICASONE PROPIONATE 50 MCG/ACT Nasal Suspension [Pharmacy Med Name: FLUTICASONE PROP 50 MCG SPRAY] 48 mL 3     Sig: SPRAY 2 SPRAYS BY NASAL ROUTE DAILY       Allergy Medication Protocol Passed - 6/11/2024  8:43 AM        Passed - In person appointment or virtual visit in the past 12 mos or appointment in next 3 mos     Recent Outpatient Visits              2 months ago Other chronic pain    [facility], Radha Santana MD    Office Visit    3 months ago [facility] pain of left knee     [facility]    [facility] Visit    4 months ago Sickle cell disease, type sc, without crisis (HCC)    [facility] Matthew Chase MD    Office Visit    4 months ago Sickle cell disease, type sc, without crisis (HCC)    [facility]    Nurse Only    4 months ago Primary osteoarthritis of left knee    [facility]    [facility]          Future Appointments         Provider Department Appt Notes    In 4 weeks Kim Alejandre, RN [facility]                       OXYBUTYNIN CHLORIDE ER 15 MG Oral Tablet 24 Hr [Pharmacy Med Name: OXYBUTYNIN CL ER 15 MG TABLET] 90 tablet 3     Sig: Take 1 tablet (15 mg total) by mouth daily. Take with 1 tablet (5mg )daily for total of 20 mg daily.       Genitourinary Medications Passed - 6/11/2024  8:43 AM        Passed - Patient does not have pulmonary hypertension on problem list        Passed - In person appointment or virtual visit in the past 12 mos or appointment in next 3 mos     Recent Outpatient Visits              2 months ago Other chronic pain    [facility]    Office Visit    3 months ago Chronic pain of left knee    [facility]    [facility] Visit    4 months ago Sickle cell disease, type sc, without crisis (HCC)    [facility] Matthew Chase MD    Office Visit    4 months ago Sickle cell disease, type sc, without crisis (HCC)    [facility]    Nurse Only    4 months ago Primary osteoarthritis of left knee    [facility]    [facility]          Future Appointments         Provider Department Appt Notes     In 4 weeks Kim Alejandre RN [facility]                        Future Appointments         Provider Department Appt Notes    In 4 weeks Kim Alejandre RN [facility]           Recent Outpatient Visits              2 months ago Other chronic pain    [facility] Radha Ramos MD    Office Visit    3 months ago [facility] pain of left knee    [facility]    Office Visit    4 months ago Sickle cell disease, type sc, without crisis (HCC)    [facility] Matthew Chase MD    Office Visit    4 months ago Sickle cell disease, type sc, without crisis (HCC)    [facility]    Nurse Only    4 months ago Primary osteoarthritis of left knee    [facility] [place]    Office Visit

## 2024-06-14 NOTE — TELEPHONE ENCOUNTER
Patient calling to request refill, stated is completley out, verified [facility] pharmacy in [place].     Current Outpatient Medications   Medication Sig Dispense Refill    HYDROcodone-acetaminophen  MG Oral Tab 1 po bid prn 60 tablet 0

## 2024-06-14 NOTE — TELEPHONE ENCOUNTER
Please review.  Protocol failed / Has no protocol.    Norco 10mg Recent fills :  #90,  #60,  #60  DUE 24  Last prescription written: 24  Last office visit: 24     Requested Prescriptions   Pending Prescriptions Disp Refills    HYDROcodone-acetaminophen  MG Oral Tab 60 tablet 0     Si po bid prn       Controlled Substance Medication Failed - 2024  2:25 PM        Failed - This medication is a controlled substance - forward to provider to refill           Future Appointments         Provider Department Appt Notes    In 4 weeks Kim Alejandre, RN [facility]           Recent Outpatient Visits              2 months ago Other chronic pain    [facility] Radha Ramos MD    Office Visit    3 months ago Chronic pain of left knee    [facility] Osmani Perez MD    Office Visit    4 months ago Sickle cell disease, type sc, without crisis (HCC)    [facility] Matthew Chase MD    Office Visit    4 months ago Sickle cell disease, type sc, without crisis (HCC)    [facility]    Nurse Only    4 months ago Primary osteoarthritis of left knee    [facility] Osmani Perez MD    Office Visit

## 2024-06-14 NOTE — TELEPHONE ENCOUNTER
Dose changed 11/28/2023 to Oxybutynin 20mg daily take Oxybutynin 5mg and Oxybutynin ER 15mg daily to equal 20mg.

## 2024-06-15 RX ORDER — HYDROCODONE BITARTRATE AND ACETAMINOPHEN 10; 325 MG/1; MG/1
1 TABLET ORAL 2 TIMES DAILY PRN
Qty: 60 TABLET | Refills: 0 | Status: SHIPPED | OUTPATIENT
Start: 2024-06-15

## 2024-06-19 ENCOUNTER — NURSE TRIAGE (OUTPATIENT)
Dept: FAMILY MEDICINE CLINIC | Facility: CLINIC | Age: 64
End: 2024-06-19

## 2024-06-19 NOTE — TELEPHONE ENCOUNTER
Action Requested: Summary for Provider     []  Critical Lab, Recommendations Needed  [] Need Additional Advice  []   FYI    []   Need Orders  [] Need Medications Sent to Pharmacy  []  Other     SUMMARY: requesting appt only with Dr Ramos , left knee pain/swelling, mentioned Dr aware of the incident when fluid was drained out - months ago, advised no appointment until August , only video visits- please advise     Reason for call: Knee Pain  Onset: weeks                    Reason for Disposition   MODERATE pain (e.g., symptoms interfere with work or school, limping) and present > 3 days    Protocols used: Knee Pain-A-[place]

## 2024-06-19 NOTE — TELEPHONE ENCOUNTER
RN called patient. Patient's date of birth and full name both confirmed.   RN informed of provider's message as detailed .   She verbalizes understanding of all information, and agreeable to plan.   Appointment made - in office.     Future Appointments   Date Time Provider Department Center   6/27/2024  1:50 PM Radha Ramos MD [facility]   7/12/2024 10:00 AM Kim Alejandre RN [facility]

## 2024-06-27 ENCOUNTER — OFFICE VISIT (OUTPATIENT)
Dept: FAMILY MEDICINE CLINIC | Facility: CLINIC | Age: 64
End: 2024-06-27

## 2024-06-27 VITALS
SYSTOLIC BLOOD PRESSURE: 118 MMHG | WEIGHT: 194.38 LBS | HEIGHT: 62 IN | RESPIRATION RATE: 16 BRPM | DIASTOLIC BLOOD PRESSURE: 66 MMHG | OXYGEN SATURATION: 96 % | BODY MASS INDEX: 35.77 KG/M2 | HEART RATE: 75 BPM

## 2024-06-27 DIAGNOSIS — M25.561 RIGHT KNEE PAIN, UNSPECIFIED CHRONICITY: Primary | ICD-10-CM

## 2024-06-27 DIAGNOSIS — M25.562 LEFT KNEE PAIN, UNSPECIFIED CHRONICITY: ICD-10-CM

## 2024-06-27 PROCEDURE — 99213 OFFICE O/P EST LOW 20 MIN: CPT | Performed by: FAMILY MEDICINE

## 2024-06-27 PROCEDURE — 3078F DIAST BP <80 MM HG: CPT | Performed by: FAMILY MEDICINE

## 2024-06-27 PROCEDURE — 3008F BODY MASS INDEX DOCD: CPT | Performed by: FAMILY MEDICINE

## 2024-06-27 PROCEDURE — 3074F SYST BP LT 130 MM HG: CPT | Performed by: FAMILY MEDICINE

## 2024-06-27 NOTE — PROGRESS NOTES
Subjective:   Patient ID: Daphne Reynoso is a 64 year old female.    HPI  Continues with pain in the left knee   Wants a second opinion ortho   Had mri which showed severe arthritis and some meniscal changes     History/Other:   Review of Systems    Constitutional: Negative.  Negative for activity change, appetite change, diaphoresis and fatigue.     Respiratory: Negative.  Negative for apnea, cough, chest tightness and shortness of breath.    Cardiovascular: Negative.  Negative for chest pain, palpitations and leg swelling.   Gastrointestinal: Negative.  Negative for abdominal pain.   Skin: Negative.      MSK see hpi  Current Outpatient Medications   Medication Sig Dispense Refill    HYDROcodone-acetaminophen  MG Oral Tab Take 1 tablet by mouth 2 (two) times daily as needed for Pain. 60 tablet 0    oxybutynin 5 MG Oral Tab Take 1 tablet (5 mg total) by mouth daily. Along with 15 mg daily for total of 20mg daily. 90 tablet 3    fluticasone propionate 50 MCG/ACT Nasal Suspension 2 sprays by Nasal route daily. 48 mL 3    oxyBUTYnin Chloride ER 15 MG Oral Tablet 24 Hr Take 1 tablet (15 mg total) by mouth daily. Along with 5mg daily for total of 20 mg daily. 90 tablet 3    amLODIPine Besy-Benazepril HCl 10-20 MG Oral Cap Take 1 capsule by mouth daily. 90 capsule 3    folic acid 1 MG Oral Tab Take 1 tablet (1 mg total) by mouth daily. 90 tablet 3    warfarin 5 MG Oral Tab Take as directed by the INR clinic or take 1 tablet Monday Wednesday Friday, take 1 & 1/2 tablets all other days. 120 tablet 1    lidocaine 5 % External Patch PLACE 2 PATCHES ONTO THE SKIN AT NIGHT PLACE FOR 12 HOURS ON AND 12 HOURS OFF 60 patch 2    albuterol 108 (90 Base) MCG/ACT Inhalation Aero Soln Inhale 2 puffs into the lungs every 4 (four) hours as needed. 42.5 each 1    Calcium Citrate-Vitamin D 315-250 MG-UNIT Oral Tab Take 1 tablet by mouth daily.      Vitamin D3, Cholecalciferol, 10 MCG (400 UNIT) Oral Tab Take 2.5 tablets (1,000 Units  total) by mouth daily.       Allergies:  Allergies   Allergen Reactions    Codeine PALPITATIONS    Morphine PALPITATIONS    Opioid Analgesics SHORTNESS OF BREATH     heart races with the use of codeine  Itching and tachycardia      Sulfa Antibiotics SHORTNESS OF BREATH     Rash and tongue swells, itching  Other reaction(s): Swelling - oral/pharyngeal    Dust Mite Extract OTHER (SEE COMMENTS)     Stuffy nose, HA       Objective:   Physical Exam  Cardiovascular:      Rate and Rhythm: Normal rate and regular rhythm.      Pulses: Normal pulses.      Heart sounds: Normal heart sounds.   Pulmonary:      Effort: Pulmonary effort is normal.      Breath sounds: Normal breath sounds.   Musculoskeletal:         General: Swelling and tenderness present.      Cervical back: Normal range of motion.   Neurological:      Mental Status: She is alert.         Assessment & Plan:   1. Right knee pain, unspecified chronicity    2. Left knee pain, unspecified chronicity    Referral to ortho   Patient has medication to take prn   Also to start knee brace    No orders of the defined types were placed in this encounter.      Meds This Visit:  Requested Prescriptions      No prescriptions requested or ordered in this encounter       Imaging & Referrals:  ORTHOPEDIC - INTERNAL

## 2024-07-12 ENCOUNTER — ANTI-COAG VISIT (OUTPATIENT)
Dept: ANTICOAGULATION | Facility: CLINIC | Age: 64
End: 2024-07-12

## 2024-07-12 DIAGNOSIS — D57.20 SICKLE CELL DISEASE, TYPE SC, WITHOUT CRISIS (HCC): Primary | ICD-10-CM

## 2024-07-12 DIAGNOSIS — Z51.81 MONITORING FOR LONG-TERM ANTICOAGULANT USE: ICD-10-CM

## 2024-07-12 DIAGNOSIS — Z79.01 MONITORING FOR LONG-TERM ANTICOAGULANT USE: ICD-10-CM

## 2024-07-12 DIAGNOSIS — D57.00 HB-SS DISEASE WITH CRISIS (HCC): ICD-10-CM

## 2024-07-12 LAB
INR: 1.9 (ref 0.8–1.2)
TEST STRIP EXPIRATION DATE: ABNORMAL DATE

## 2024-07-12 PROCEDURE — 93793 ANTICOAG MGMT PT WARFARIN: CPT | Performed by: FAMILY MEDICINE

## 2024-07-12 PROCEDURE — 85610 PROTHROMBIN TIME: CPT | Performed by: FAMILY MEDICINE

## 2024-07-12 NOTE — PROGRESS NOTES
TTR 56.4%     Face to face.      Denies any change in diet/meds, no missed doses. INR has been variable and past two readings now have been subtherapeutic.     Per protocol, take an extra partial dose of warfarin tonight, increase weekly dose 5-10%- actual increase 11% and recheck INR in 3 weeks.     7/12: 10 mg; Otherwise 5 mg every Wed; 7.5 mg all other days

## 2024-07-29 DIAGNOSIS — G89.29 OTHER CHRONIC PAIN: ICD-10-CM

## 2024-07-29 DIAGNOSIS — Z79.891 LONG TERM (CURRENT) USE OF OPIATE ANALGESIC: ICD-10-CM

## 2024-07-29 RX ORDER — HYDROCODONE BITARTRATE AND ACETAMINOPHEN 10; 325 MG/1; MG/1
1 TABLET ORAL 2 TIMES DAILY PRN
Qty: 60 TABLET | Refills: 0 | Status: SHIPPED | OUTPATIENT
Start: 2024-07-29

## 2024-07-29 NOTE — TELEPHONE ENCOUNTER
Per patient, she needs refill on her Norco and Folic Acid and per patient she is out of medication, Please send to her pharmacy on file verified.    Current Outpatient Medications   Medication Sig Dispense Refill    HYDROcodone-acetaminophen  MG Oral Tab Take 1 tablet by mouth 2 (two) times daily as needed for Pain. 60 tablet 0                                folic acid 1 MG Oral Tab Take 1 tablet (1 mg total) by mouth daily. 90 tablet 3

## 2024-07-29 NOTE — TELEPHONE ENCOUNTER
Message noted: Chart reviewed and may refill medication as requested. Prescription sent to listed pharmacy. Further refills as per Dr. Ramos.

## 2024-07-29 NOTE — TELEPHONE ENCOUNTER
Routing to [employer] due to High Priority status and Dr. Ramos is out of office.    High priority request: Patient states she is out of her medication.    No future appointments with family medicine/internal medicine.  LAST OFFICE VISIT: 6/27/24    Recent fills (qty #60 ea for a 30 day supply): 6/18/24, 5/21/24, and 4/9/24  Last prescription written on: 6/15/24  Requested Prescriptions     Pending Prescriptions Disp Refills    HYDROcodone-acetaminophen  MG Oral Tab 60 tablet 0     Sig: Take 1 tablet by mouth 2 (two) times daily as needed for Pain.

## 2024-08-07 ENCOUNTER — ANTI-COAG VISIT (OUTPATIENT)
Dept: ANTICOAGULATION | Facility: CLINIC | Age: 64
End: 2024-08-07

## 2024-08-07 DIAGNOSIS — D57.20 SICKLE CELL DISEASE, TYPE SC, WITHOUT CRISIS (HCC): Primary | ICD-10-CM

## 2024-08-07 DIAGNOSIS — Z79.01 MONITORING FOR LONG-TERM ANTICOAGULANT USE: ICD-10-CM

## 2024-08-07 DIAGNOSIS — Z51.81 MONITORING FOR LONG-TERM ANTICOAGULANT USE: ICD-10-CM

## 2024-08-07 DIAGNOSIS — D57.00 HB-SS DISEASE WITH CRISIS (HCC): ICD-10-CM

## 2024-08-07 LAB
INR: 3.4 (ref 0.8–1.2)
TEST STRIP EXPIRATION DATE: ABNORMAL DATE

## 2024-08-07 PROCEDURE — 85610 PROTHROMBIN TIME: CPT | Performed by: ORTHOPAEDIC SURGERY

## 2024-08-07 PROCEDURE — 93793 ANTICOAG MGMT PT WARFARIN: CPT | Performed by: ORTHOPAEDIC SURGERY

## 2024-08-07 NOTE — PROGRESS NOTES
TTR 56.5%    Face to face.     Weekly dose was increased slightly at last visit and INR is now within goal range.     Per protocol, continue current dose and recheck INR in 4 weeks.     5 mg every Wed; 7.5 mg all other days

## 2024-08-28 DIAGNOSIS — Z79.891 LONG TERM (CURRENT) USE OF OPIATE ANALGESIC: ICD-10-CM

## 2024-08-28 DIAGNOSIS — G89.29 OTHER CHRONIC PAIN: ICD-10-CM

## 2024-08-28 RX ORDER — HYDROCODONE BITARTRATE AND ACETAMINOPHEN 10; 325 MG/1; MG/1
1 TABLET ORAL 2 TIMES DAILY PRN
Qty: 60 TABLET | Refills: 0 | Status: SHIPPED | OUTPATIENT
Start: 2024-08-28

## 2024-08-28 NOTE — TELEPHONE ENCOUNTER
Patient is requesting refill for HYDROcodone-acetaminophen  MG Oral Tab       [id number] [id number] AT [facility] [address] [facility], [phone], [phone]

## 2024-08-28 NOTE — TELEPHONE ENCOUNTER
Medication(s) to Refill:   Requested Prescriptions     Pending Prescriptions Disp Refills    HYDROcodone-acetaminophen  MG Oral Tab 60 tablet 0     Sig: Take 1 tablet by mouth 2 (two) times daily as needed for Pain.         Reason for Medication Refill being sent to Provider / Reason Protocol Failed:  [x] Controlled Substance       Quantity: 60  Last Time Medication was Filled:    07/29/2024 06/18/2024 05/21/2024  Last Time Medication was Written :  07/29/2024  Patient is due 08/29/2024  Last Office Visit : 06/27/2024

## 2024-09-04 ENCOUNTER — ANTI-COAG VISIT (OUTPATIENT)
Dept: ANTICOAGULATION | Facility: CLINIC | Age: 64
End: 2024-09-04

## 2024-09-04 DIAGNOSIS — D57.00 HB-SS DISEASE WITH CRISIS (HCC): ICD-10-CM

## 2024-09-04 DIAGNOSIS — D57.20 SICKLE CELL DISEASE, TYPE SC, WITHOUT CRISIS (HCC): Primary | ICD-10-CM

## 2024-09-04 DIAGNOSIS — Z79.01 MONITORING FOR LONG-TERM ANTICOAGULANT USE: ICD-10-CM

## 2024-09-04 DIAGNOSIS — Z51.81 MONITORING FOR LONG-TERM ANTICOAGULANT USE: ICD-10-CM

## 2024-09-04 LAB
INR: 3.2 (ref 2–3)
TEST STRIP EXPIRATION DATE: ABNORMAL DATE

## 2024-09-04 PROCEDURE — 93793 ANTICOAG MGMT PT WARFARIN: CPT | Performed by: FAMILY MEDICINE

## 2024-09-04 PROCEDURE — 85610 PROTHROMBIN TIME: CPT | Performed by: FAMILY MEDICINE

## 2024-09-04 NOTE — PROGRESS NOTES
Face-to-Face  / INR 3.2,  therapeutic. (Goal 3.0 ) TTR 57.3 %     Etiology: stable. No changes.    PLAN: continue the current dose.    Recheck INR 4 weeks.    Pt reports:    No sign of unusual bruising or bleeding.  Any missed doses: No   Medications changes: No    Contacted  [place] verbalized understanding and agreement.    WARFARIN Plan per protocol: 5 mg every Wed; 7.5 mg all other days

## 2024-10-02 ENCOUNTER — ANTI-COAG VISIT (OUTPATIENT)
Dept: ANTICOAGULATION | Facility: CLINIC | Age: 64
End: 2024-10-02

## 2024-10-02 DIAGNOSIS — D57.20 SICKLE CELL DISEASE, TYPE SC, WITHOUT CRISIS (HCC): Primary | ICD-10-CM

## 2024-10-02 DIAGNOSIS — Z79.01 MONITORING FOR LONG-TERM ANTICOAGULANT USE: ICD-10-CM

## 2024-10-02 DIAGNOSIS — D57.00 HB-SS DISEASE WITH CRISIS (HCC): ICD-10-CM

## 2024-10-02 DIAGNOSIS — Z51.81 MONITORING FOR LONG-TERM ANTICOAGULANT USE: ICD-10-CM

## 2024-10-02 LAB
INR: 1.9 (ref 0.8–1.2)
TEST STRIP EXPIRATION DATE: ABNORMAL DATE

## 2024-10-02 PROCEDURE — 93793 ANTICOAG MGMT PT WARFARIN: CPT | Performed by: FAMILY MEDICINE

## 2024-10-02 PROCEDURE — 85610 PROTHROMBIN TIME: CPT | Performed by: FAMILY MEDICINE

## 2024-10-02 NOTE — PROGRESS NOTES
TTR 57.2     Face to face.      Denies any missed doses, no change in diet/meds.     Per protocol, increase weekly dose 5-10%- actual increase 5% and recheck INR in 3 weeks.     7.5 mg every day

## 2024-10-03 DIAGNOSIS — Z79.891 LONG TERM (CURRENT) USE OF OPIATE ANALGESIC: ICD-10-CM

## 2024-10-03 DIAGNOSIS — G89.29 OTHER CHRONIC PAIN: ICD-10-CM

## 2024-10-03 RX ORDER — HYDROCODONE BITARTRATE AND ACETAMINOPHEN 10; 325 MG/1; MG/1
1 TABLET ORAL 2 TIMES DAILY PRN
Qty: 60 TABLET | Refills: 0 | Status: SHIPPED | OUTPATIENT
Start: 2024-10-03

## 2024-10-03 NOTE — TELEPHONE ENCOUNTER
Please review. Protocol Failed; No Protocol      Recent fills: 6/18/2024, 7/29/2024, 8/28/2024  Last Rx written: 8/28/2024  Last office visit: 6/27/2024          Requested Prescriptions   Pending Prescriptions Disp Refills    HYDROcodone-acetaminophen  MG Oral Tab 60 tablet 0     Sig: Take 1 tablet by mouth 2 (two) times daily as needed for Pain.       Controlled Substance Medication Failed - 10/3/2024  9:57 AM        Failed - This medication is a controlled substance - forward to provider to refill               Future Appointments         Provider Department Appt Notes    In 2 weeks Didi Israel, RN [facility]           Recent Outpatient Visits              3 months ago Right knee pain, unspecified chronicity    [facility] [place] [place] [facility]    Office Visit    5 months ago Other chronic pain    [facility] Radha Santana MD    Office Visit    7 months ago [facility]    [facility] Visit    8 months ago Sickle cell disease, type sc, without crisis (HCC)    [facility]cology Matthew Chase MD    Office Visit    8 months ago Sickle cell disease, type sc, without crisis (HCC)    [facility] Hematology Oncology    Nurse Only

## 2024-10-03 NOTE — TELEPHONE ENCOUNTER
Patient called, verified Name and . She is requesting for refill of hydrocodone. States she is out of pills. Last dispensed . Medication pended to run through protocol. Verified pharmacy.

## 2024-10-23 ENCOUNTER — ANTI-COAG VISIT (OUTPATIENT)
Dept: ANTICOAGULATION | Facility: CLINIC | Age: 64
End: 2024-10-23

## 2024-10-23 DIAGNOSIS — Z51.81 MONITORING FOR LONG-TERM ANTICOAGULANT USE: ICD-10-CM

## 2024-10-23 DIAGNOSIS — D57.20 SICKLE CELL DISEASE, TYPE SC, WITHOUT CRISIS (HCC): Primary | ICD-10-CM

## 2024-10-23 DIAGNOSIS — Z79.01 MONITORING FOR LONG-TERM ANTICOAGULANT USE: ICD-10-CM

## 2024-10-23 DIAGNOSIS — D57.00 HB-SS DISEASE WITH CRISIS (HCC): ICD-10-CM

## 2024-10-23 LAB
INR: 1.9 (ref 0.8–1.2)
TEST STRIP EXPIRATION DATE: ABNORMAL DATE

## 2024-10-23 PROCEDURE — 93793 ANTICOAG MGMT PT WARFARIN: CPT | Performed by: FAMILY MEDICINE

## 2024-10-23 PROCEDURE — 85610 PROTHROMBIN TIME: CPT | Performed by: FAMILY MEDICINE

## 2024-10-23 NOTE — PROGRESS NOTES
TTR 56.4%    Face to face. Here today with her spouse Sadie.     INR trending low the past two visits. Denies any missed doses, no change in meds, appetite is fair- states she will eat breakfast and be full most of the day. Discussed and educated on vitamin K foods- she does not eat any but would like to- advised to have a few small servings each week and keep consistent- this may help to improve her appetite if she is eating foods that she enjoys. Discussed we can adjust her warfarin dose based on a consistent diet.     Reports she continues to have knee pain- she saw Dr. Chang in July- advised to call his office to follow up on knee injections.     Per protocol, take an extra partial dose of warfarin tonight, increase weekly dose 5-10%- actual increase 9% and recheck INR in 2 weeks.     10/23: 10 mg; Otherwise 10 mg every Tue, Thu; 7.5 mg all other days

## 2024-11-05 ENCOUNTER — ANTI-COAG VISIT (OUTPATIENT)
Dept: ANTICOAGULATION | Facility: CLINIC | Age: 64
End: 2024-11-05

## 2024-11-05 DIAGNOSIS — Z79.01 MONITORING FOR LONG-TERM ANTICOAGULANT USE: ICD-10-CM

## 2024-11-05 DIAGNOSIS — Z51.81 MONITORING FOR LONG-TERM ANTICOAGULANT USE: ICD-10-CM

## 2024-11-05 DIAGNOSIS — D57.20 SICKLE CELL DISEASE, TYPE SC, WITHOUT CRISIS (HCC): Primary | ICD-10-CM

## 2024-11-05 DIAGNOSIS — D57.00 HB-SS DISEASE WITH CRISIS (HCC): ICD-10-CM

## 2024-11-05 LAB
INR: 2.6 (ref 0.8–1.2)
TEST STRIP EXPIRATION DATE: ABNORMAL DATE

## 2024-11-05 PROCEDURE — 85610 PROTHROMBIN TIME: CPT | Performed by: FAMILY MEDICINE

## 2024-11-05 PROCEDURE — 93793 ANTICOAG MGMT PT WARFARIN: CPT | Performed by: FAMILY MEDICINE

## 2024-11-05 NOTE — PROGRESS NOTES
TTR 56.1%     Face to face.      Per protocol, continue current dose (as adjusted at last visit) and recheck INR in 3 weeks.     10 mg every Tue, Thu; 7.5 mg all other days

## 2024-11-06 DIAGNOSIS — G89.29 OTHER CHRONIC PAIN: ICD-10-CM

## 2024-11-06 DIAGNOSIS — Z79.891 LONG TERM (CURRENT) USE OF OPIATE ANALGESIC: ICD-10-CM

## 2024-11-06 RX ORDER — HYDROCODONE BITARTRATE AND ACETAMINOPHEN 10; 325 MG/1; MG/1
1 TABLET ORAL 2 TIMES DAILY PRN
Qty: 60 TABLET | Refills: 0 | Status: SHIPPED | OUTPATIENT
Start: 2024-11-06

## 2024-11-06 NOTE — TELEPHONE ENCOUNTER
Patient calling to request refill, stated only has enough for today, [facility] [place]     Current Outpatient Medications   Medication Sig Dispense Refill    HYDROcodone-acetaminophen  MG Oral Tab Take 1 tablet by mouth 2 (two) times daily as needed for Pain. 60 tablet 0

## 2024-11-06 NOTE — TELEPHONE ENCOUNTER
Patient states only has enough for today     Recent Fills: 07/29/2024, 08/28/2024, 10/03/2024-quantity 60 for 30 day supply     Last Rx Written: 10/03/2024    Last Office Visit: 06/27/2024

## 2024-11-14 RX ORDER — WARFARIN SODIUM 5 MG/1
TABLET ORAL
Qty: 150 TABLET | Refills: 1 | Status: SHIPPED | OUTPATIENT
Start: 2024-11-14

## 2024-11-14 NOTE — TELEPHONE ENCOUNTER
Passed. WARFARIN refill Protocol.     Most Current dose:10 mg every Tue, Thu; 7.5 mg all other days

## 2024-11-29 ENCOUNTER — ANTI-COAG VISIT (OUTPATIENT)
Dept: ANTICOAGULATION | Facility: CLINIC | Age: 64
End: 2024-11-29

## 2024-11-29 DIAGNOSIS — Z51.81 MONITORING FOR LONG-TERM ANTICOAGULANT USE: ICD-10-CM

## 2024-11-29 DIAGNOSIS — Z79.01 MONITORING FOR LONG-TERM ANTICOAGULANT USE: ICD-10-CM

## 2024-11-29 DIAGNOSIS — D57.20 SICKLE CELL DISEASE, TYPE SC, WITHOUT CRISIS (HCC): Primary | ICD-10-CM

## 2024-11-29 DIAGNOSIS — D57.00 HB-SS DISEASE WITH CRISIS (HCC): ICD-10-CM

## 2024-11-29 LAB
INR: 2.9 (ref 0.8–1.2)
TEST STRIP EXPIRATION DATE: ABNORMAL DATE

## 2024-11-29 PROCEDURE — 85610 PROTHROMBIN TIME: CPT | Performed by: FAMILY MEDICINE

## 2024-11-29 PROCEDURE — 93793 ANTICOAG MGMT PT WARFARIN: CPT | Performed by: FAMILY MEDICINE

## 2024-11-29 NOTE — PROGRESS NOTES
TTR 56.7%     Face to face.      Per protocol, continue current dose and recheck INR in 4 weeks.     10 mg every Sun, Mon; 7.5 mg all other days

## 2024-12-06 DIAGNOSIS — Z79.891 LONG TERM (CURRENT) USE OF OPIATE ANALGESIC: ICD-10-CM

## 2024-12-06 DIAGNOSIS — G89.29 OTHER CHRONIC PAIN: ICD-10-CM

## 2024-12-06 RX ORDER — HYDROCODONE BITARTRATE AND ACETAMINOPHEN 10; 325 MG/1; MG/1
1 TABLET ORAL 2 TIMES DAILY PRN
Qty: 60 TABLET | Refills: 0 | Status: SHIPPED | OUTPATIENT
Start: 2024-12-06

## 2024-12-06 NOTE — TELEPHONE ENCOUNTER
Recent Fills: 08/28/2024, 10/03/2024, 11/06/2024    Last Rx Written: 11/06/2024    Last Office Visit: 06/27/2024

## 2024-12-06 NOTE — TELEPHONE ENCOUNTER
Patient called to request a refill on below medication.    [facility]       Medication Quantity Refills Start End   HYDROcodone-acetaminophen  MG Oral Tab 60 tablet 0 11/6/2024 --   Sig:   Take 1 tablet by mouth 2 (two) times daily as needed for Pain.     Route:   Oral     PRN Reason(s):   Pain     Earliest Fill Date:   11/6/2024     Order #:   [id number]

## 2024-12-27 ENCOUNTER — ANTI-COAG VISIT (OUTPATIENT)
Dept: ANTICOAGULATION | Facility: CLINIC | Age: 64
End: 2024-12-27

## 2024-12-27 ENCOUNTER — TELEPHONE (OUTPATIENT)
Dept: ANTICOAGULATION | Facility: CLINIC | Age: 64
End: 2024-12-27

## 2024-12-27 ENCOUNTER — LAB ENCOUNTER (OUTPATIENT)
Dept: LAB | Age: 64
End: 2024-12-27
Attending: FAMILY MEDICINE
Payer: COMMERCIAL

## 2024-12-27 DIAGNOSIS — D57.00 HB-SS DISEASE WITH CRISIS (HCC): ICD-10-CM

## 2024-12-27 DIAGNOSIS — Z51.81 MONITORING FOR LONG-TERM ANTICOAGULANT USE: ICD-10-CM

## 2024-12-27 DIAGNOSIS — D57.20 SICKLE CELL DISEASE, TYPE SC, WITHOUT CRISIS (HCC): Primary | ICD-10-CM

## 2024-12-27 DIAGNOSIS — D57.20 SICKLE CELL DISEASE, TYPE SC, WITHOUT CRISIS (HCC): ICD-10-CM

## 2024-12-27 DIAGNOSIS — Z79.01 MONITORING FOR LONG-TERM ANTICOAGULANT USE: ICD-10-CM

## 2024-12-27 LAB
INR BLD: 7.98 (ref 0.8–1.2)
INR: 6 (ref 0.8–1.2)
PROTHROMBIN TIME: 69.9 SECONDS (ref 11.6–14.8)
TEST STRIP EXPIRATION DATE: ABNORMAL DATE

## 2024-12-27 PROCEDURE — 93793 ANTICOAG MGMT PT WARFARIN: CPT | Performed by: FAMILY MEDICINE

## 2024-12-27 PROCEDURE — 85610 PROTHROMBIN TIME: CPT | Performed by: FAMILY MEDICINE

## 2024-12-27 PROCEDURE — 85610 PROTHROMBIN TIME: CPT

## 2024-12-27 PROCEDURE — 36415 COLL VENOUS BLD VENIPUNCTURE: CPT

## 2024-12-27 NOTE — TELEPHONE ENCOUNTER
Today's INR= 6.0- fingerstick in clinic office  Goal= 2.5-3.5      INR is supratherapeutic, she will go to lab for a peripheral blood draw to confirm the INR reading.     Denies any change in diet/meds. Reports she has been eating a little less and not as much green but no other changes.     Denies any unusual bruising, no blood in the urine/stool. Bleeding precautions reviewed. Advised to contact PCP for any changes and ED evaluation if she was to fall/or bump her head.     Per protocol, hold 3 doses of warfarin, decrease weekly dose- no change made yet- and await lab result for further instructions. Advised we will contact her with the lab result today or Monday.

## 2024-12-27 NOTE — TELEPHONE ENCOUNTER
Peripheral lab result came back higher than the fingerstick, lab result = 7.9.     RN spoke with Daphne and her spouse.     They checked her pill bottle at home and she has the correct 5 mg tablets. She repeated back the dose she is taking and it is the same as listed in the maintenance plan. Discussed anything new or different- her nor spouse can think of anything.     Reinforced instructions given in previous note regarding bleeding precautions and to be evaluated for any change in bleeding/bumping her head.     Per protocol, hold 3 doses of warfarin, decrease weekly dose 10-20%- actual decrease 8.7% and recheck INR on Monday 12/30. We may compare a fingerstick and peripheral again.

## 2024-12-27 NOTE — PROGRESS NOTES
Peripheral lab result came back higher than the fingerstick, lab result = 7.9.     RN spoke with Daphne and her spouse.     They checked her pill bottle at home and she has the correct 5 mg tablets. She repeated back the dose she is taking and it is the same as listed in the maintenance plan. Discussed anything new or different- her nor spouse can think of anything.     Reinforced instructions given in previous note regarding bleeding precautions and to be evaluated for any change in bleeding/bumping her head.     Per protocol, hold 3 doses of warfarin, decrease weekly dose 10-20%- actual decrease 8.7% and recheck INR on Monday 12/30. We may compare a fingerstick and peripheral again.     12/27: Hold; 12/28: Hold; 12/29: Hold; Otherwise 7.5 mg every day

## 2024-12-27 NOTE — PROGRESS NOTES
TTR 56.1%    Face to face.     INR is supratherapeutic, will go to lab for a peripheral blood draw to confirm the INR reading.     Denies any change in diet/meds. Reports she has been eating a little less and not as much green but no other changes.     Denies any unusual bruising, no blood in the urine/stool. Bleeding precautions reviewed. Advised to contact PCP for any changes and ED evaluation if she was to fall/or bump her head.     Per protocol, hold 3 doses of warfarin, decrease weekly dose- no change made yet- and await lab result for further instructions. Advised we will contact her with the lab result today or Monday.     12/27: Hold; 12/28: Hold; 12/29: Hold; Otherwise 7.5 mg every day

## 2024-12-30 ENCOUNTER — ANTI-COAG VISIT (OUTPATIENT)
Dept: ANTICOAGULATION | Facility: CLINIC | Age: 64
End: 2024-12-30

## 2024-12-30 DIAGNOSIS — Z79.01 MONITORING FOR LONG-TERM ANTICOAGULANT USE: ICD-10-CM

## 2024-12-30 DIAGNOSIS — Z51.81 MONITORING FOR LONG-TERM ANTICOAGULANT USE: ICD-10-CM

## 2024-12-30 DIAGNOSIS — D57.20 SICKLE CELL DISEASE, TYPE SC, WITHOUT CRISIS (HCC): Primary | ICD-10-CM

## 2024-12-30 DIAGNOSIS — D57.00 HB-SS DISEASE WITH CRISIS (HCC): ICD-10-CM

## 2024-12-30 LAB
INR: 1.5 (ref 2.5–3.5)
TEST STRIP EXPIRATION DATE: ABNORMAL DATE

## 2024-12-30 PROCEDURE — 93793 ANTICOAG MGMT PT WARFARIN: CPT | Performed by: FAMILY MEDICINE

## 2024-12-30 PROCEDURE — 85610 PROTHROMBIN TIME: CPT | Performed by: FAMILY MEDICINE

## 2024-12-30 NOTE — PROGRESS NOTES
Face-to-Face  / INR 1.5, sub therapeutic. (Goal 3.0 ) TTR 56.0 %         Etiology: Daphne is feeling and looking fine. She hasn't figured out why her INR shot up so high last week, and neither have we RN's. No etoh, no double dosing, no CBD or anything related to that. No otc tylenol, just her usual medications. She is having problems with her knees. (Is it possible a flare up of S/Cell could impact INR?) (No bruising associated with previous INR reading)    PLAN: resume at 7.5mg daily.    Recheck INR 3 days.    Pt reports:    No sign of unusual bruising or bleeding.  Any missed doses: Yes 3 doses.   Medications changes: No  Diet changes: She had about 1 cup of alonzo greens on Friday.     Contacted  Daphne verbalized understanding and agreement.    WARFARIN Plan per protocol: 7.5 mg every day

## 2025-01-02 ENCOUNTER — ANTI-COAG VISIT (OUTPATIENT)
Dept: ANTICOAGULATION | Facility: CLINIC | Age: 65
End: 2025-01-02

## 2025-01-02 DIAGNOSIS — Z79.01 MONITORING FOR LONG-TERM ANTICOAGULANT USE: ICD-10-CM

## 2025-01-02 DIAGNOSIS — D57.00 HB-SS DISEASE WITH CRISIS (HCC): ICD-10-CM

## 2025-01-02 DIAGNOSIS — D57.20 SICKLE CELL DISEASE, TYPE SC, WITHOUT CRISIS (HCC): Primary | ICD-10-CM

## 2025-01-02 DIAGNOSIS — Z51.81 MONITORING FOR LONG-TERM ANTICOAGULANT USE: ICD-10-CM

## 2025-01-02 LAB
INR: 2.1 (ref 2–3)
TEST STRIP EXPIRATION DATE: ABNORMAL DATE

## 2025-01-02 PROCEDURE — 85610 PROTHROMBIN TIME: CPT | Performed by: FAMILY MEDICINE

## 2025-01-02 PROCEDURE — 93793 ANTICOAG MGMT PT WARFARIN: CPT | Performed by: FAMILY MEDICINE

## 2025-01-02 NOTE — PROGRESS NOTES
Face-to-Face  / INR 2.1, sub therapeutic. (Goal 3.0 ) TTR 55.9 %     Etiology: we are recovering from a 7.98 venous INR (which was higher than the fingerstick of 6.0) this is an unusual result based on known variables. No known reason for the elevated readings identified. She does express painful knees at the time.     PLAN: take 10mg today then 7.5mg all other days.    Recheck INR one week.     Pt reports:    No sign of unusual bruising or bleeding.  Any missed doses: No   Medications changes: No  Diet changes:  No    Contacted  [place] verbalized understanding and agreement.    WARFARIN Plan per protocol: 1/2: 10 mg; Otherwise 7.5 mg every day

## 2025-01-08 DIAGNOSIS — Z79.891 LONG TERM (CURRENT) USE OF OPIATE ANALGESIC: ICD-10-CM

## 2025-01-08 DIAGNOSIS — G89.29 OTHER CHRONIC PAIN: ICD-10-CM

## 2025-01-08 RX ORDER — HYDROCODONE BITARTRATE AND ACETAMINOPHEN 10; 325 MG/1; MG/1
1 TABLET ORAL 2 TIMES DAILY PRN
Qty: 60 TABLET | Refills: 0 | Status: SHIPPED | OUTPATIENT
Start: 2025-01-08

## 2025-01-08 NOTE — TELEPHONE ENCOUNTER
Patient is requesting refill for     HYDROcodone-acetaminophen  MG Oral Tab         [id number] [id number] AT [address] [facility], [phone], [phone]

## 2025-01-08 NOTE — TELEPHONE ENCOUNTER
Requested Prescriptions     Pending Prescriptions Disp Refills    HYDROcodone-acetaminophen  MG Oral Tab 60 tablet 0     Sig: Take 1 tablet by mouth 2 (two) times daily as needed for Pain.         Reason for Medication Refill being sent to Provider / Reason Protocol Failed:  [x] Controlled Substance       Quantity: 60  Last Time Medication was Filled:  12/06/2024  Last Time Medication was Written : 12/06/2024      Last Office Visit : 06/27/2024

## 2025-01-09 ENCOUNTER — ANTI-COAG VISIT (OUTPATIENT)
Dept: ANTICOAGULATION | Facility: CLINIC | Age: 65
End: 2025-01-09

## 2025-01-09 DIAGNOSIS — D57.00 HB-SS DISEASE WITH CRISIS (HCC): ICD-10-CM

## 2025-01-09 DIAGNOSIS — D57.20 SICKLE CELL DISEASE, TYPE SC, WITHOUT CRISIS (HCC): Primary | ICD-10-CM

## 2025-01-09 DIAGNOSIS — Z51.81 MONITORING FOR LONG-TERM ANTICOAGULANT USE: ICD-10-CM

## 2025-01-09 DIAGNOSIS — Z79.01 MONITORING FOR LONG-TERM ANTICOAGULANT USE: ICD-10-CM

## 2025-01-09 LAB
INR: 4 (ref 2.5–3.5)
TEST STRIP EXPIRATION DATE: ABNORMAL DATE

## 2025-01-09 PROCEDURE — 93793 ANTICOAG MGMT PT WARFARIN: CPT | Performed by: FAMILY MEDICINE

## 2025-01-09 PROCEDURE — 85610 PROTHROMBIN TIME: CPT | Performed by: FAMILY MEDICINE

## 2025-01-09 NOTE — PROGRESS NOTES
Face-to-Face  / INR 4.0, supra therapeutic. (Goal 3.0 ) TTR 55.9 %     Etiology: huge! Change in her dose from November to December!? We have not figured out why. Denies medication changes or health changes. Knees have been bothering her more but not using extra tylenol. Diet might be different.     PLAN: reduce today to 2.5mg then Sundays reduce to 5mg.     Recheck INR 2 weeks.    Pt reports:    No sign of unusual bruising or bleeding.  Any missed doses: No   Medications changes: No  Diet changes: Winter diet ?    Contacted  Daphne verbalized understanding and agreement.    WARFARIN Plan per protocol: 1/9: 2.5 mg; Otherwise 5 mg every Sun; 7.5 mg all other days

## 2025-01-14 ENCOUNTER — OFFICE VISIT (OUTPATIENT)
Dept: FAMILY MEDICINE CLINIC | Facility: CLINIC | Age: 65
End: 2025-01-14

## 2025-01-14 VITALS
TEMPERATURE: 98 F | OXYGEN SATURATION: 96 % | SYSTOLIC BLOOD PRESSURE: 133 MMHG | RESPIRATION RATE: 20 BRPM | BODY MASS INDEX: 34.96 KG/M2 | HEART RATE: 64 BPM | WEIGHT: 190 LBS | DIASTOLIC BLOOD PRESSURE: 68 MMHG | HEIGHT: 62 IN

## 2025-01-14 DIAGNOSIS — D57.1 SICKLE CELL DISEASE WITHOUT CRISIS (HCC): ICD-10-CM

## 2025-01-14 DIAGNOSIS — Z12.31 SCREENING MAMMOGRAM FOR BREAST CANCER: Primary | ICD-10-CM

## 2025-01-14 DIAGNOSIS — M25.562 LEFT KNEE PAIN, UNSPECIFIED CHRONICITY: ICD-10-CM

## 2025-01-14 DIAGNOSIS — Z00.00 ANNUAL PHYSICAL EXAM: ICD-10-CM

## 2025-01-14 RX ORDER — PANTOPRAZOLE SODIUM 40 MG/1
40 TABLET, DELAYED RELEASE ORAL
Qty: 30 TABLET | Refills: 0 | Status: SHIPPED | OUTPATIENT
Start: 2025-01-14 | End: 2026-01-14

## 2025-01-14 NOTE — PROGRESS NOTES
Subjective:   Patient ID: Daphne Reynoso is a 64 year old female.    HPI  Patient here for annual physical. Patient with sickle cell disease which is under control   Has to take vicodin daily but had only one episode that need to get iv hydration   Patient for f/u hypertension   Denies any chest pain shortness of breath or headaches.   Monitoring blood pressure at home and is below 135/85. Needs refill of medications.   Also complaining about some minimal pain in epigastric area.       History/Other:   Review of Systems    Constitutional: Negative.  Negative for activity change, appetite change, diaphoresis and fatigue.     Respiratory: Negative.  Negative for apnea, cough, chest tightness and shortness of breath.    Cardiovascular: Negative.  Negative for chest pain, palpitations and leg swelling.   Gastrointestinal: Negative.  Negative for abdominal pain.   Skin: Negative.    Muscle aches daily and bone aches but controlled with vicodin    left knee pain continues      Psychiatric/Behavioral: Negative.          Current Outpatient Medications   Medication Sig Dispense Refill    pantoprazole 40 MG Oral Tab EC Take 1 tablet (40 mg total) by mouth every morning before breakfast. 30 tablet 0    HYDROcodone-acetaminophen  MG Oral Tab Take 1 tablet by mouth 2 (two) times daily as needed for Pain. 60 tablet 0    warfarin 5 MG Oral Tab Take as directed by the INR clinic or take 2 tabs Tuesdays & Thursdays, take 1 & 1/2 tab all other days. 150 tablet 1    oxybutynin 5 MG Oral Tab Take 1 tablet (5 mg total) by mouth daily. Along with 15 mg daily for total of 20mg daily. 90 tablet 3    fluticasone propionate 50 MCG/ACT Nasal Suspension 2 sprays by Nasal route daily. 48 mL 3    amLODIPine Besy-Benazepril HCl 10-20 MG Oral Cap Take 1 capsule by mouth daily. 90 capsule 3    folic acid 1 MG Oral Tab Take 1 tablet (1 mg total) by mouth daily. 90 tablet 3    lidocaine 5 % External Patch PLACE 2 PATCHES ONTO THE SKIN AT NIGHT  PLACE FOR 12 HOURS ON AND 12 HOURS OFF 60 patch 2    albuterol 108 (90 Base) MCG/ACT Inhalation Aero Soln Inhale 2 puffs into the lungs every 4 (four) hours as needed. 42.5 each 1    Calcium Citrate-Vitamin D 315-250 MG-UNIT Oral Tab Take 1 tablet by mouth daily.      Vitamin D3, Cholecalciferol, 10 MCG (400 UNIT) Oral Tab Take 2.5 tablets (1,000 Units total) by mouth daily.       Allergies:Allergies[1]    Objective:   Physical Exam  Constitutional:       Appearance: She is well-developed.   Cardiovascular:      Rate and Rhythm: Normal rate and regular rhythm.      Heart sounds: Normal heart sounds.   Pulmonary:      Effort: Pulmonary effort is normal.      Breath sounds: Normal breath sounds.   Abdominal:      General: Bowel sounds are normal.      Palpations: Abdomen is soft.      Tenderness: There is abdominal tenderness.      Comments: Minimal tenderness in epigastric area   Musculoskeletal:         General: Tenderness present.   Neurological:      Mental Status: She is alert.      Deep Tendon Reflexes: Reflexes are normal and symmetric.         Assessment & Plan:   1. Screening mammogram for breast cancer    2. Annual physical exam    3. Sickle cell disease without crisis (HCC)    4. Left knee pain, unspecified chronicity    Will need to do blood test   Also needs f/u with dr Chase and dr Chang   5. Epigastric pain - start protonix   F/u in 2 -3 weeks   Orders Placed This Encounter   Procedures    Comp Metabolic Panel (14)    Lipid Panel    CBC With Differential With Platelet    Assay, Thyroid Stim Hormone    Fluzone trivalent vaccine, PF 0.5mL, 6mo+ (72200)    Prevnar 20 (PCV20) [12731]       Meds This Visit:  Requested Prescriptions     Signed Prescriptions Disp Refills    pantoprazole 40 MG Oral Tab EC 30 tablet 0     Sig: Take 1 tablet (40 mg total) by mouth every morning before breakfast.       Imaging & Referrals:  INFLUENZA VACCINE, TRI, PRESERV FREE, 0.5 ML  PCV20 VACCINE FOR INTRAMUSCULAR USE  OP  REFERRAL TO HEMATOLOGY/ONCOLOGY GROUP  [facility] 2D+3D SCREENING BILAT (CPT=77067/25118)         [1]   Allergies  Allergen Reactions    Codeine PALPITATIONS    Morphine PALPITATIONS    Opioid Analgesics SHORTNESS OF BREATH     heart races with the use of codeine  Itching and tachycardia      Sulfa Antibiotics SHORTNESS OF BREATH     Rash and tongue swells, itching  Other reaction(s): Swelling - oral/pharyngeal    Dust Mite Extract OTHER (SEE COMMENTS)     Stuffy nose, HA

## 2025-01-23 ENCOUNTER — ANTI-COAG VISIT (OUTPATIENT)
Dept: ANTICOAGULATION | Facility: CLINIC | Age: 65
End: 2025-01-23

## 2025-01-23 DIAGNOSIS — Z79.01 MONITORING FOR LONG-TERM ANTICOAGULANT USE: ICD-10-CM

## 2025-01-23 DIAGNOSIS — D57.20 SICKLE CELL DISEASE, TYPE SC, WITHOUT CRISIS (HCC): Primary | ICD-10-CM

## 2025-01-23 DIAGNOSIS — D57.00 HB-SS DISEASE WITH CRISIS (HCC): ICD-10-CM

## 2025-01-23 DIAGNOSIS — Z51.81 MONITORING FOR LONG-TERM ANTICOAGULANT USE: ICD-10-CM

## 2025-01-23 LAB
INR: 3.2 (ref 0.8–1.2)
TEST STRIP EXPIRATION DATE: ABNORMAL DATE

## 2025-01-23 PROCEDURE — 93793 ANTICOAG MGMT PT WARFARIN: CPT | Performed by: FAMILY MEDICINE

## 2025-01-23 PROCEDURE — 85610 PROTHROMBIN TIME: CPT | Performed by: FAMILY MEDICINE

## 2025-01-23 NOTE — PROGRESS NOTES
TTR 55.8%    Face to face.     Per protocol, continue current dose and recheck INR in 3 weeks.     5 mg every Sun; 7.5 mg all other days

## 2025-02-05 NOTE — TELEPHONE ENCOUNTER
Pharmacy: 90 day request        pantoprazole 40 MG Oral Tab EC, Take 1 tablet (40 mg total) by mouth every morning before breakfast., Disp: 30 tablet, Rfl: 0

## 2025-02-10 NOTE — TELEPHONE ENCOUNTER
Office visit note on 01/14/2025 with Dr. Ramos:  5. Epigastric pain - start protonix   F/u in 2 -3 weeks

## 2025-02-10 NOTE — TELEPHONE ENCOUNTER
Refill passed per [facility] protocol.    Patient given a 30 day supply     Per office visit note on 01/14/2024 patient to follow up in 2-3 weeks- please advise if refill is appropriate.     Sent [employer] message to patient to schedule follow up visit.     Requested Prescriptions   Pending Prescriptions Disp Refills    pantoprazole 40 MG Oral Tab EC 30 tablet 0     Sig: Take 1 tablet (40 mg total) by mouth every morning before breakfast.       Gastrointestional Medication Protocol Passed - 2/10/2025  9:17 AM        Passed - In person appointment or virtual visit in the past 12 mos or appointment in next 3 mos     Recent Outpatient Visits              3 weeks ago [facility]    Office Visit    7 months ago Right knee pain, unspecified chronicity    [facility]    [facility]    10 months ago Other chronic pain    [facility]    [facility] Visit    11 months ago [facility] pain of left knee    [facility]    [facility]    1 year ago Sickle cell disease, type sc, without crisis (HCC)    [facility] Hematology Oncology Matthew Chase MD    Office Visit          Future Appointments         Provider Department Appt Notes    Tomorrow Kim Alejandre RN [facility]                     Passed - Medication is active on med list           Future Appointments         Provider Department Appt Notes    Tomorrow Kim Alejandre RN [facility]           Recent Outpatient Visits              3 weeks ago [facility]    [facility] Visit     7 months ago Right knee pain, unspecified chronicity    [facility], Radha Santana MD    Office Visit    10 months ago Other chronic pain    [facility], Radha Santana MD    Office Visit    11 months ago Chronic pain of left knee    [facility], Osmani Jeronimo MD    Office Visit    1 year ago Sickle cell disease, type sc, without crisis (HCC)    [facility] Hematology Oncology Matthew Chase MD    Office Visit

## 2025-02-11 ENCOUNTER — ANTI-COAG VISIT (OUTPATIENT)
Dept: ANTICOAGULATION | Facility: CLINIC | Age: 65
End: 2025-02-11

## 2025-02-11 DIAGNOSIS — D57.20 SICKLE CELL DISEASE, TYPE SC, WITHOUT CRISIS (HCC): Primary | ICD-10-CM

## 2025-02-11 DIAGNOSIS — Z79.01 MONITORING FOR LONG-TERM ANTICOAGULANT USE: ICD-10-CM

## 2025-02-11 DIAGNOSIS — Z51.81 MONITORING FOR LONG-TERM ANTICOAGULANT USE: ICD-10-CM

## 2025-02-11 DIAGNOSIS — D57.00 HB-SS DISEASE WITH CRISIS (HCC): ICD-10-CM

## 2025-02-11 LAB
INR: 2.4 (ref 0.8–1.2)
TEST STRIP EXPIRATION DATE: ABNORMAL DATE

## 2025-02-11 PROCEDURE — 85610 PROTHROMBIN TIME: CPT | Performed by: FAMILY MEDICINE

## 2025-02-11 PROCEDURE — 93793 ANTICOAG MGMT PT WARFARIN: CPT | Performed by: FAMILY MEDICINE

## 2025-02-11 RX ORDER — PANTOPRAZOLE SODIUM 40 MG/1
40 TABLET, DELAYED RELEASE ORAL
Qty: 90 TABLET | Refills: 0 | Status: SHIPPED | OUTPATIENT
Start: 2025-02-11

## 2025-02-11 NOTE — PROGRESS NOTES
TTR 56.1%     Face to face.      Per protocol, continue current dose and recheck INR in 4 weeks.     5 mg every Sun; 7.5 mg all other days

## 2025-02-12 DIAGNOSIS — Z79.891 LONG TERM (CURRENT) USE OF OPIATE ANALGESIC: ICD-10-CM

## 2025-02-12 DIAGNOSIS — G89.29 OTHER CHRONIC PAIN: ICD-10-CM

## 2025-02-12 NOTE — TELEPHONE ENCOUNTER
Patient called to request a refill on below medication.    [facility]       Medication Quantity Refills Start End   HYDROcodone-acetaminophen  MG Oral Tab 60 tablet 0 1/8/2025 --   Sig:   Take 1 tablet by mouth 2 (two) times daily as needed for Pain.     Route:   Oral     Note to Pharmacy:   Needs to make an apt for further refills     PRN Reason(s):   Pain     Earliest Fill Date:   1/8/2025     Order #:   [id number]

## 2025-02-17 RX ORDER — HYDROCODONE BITARTRATE AND ACETAMINOPHEN 10; 325 MG/1; MG/1
1 TABLET ORAL 2 TIMES DAILY PRN
Qty: 60 TABLET | Refills: 0 | Status: SHIPPED | OUTPATIENT
Start: 2025-02-17 | End: 2025-03-24

## 2025-02-17 NOTE — TELEPHONE ENCOUNTER
The patient called requesting an update on this refill - states she is out. Last office visit was 01/14/2025

## 2025-02-17 NOTE — TELEPHONE ENCOUNTER
Please review. Protocol Failed; No Protocol    Routing to podmates due to High Priority status and Dr. Ramos is out of office.        Recent fills: 12/6/2024, 1/8/2025  Last Rx written: 1/8/2025  Last office visit: 1/14/2025          Requested Prescriptions   Pending Prescriptions Disp Refills    HYDROcodone-acetaminophen  MG Oral Tab 60 tablet 0     Sig: Take 1 tablet by mouth 2 (two) times daily as needed for Pain.       Controlled Substance Medication Failed - 2/17/2025  9:10 AM        Failed - This medication is a controlled substance - forward to provider to refill        Passed - Medication is active on med list               Future Appointments         Provider Department Appt Notes    In 3 weeks [facility]           Recent Outpatient Visits              1 month ago [facility]    Office Visit    7 months ago Right knee pain, unspecified chronicity    [facility]    Office Visit    10 months ago Other chronic pain    [facility]    Office Visit    11 months ago Chronic pain of left knee    [facility]    Office Visit    1 year ago Sickle cell disease, type sc, without crisis (HCC)    [facility] Hematology Oncology Matthew Chase MD    Office Visit

## 2025-03-04 RX ORDER — AMLODIPINE AND BENAZEPRIL HYDROCHLORIDE 10; 20 MG/1; MG/1
1 CAPSULE ORAL DAILY
Qty: 90 CAPSULE | Refills: 3 | Status: SHIPPED | OUTPATIENT
Start: 2025-03-04

## 2025-03-04 NOTE — TELEPHONE ENCOUNTER
Refill Per Protocol     Requested Prescriptions   Pending Prescriptions Disp Refills    AMLODIPINE BESY-BENAZEPRIL HCL 10-20 MG Oral Cap [Pharmacy Med Name: AMLODIPINE-BENAZEPRIL 10-20 MG] 90 capsule 3     Sig: TAKE 1 CAPSULE BY MOUTH EVERY DAY       Hypertension Medications Protocol Passed - 3/4/2025  4:24 PM        Passed - CMP or BMP in past 12 months        Passed - Last BP reading less than 140/90     BP Readings from Last 1 Encounters:   01/14/25 133/68               Passed - In person appointment or virtual visit in the past 12 mos or appointment in next 3 mos     Recent Outpatient Visits              1 month ago [facility] mamm[facility] cancer    [facility]    Office Visit    8 months ago Right knee pain, unspecified chronicity    [facility]    Office Visit    10 months ago Other chronic pain    [facility]    Office Visit    12 months ago Chronic pain of left knee    [facility]    Office Visit    1 year ago Sickle cell disease, type sc, without crisis (HCC)    [facility] Hematology Oncology Matthew Chase MD    Office Visit          Future Appointments         Provider Department Appt Notes    In 1 week Kim Alejandre RN [facility]                     Passed - EGFRCR or GFRAA > 50     GFR Evaluation  EGFRCR: 62 , resulted on 4/14/2024          Passed - Medication is active on med list

## 2025-03-11 ENCOUNTER — ANTI-COAG VISIT (OUTPATIENT)
Dept: ANTICOAGULATION | Facility: CLINIC | Age: 65
End: 2025-03-11

## 2025-03-11 DIAGNOSIS — Z51.81 MONITORING FOR LONG-TERM ANTICOAGULANT USE: ICD-10-CM

## 2025-03-11 DIAGNOSIS — D57.20 SICKLE CELL DISEASE, TYPE SC, WITHOUT CRISIS (HCC): Primary | ICD-10-CM

## 2025-03-11 DIAGNOSIS — Z79.01 MONITORING FOR LONG-TERM ANTICOAGULANT USE: ICD-10-CM

## 2025-03-11 DIAGNOSIS — D57.00 HB-SS DISEASE WITH CRISIS (HCC): ICD-10-CM

## 2025-03-11 LAB — INR: 2.5 (ref 0.8–1.2)

## 2025-03-11 PROCEDURE — 93793 ANTICOAG MGMT PT WARFARIN: CPT | Performed by: FAMILY MEDICINE

## 2025-03-11 PROCEDURE — 85610 PROTHROMBIN TIME: CPT | Performed by: FAMILY MEDICINE

## 2025-03-11 NOTE — PROGRESS NOTES
TTR 55.2%     Face to face.      Per protocol, continue current dose and recheck INR in 4 weeks.     5 mg every Sun; 7.5 mg all other days

## 2025-03-24 DIAGNOSIS — G89.29 OTHER CHRONIC PAIN: ICD-10-CM

## 2025-03-24 DIAGNOSIS — Z79.891 LONG TERM (CURRENT) USE OF OPIATE ANALGESIC: ICD-10-CM

## 2025-03-24 RX ORDER — WARFARIN SODIUM 5 MG/1
TABLET ORAL
Qty: 130 TABLET | Refills: 1 | Status: SHIPPED | OUTPATIENT
Start: 2025-03-24

## 2025-03-24 RX ORDER — WARFARIN SODIUM 5 MG/1
TABLET ORAL
Qty: 150 TABLET | Refills: 1 | Status: CANCELLED | OUTPATIENT
Start: 2025-03-24

## 2025-03-24 NOTE — TELEPHONE ENCOUNTER
Warfarin refill request need to go to the [facility].    Open encounter already exists for Norco and Folic Acid.

## 2025-03-24 NOTE — TELEPHONE ENCOUNTER
Patient requesting refill on folic acid, warfarin, hydrocodone. Send to [facility] pharmacy in [place].     Tasked to [facility] central refill for folic acid and hydrocodone refills.    Tasked to [facility], Coumadin clinic for warfarin refill request..    Please reply to pool: EM RN TRIAGE

## 2025-03-24 NOTE — TELEPHONE ENCOUNTER
Patient is requesting medication refills and mentions she does not have any remaining    Warfarin    Folic acid    Hydrocodone    [facility] preferred

## 2025-03-24 NOTE — TELEPHONE ENCOUNTER
Refill passed per [facility] protocol.    Requested Prescriptions   Pending Prescriptions Disp Refills    warfarin 5 MG Oral Tab 150 tablet 1     Sig: Take as directed by the INR clinic or take 2 tabs Tuesdays & Thursdays, take 1 & 1/2 tab all other days.       Rx [facility]em[facility] Warfarin Protocol Passed - 3/24/2025  2:54 PM        Passed - Appointment in past 12 or next 3 months     Recent Outpatient Visits              2 months ago [facility]    Office Visit    9 months ago Right knee pain, unspecified chronicity    [facility]    Office Visit    11 months ago Other chronic pain    [facility]    Office Visit    1 year ago Chronic pain of left knee    [facility] Osmani Perez MD    Office Visit    1 year ago Sickle cell disease, type sc, without crisis (HCC)    [facility] Hematology Oncology Matthew Chase MD    Office Visit          Future Appointments         Provider Department Appt Notes    In 2 weeks Kim Alejandre RN [facility]                     Passed - INR 3.9 or less past 6 weeks     INR   Date Value Ref Range Status   03/11/2025 2.5 (A) 0.8 - 1.2 Final                   Passed - Medication is active on med list        Passed - CMP within past 12 months     No results found for this or any previous visit (from the past 4380 hours).                Passed - AST < 111 (less than 3 Xs the upper limit)     Lab Results   Component Value Date    AST 15 04/14/2024                     Passed - ALT < 168 (less than 3Xs the upper limit)     Lab Results   Component Value Date    ALT 12 04/14/2024                     Passed - CBC within the past 12 months     Recent Results  (from the past 8760 hours)   CBC W/ DIFFERENTIAL    Collection Time: 04/14/24  6:38 PM   Result Value Ref Range    WBC 13.2 (H) 4.0 - 11.0 x10(3) uL    RBC 3.84 3.80 - 5.30 x10(6)uL    HGB 12.3 12.0 - 16.0 g/dL    HCT 32.9 (L) 35.0 - 48.0 %    MCV 85.7 80.0 - 100.0 fL    MCH 32.0 26.0 - 34.0 pg    MCHC 37.4 (H) 31.0 - 37.0 g/dL    RDW-SD 50.4 (H) 35.1 - 46.3 fL    RDW 16.6 (H) 11.0 - 15.0 %    .0 150.0 - 450.0 10(3)uL    Immature Platelet Fraction 6.1 0.0 - 7.0 %    Neutrophil Absolute Prelim 9.06 (H) 1.50 - 7.70 x10 (3) uL    Neutrophil Absolute 9.06 (H) 1.50 - 7.70 x10(3) uL    Lymphocyte Absolute 2.32 1.00 - 4.00 x10(3) uL    Monocyte Absolute 1.59 (H) 0.10 - 1.00 x10(3) uL    Eosinophil Absolute 0.05 0.00 - 0.70 x10(3) uL    Basophil Absolute 0.04 0.00 - 0.20 x10(3) uL    Immature Granulocyte Absolute 0.09 0.00 - 1.00 x10(3) uL    Neutrophil % 68.9 %    Lymphocyte % 17.6 %    Monocyte % 12.1 %    Eosinophil % 0.4 %    Basophil % 0.3 %    Immature Granulocyte % 0.7 %     *Note: Due to a large number of results and/or encounters for the requested time period, some results have not been displayed. A complete set of results can be found in Results Review.                 Passed - Hgb >/= 10g/dl within past 12 months     HGB   Date Value Ref Range Status   04/14/2024 12.3 12.0 - 16.0 g/dL Final                   Passed - Platelets >/= 151 past 12 months     PLT   Date Value Ref Range Status   04/14/2024 319.0 150.0 - 450.0 10(3)uL Final                        Future Appointments         Provider Department Appt Notes    In 2 weeks [facility]           Recent Outpatient Visits              2 months ago [facility] for [facility] cancer    [facility]    Office Visit    9 months ago Right knee pain, unspecified chronicity    [facility],  Radha Santana MD    Office Visit    11 months ago Other chronic pain    [facility], Radha Santana MD    Office Visit    1 year ago Chronic pain of left knee    [facility], Osmani Jeronimo MD    Office Visit    1 year ago Sickle cell disease, type sc, without crisis (HCC)    [facility] Hematology Oncology Matthew Chase MD    Office Visit

## 2025-03-24 NOTE — TELEPHONE ENCOUNTER
Please advise that Warfarin will need to be routed to [facility].    Patient called the call center. Creating separate telephone encounter for warfarin.     Folic acid has refills available at her pharmacy. A 1 year supply was sent 6/5/24.    Medication Quantity Refills Start End   folic acid 1 MG Oral Tab 90 tablet 3 6/5/2024 --   Sig:   Take 1 tablet (1 mg total) by mouth daily.     Route:   Oral     E-Prescribing Status: Receipt confirmed by pharmacy (6/5/2024  8:14 AM CDT)        [id number] - [address] AT [facility], [phone], [phone]

## 2025-03-25 RX ORDER — WARFARIN SODIUM 5 MG/1
TABLET ORAL
Qty: 130 TABLET | Refills: 1 | Status: CANCELLED | OUTPATIENT
Start: 2025-03-25

## 2025-03-28 ENCOUNTER — PATIENT MESSAGE (OUTPATIENT)
Age: 65
End: 2025-03-28

## 2025-03-28 RX ORDER — FOLIC ACID 1 MG/1
1 TABLET ORAL DAILY
Qty: 90 TABLET | Refills: 3 | OUTPATIENT
Start: 2025-03-28

## 2025-03-28 RX ORDER — HYDROCODONE BITARTRATE AND ACETAMINOPHEN 10; 325 MG/1; MG/1
1 TABLET ORAL 2 TIMES DAILY PRN
Qty: 60 TABLET | Refills: 0 | Status: SHIPPED | OUTPATIENT
Start: 2025-03-28

## 2025-03-28 NOTE — TELEPHONE ENCOUNTER
Dr. Ramos is out of office.    Please kindly review this medication    [x] FAILS PROTOCOL            [x] Controlled Substance             [] Medication not previously prescribed by Provider            [] Due for appointment- no future appointment scheduled            [] BP reading            [] Labs            [] Depression Screening            [] Asthma (ACT recorded/ACT score)    [] HAS NO PROTOCOL ATTACHED     Recent fill dates: 2/17/25, 1/8/25, and 12/6/24  Date of  last written prescription: 2/17/25   Last written quantity: #60 each and processed as a 30 day supply  LAST OFFICE VISIT: 1/14/25 (annual physical exam)  [x] Takes as needed  [] Takes scheduled daily

## 2025-03-31 NOTE — TELEPHONE ENCOUNTER
Patient called our office as she has not received her folic acid medication. Patient was inform to call her pharmacy as we did sent them a script on 6/5/2024 for 1 year. She will give then a call and call us back if needed.        folic acid 1 MG Oral Tab 90 tablet 3 6/5/2024 --    Sig - Route: Take 1 tablet (1 mg total) by mouth daily. - Oral    Sent to pharmacy as: Folic Acid 1 MG Oral Tablet (Folvite)    E-Prescribing Status: Receipt confirmed by pharmacy (6/5/2024  8:14 AM CDT)

## 2025-04-08 ENCOUNTER — TELEPHONE (OUTPATIENT)
Dept: ANTICOAGULATION | Facility: CLINIC | Age: 65
End: 2025-04-08

## 2025-04-08 ENCOUNTER — ANTI-COAG VISIT (OUTPATIENT)
Dept: ANTICOAGULATION | Facility: CLINIC | Age: 65
End: 2025-04-08

## 2025-04-08 DIAGNOSIS — D57.00 HB-SS DISEASE WITH CRISIS (HCC): ICD-10-CM

## 2025-04-08 DIAGNOSIS — Z79.01 MONITORING FOR LONG-TERM ANTICOAGULANT USE: ICD-10-CM

## 2025-04-08 DIAGNOSIS — D57.20 SICKLE CELL DISEASE, TYPE SC, WITHOUT CRISIS (HCC): Primary | ICD-10-CM

## 2025-04-08 DIAGNOSIS — Z51.81 MONITORING FOR LONG-TERM ANTICOAGULANT USE: ICD-10-CM

## 2025-04-08 LAB — INR: 1.4 (ref 0.8–1.2)

## 2025-04-08 PROCEDURE — 85610 PROTHROMBIN TIME: CPT | Performed by: FAMILY MEDICINE

## 2025-04-08 PROCEDURE — 93793 ANTICOAG MGMT PT WARFARIN: CPT | Performed by: FAMILY MEDICINE

## 2025-04-08 NOTE — TELEPHONE ENCOUNTER
Face to face.     Reports that the pharmacy only gave her a partial refill and then she ran out of pills and missed taking her dose the past two days. Chart reviewed and a refill for 130 tablets was approved by the pharmacy on 3/24- she is going there today to see if she can get the rest of the refill and she will call us if she has any trouble.     Per protocol, take an extra dose of warfarin and increase weekly dose. Recheck INR in 3-5 days.     Outside of protocol, as she missed two doses after running out of medication, take two extra partial doses of warfarin, then resume usual dose and recheck INR in 2 weeks.

## 2025-04-08 NOTE — PROGRESS NOTES
TTR 54.3%    Face to face.     Reports that the pharmacy only gave her a partial refill and then she ran out of pills and missed taking her dose the past two days. Chart reviewed and a refill for 130 tablets was approved by the pharmacy on 3/24- she is going there today to see if she can get the rest of the refill and she will call us if she has any trouble.     Per protocol, take an extra dose of warfarin and increase weekly dose. Recheck INR in 3-5 days.     Outside of protocol, as she missed two doses after running out of medication, take two extra partial doses of warfarin, then resume usual dose and recheck INR in 2 weeks.     4/8: 10 mg; 4/9: 10 mg; Otherwise 5 mg every Sun; 7.5 mg all other days    Routing for review/additional instructions- dose instructions provided to the patient are outside of [facility] protocol.

## 2025-04-22 ENCOUNTER — ANTI-COAG VISIT (OUTPATIENT)
Dept: ANTICOAGULATION | Facility: CLINIC | Age: 65
End: 2025-04-22

## 2025-04-22 DIAGNOSIS — D57.00 HB-SS DISEASE WITH CRISIS (HCC): ICD-10-CM

## 2025-04-22 DIAGNOSIS — Z79.01 MONITORING FOR LONG-TERM ANTICOAGULANT USE: ICD-10-CM

## 2025-04-22 DIAGNOSIS — Z51.81 MONITORING FOR LONG-TERM ANTICOAGULANT USE: ICD-10-CM

## 2025-04-22 DIAGNOSIS — D57.20 SICKLE CELL DISEASE, TYPE SC, WITHOUT CRISIS (HCC): Primary | ICD-10-CM

## 2025-04-22 LAB
INR: 2.3 (ref 0.8–1.2)
TEST STRIP LOT #: ABNORMAL NUMERIC

## 2025-04-22 PROCEDURE — 85610 PROTHROMBIN TIME: CPT | Performed by: FAMILY MEDICINE

## 2025-04-22 PROCEDURE — 93793 ANTICOAG MGMT PT WARFARIN: CPT | Performed by: FAMILY MEDICINE

## 2025-04-22 NOTE — PROGRESS NOTES
TTR 53.9%     Face to face.      Reports that she received her full refill of warfarin from the pharmacy- she is back to taking her usual dose. Denies any missed doses. INR is minimally below therapeutic goal range.     Per protocol, increase weekly dose 5-10%- actual increase 5% and recheck INR in 2 weeks.     7.5 mg every day

## 2025-04-28 ENCOUNTER — TELEPHONE (OUTPATIENT)
Dept: FAMILY MEDICINE CLINIC | Facility: CLINIC | Age: 65
End: 2025-04-28

## 2025-04-28 DIAGNOSIS — G89.29 OTHER CHRONIC PAIN: ICD-10-CM

## 2025-04-28 DIAGNOSIS — Z79.891 LONG TERM (CURRENT) USE OF OPIATE ANALGESIC: ICD-10-CM

## 2025-04-28 RX ORDER — HYDROCODONE BITARTRATE AND ACETAMINOPHEN 10; 325 MG/1; MG/1
1 TABLET ORAL 2 TIMES DAILY PRN
Qty: 60 TABLET | Refills: 0 | Status: SHIPPED | OUTPATIENT
Start: 2025-04-28

## 2025-04-28 NOTE — TELEPHONE ENCOUNTER
Calvin SIMS ( pod mate for Dr Ramos),    Pended prescription. Please assist  Thank you    Patient calling (verified full name and date of birth).  Patient asking for a refill of her hydrocodone  Patient has 3 day supply left

## 2025-05-06 ENCOUNTER — ANTI-COAG VISIT (OUTPATIENT)
Dept: ANTICOAGULATION | Facility: CLINIC | Age: 65
End: 2025-05-06

## 2025-05-06 DIAGNOSIS — D57.00 HB-SS DISEASE WITH CRISIS (HCC): ICD-10-CM

## 2025-05-06 DIAGNOSIS — Z51.81 MONITORING FOR LONG-TERM ANTICOAGULANT USE: ICD-10-CM

## 2025-05-06 DIAGNOSIS — Z79.01 MONITORING FOR LONG-TERM ANTICOAGULANT USE: ICD-10-CM

## 2025-05-06 DIAGNOSIS — D57.20 SICKLE CELL DISEASE, TYPE SC, WITHOUT CRISIS (HCC): Primary | ICD-10-CM

## 2025-05-06 LAB — INR: 3.8 (ref 0.8–1.2)

## 2025-05-06 PROCEDURE — 93793 ANTICOAG MGMT PT WARFARIN: CPT | Performed by: FAMILY MEDICINE

## 2025-05-06 PROCEDURE — 85610 PROTHROMBIN TIME: CPT | Performed by: FAMILY MEDICINE

## 2025-05-06 NOTE — PROGRESS NOTES
TTR 54%     Face to face.     Denies any change in diet/meds. Weekly warfarin dose was increased at last visit due to a subtherapeutic reading- though she had also missed some doses as was getting back to  taking her usual weekly dose. INR today is elevated.     Per protocol, return to previous weekly dose- decrease weekly dose 5-10%- actual decrease 4.8% and recheck INR in 2 weeks.     5 mg every Tue; 7.5 mg all other days

## 2025-05-09 RX ORDER — PANTOPRAZOLE SODIUM 40 MG/1
40 TABLET, DELAYED RELEASE ORAL
Qty: 90 TABLET | Refills: 3 | Status: SHIPPED | OUTPATIENT
Start: 2025-05-09

## 2025-05-20 ENCOUNTER — ANTI-COAG VISIT (OUTPATIENT)
Dept: ANTICOAGULATION | Facility: CLINIC | Age: 65
End: 2025-05-20

## 2025-05-20 DIAGNOSIS — D57.20 SICKLE CELL DISEASE, TYPE SC, WITHOUT CRISIS (HCC): Primary | ICD-10-CM

## 2025-05-20 DIAGNOSIS — Z79.01 MONITORING FOR LONG-TERM ANTICOAGULANT USE: ICD-10-CM

## 2025-05-20 DIAGNOSIS — D57.00 HB-SS DISEASE WITH CRISIS (HCC): ICD-10-CM

## 2025-05-20 DIAGNOSIS — Z51.81 MONITORING FOR LONG-TERM ANTICOAGULANT USE: ICD-10-CM

## 2025-05-20 LAB
INR: 3.1 (ref 0.8–1.2)
TEST STRIP EXPIRATION DATE: ABNORMAL DATE

## 2025-05-20 PROCEDURE — 85610 PROTHROMBIN TIME: CPT | Performed by: FAMILY MEDICINE

## 2025-05-20 PROCEDURE — 93793 ANTICOAG MGMT PT WARFARIN: CPT | Performed by: FAMILY MEDICINE

## 2025-05-20 NOTE — PROGRESS NOTES
TTR 54%     Face to face.      Per protocol, continue current dose and recheck INR in 2 weeks.     5 mg every Tue; 7.5 mg all other days

## 2025-06-01 ENCOUNTER — HOSPITAL ENCOUNTER (EMERGENCY)
Facility: HOSPITAL | Age: 65
Discharge: HOME | End: 2025-06-01
Attending: EMERGENCY MEDICINE
Payer: COMMERCIAL

## 2025-06-01 ENCOUNTER — APPOINTMENT (OUTPATIENT)
Dept: CT IMAGING | Facility: HOSPITAL | Age: 65
End: 2025-06-01
Attending: EMERGENCY MEDICINE
Payer: COMMERCIAL

## 2025-06-01 VITALS
SYSTOLIC BLOOD PRESSURE: 125 MMHG | TEMPERATURE: 99 F | OXYGEN SATURATION: 93 % | DIASTOLIC BLOOD PRESSURE: 72 MMHG | HEIGHT: 62 IN | WEIGHT: 155 LBS | BODY MASS INDEX: 28.52 KG/M2 | HEART RATE: 62 BPM | RESPIRATION RATE: 16 BRPM

## 2025-06-01 DIAGNOSIS — N30.00 ACUTE CYSTITIS WITHOUT HEMATURIA: ICD-10-CM

## 2025-06-01 DIAGNOSIS — M54.50 LOW BACK PAIN AT MULTIPLE SITES: Primary | ICD-10-CM

## 2025-06-01 DIAGNOSIS — D57.00 SICKLE CELL CRISIS (HCC): ICD-10-CM

## 2025-06-01 LAB
ALBUMIN SERPL-MCNC: 4.6 G/DL (ref 3.2–4.8)
ALP LIVER SERPL-CCNC: 76 U/L (ref 50–130)
ALT SERPL-CCNC: 12 U/L (ref 10–49)
ANION GAP SERPL CALC-SCNC: 10 MMOL/L (ref 0–18)
APTT PPP: 37.5 SECONDS (ref 23–36)
AST SERPL-CCNC: 23 U/L (ref ?–34)
BASOPHILS # BLD AUTO: 0.07 X10(3) UL (ref 0–0.2)
BASOPHILS NFR BLD AUTO: 0.6 %
BILIRUB DIRECT SERPL-MCNC: 0.7 MG/DL (ref ?–0.3)
BILIRUB SERPL-MCNC: 2.3 MG/DL (ref 0.2–1.1)
BILIRUB UR QL: NEGATIVE
BUN BLD-MCNC: 12 MG/DL (ref 9–23)
BUN/CREAT SERPL: 11.9 (ref 10–20)
CALCIUM BLD-MCNC: 9.6 MG/DL (ref 8.7–10.4)
CHLORIDE SERPL-SCNC: 106 MMOL/L (ref 98–112)
CLARITY UR: CLEAR
CO2 SERPL-SCNC: 26 MMOL/L (ref 21–32)
CREAT BLD-MCNC: 1.01 MG/DL (ref 0.55–1.02)
DEPRECATED RDW RBC AUTO: 49.5 FL (ref 35.1–46.3)
EGFRCR SERPLBLD CKD-EPI 2021: 62 ML/MIN/1.73M2 (ref 60–?)
EOSINOPHIL # BLD AUTO: 0.12 X10(3) UL (ref 0–0.7)
EOSINOPHIL NFR BLD AUTO: 1 %
ERYTHROCYTE [DISTWIDTH] IN BLOOD BY AUTOMATED COUNT: 15.3 % (ref 11–15)
GLUCOSE BLD-MCNC: 93 MG/DL (ref 70–99)
GLUCOSE UR-MCNC: NORMAL MG/DL
HCT VFR BLD AUTO: 31.2 % (ref 35–48)
HGB BLD-MCNC: 11.2 G/DL (ref 12–16)
HGB RETIC QN AUTO: 33.4 PG (ref 28.2–36.6)
HGB UR QL STRIP.AUTO: NEGATIVE
IMM GRANULOCYTES # BLD AUTO: 0.05 X10(3) UL (ref 0–1)
IMM GRANULOCYTES NFR BLD: 0.4 %
IMM RETICS NFR: 0.25 RATIO (ref 0.1–0.3)
INR BLD: 2.78 (ref 0.8–1.2)
KETONES UR-MCNC: NEGATIVE MG/DL
LEUKOCYTE ESTERASE UR QL STRIP.AUTO: 250
LIPASE SERPL-CCNC: 37 U/L (ref 12–53)
LYMPHOCYTES # BLD AUTO: 1.87 X10(3) UL (ref 1–4)
LYMPHOCYTES NFR BLD AUTO: 15.5 %
MCH RBC QN AUTO: 31.5 PG (ref 26–34)
MCHC RBC AUTO-ENTMCNC: 35.9 G/DL (ref 31–37)
MCV RBC AUTO: 87.6 FL (ref 80–100)
MONOCYTES # BLD AUTO: 1.41 X10(3) UL (ref 0.1–1)
MONOCYTES NFR BLD AUTO: 11.7 %
NEUTROPHILS # BLD AUTO: 8.52 X10 (3) UL (ref 1.5–7.7)
NEUTROPHILS # BLD AUTO: 8.52 X10(3) UL (ref 1.5–7.7)
NEUTROPHILS NFR BLD AUTO: 70.8 %
NITRITE UR QL STRIP.AUTO: NEGATIVE
OSMOLALITY SERPL CALC.SUM OF ELEC: 293 MOSM/KG (ref 275–295)
PH UR: 7 [PH] (ref 5–8)
PLATELET # BLD AUTO: 349 10(3)UL (ref 150–450)
PLATELET MORPHOLOGY: NORMAL
PLATELETS.RETICULATED NFR BLD AUTO: 3.1 % (ref 0–7)
POTASSIUM SERPL-SCNC: 3.7 MMOL/L (ref 3.5–5.1)
PROT SERPL-MCNC: 7.7 G/DL (ref 5.7–8.2)
PROT UR-MCNC: NEGATIVE MG/DL
PROTHROMBIN TIME: 30.6 SECONDS (ref 11.6–14.8)
RBC # BLD AUTO: 3.56 X10(6)UL (ref 3.8–5.3)
RETICS # AUTO: 158.8 X10(3) UL (ref 22.5–147.5)
RETICS/RBC NFR AUTO: 4.5 % (ref 0.5–2.5)
SODIUM SERPL-SCNC: 142 MMOL/L (ref 136–145)
SP GR UR STRIP: 1.01 (ref 1–1.03)
UROBILINOGEN UR STRIP-ACNC: NORMAL
WBC # BLD AUTO: 12 X10(3) UL (ref 4–11)
WBC #/AREA URNS AUTO: >50 /HPF

## 2025-06-01 PROCEDURE — 87086 URINE CULTURE/COLONY COUNT: CPT | Performed by: EMERGENCY MEDICINE

## 2025-06-01 PROCEDURE — 85025 COMPLETE CBC W/AUTO DIFF WBC: CPT | Performed by: EMERGENCY MEDICINE

## 2025-06-01 PROCEDURE — 96374 THER/PROPH/DIAG INJ IV PUSH: CPT

## 2025-06-01 PROCEDURE — 85045 AUTOMATED RETICULOCYTE COUNT: CPT | Performed by: EMERGENCY MEDICINE

## 2025-06-01 PROCEDURE — 83690 ASSAY OF LIPASE: CPT | Performed by: EMERGENCY MEDICINE

## 2025-06-01 PROCEDURE — 96361 HYDRATE IV INFUSION ADD-ON: CPT

## 2025-06-01 PROCEDURE — 81001 URINALYSIS AUTO W/SCOPE: CPT | Performed by: EMERGENCY MEDICINE

## 2025-06-01 PROCEDURE — 85610 PROTHROMBIN TIME: CPT | Performed by: EMERGENCY MEDICINE

## 2025-06-01 PROCEDURE — 96375 TX/PRO/DX INJ NEW DRUG ADDON: CPT

## 2025-06-01 PROCEDURE — 87077 CULTURE AEROBIC IDENTIFY: CPT | Performed by: EMERGENCY MEDICINE

## 2025-06-01 PROCEDURE — 99285 EMERGENCY DEPT VISIT HI MDM: CPT

## 2025-06-01 PROCEDURE — 80076 HEPATIC FUNCTION PANEL: CPT | Performed by: EMERGENCY MEDICINE

## 2025-06-01 PROCEDURE — 80048 BASIC METABOLIC PNL TOTAL CA: CPT | Performed by: EMERGENCY MEDICINE

## 2025-06-01 PROCEDURE — 74177 CT ABD & PELVIS W/CONTRAST: CPT | Performed by: EMERGENCY MEDICINE

## 2025-06-01 PROCEDURE — 96376 TX/PRO/DX INJ SAME DRUG ADON: CPT

## 2025-06-01 PROCEDURE — 85730 THROMBOPLASTIN TIME PARTIAL: CPT | Performed by: EMERGENCY MEDICINE

## 2025-06-01 PROCEDURE — 87186 SC STD MICRODIL/AGAR DIL: CPT | Performed by: EMERGENCY MEDICINE

## 2025-06-01 RX ORDER — CEPHALEXIN 500 MG/1
500 CAPSULE ORAL 3 TIMES DAILY
Qty: 21 CAPSULE | Refills: 0 | Status: SHIPPED | OUTPATIENT
Start: 2025-06-01 | End: 2025-06-08

## 2025-06-01 RX ORDER — HYDROMORPHONE HYDROCHLORIDE 1 MG/ML
1 INJECTION, SOLUTION INTRAMUSCULAR; INTRAVENOUS; SUBCUTANEOUS ONCE
Refills: 0 | Status: COMPLETED | OUTPATIENT
Start: 2025-06-01 | End: 2025-06-01

## 2025-06-01 NOTE — ED PROVIDER NOTES
Patient Seen in: [facility] Emergency Department        History  Chief Complaint   Patient presents with    Sickle Cell    Back Pain     Stated Complaint: sickle cell crisis    Subjective:   HPI            Patient presents emergency department complaint of low back pain typical for acute sickle cell crisis.  She describes dull throbbing aching pain unrelieved by her home narcotic pain medications.  There is no vomiting, fever, chills, abdominal pain.  There is no other aggravating or alleviating factors.  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      Objective:     Past Medical History:    Asthma (HCC)    Sickle cell anemia (HCC)              Past Surgical History:   Procedure Laterality Date          Colonoscopy  2008    Colonoscopy screening - referral N/A 10/20/2022    Procedure: COLONOSCOPY-SCREENING   ESOPHAGOGASTRODUODENOSCOPY (EGD);  Surgeon: Migel Hardwick MD;  Location: [facility] ENDOSCOPY    Hysterectomy      in her 50's    Laparoscopy,diagnostic      Neck/chest procedure unlisted      Other surgical history      gallbladder    Repair rotator cuff,acute      right arm                Social History     Socioeconomic History    Marital status:    Tobacco Use    Smoking status: Never    Smokeless tobacco: Never   Vaping Use    Vaping status: Never Used   Substance and Sexual Activity    Alcohol use: Never    Drug use: Never                                Physical Exam    ED Triage Vitals [25 0816]   /76   Pulse 86   Resp 16   Temp 98.6 °F (37 °C)   Temp src Temporal   SpO2 93 %   O2 Device None (Room air)       Current Vitals:   Vital Signs  BP: 125/72  Pulse: 62  Resp: 16  Temp: 98.6 °F (37 °C)  Temp src: Temporal  MAP (mmHg): 86    Oxygen Therapy  SpO2: 93 %  O2 Device: None (Room air)            Physical Exam  Vitals and nursing note reviewed.   Constitutional:       General: She is not in acute distress.     Appearance: She is well-developed.   HENT:       Head: Normocephalic.      Nose: Nose normal.      Mouth/Throat:      Mouth: Mucous membranes are moist.   Eyes:      Conjunctiva/sclera: Conjunctivae normal.   Cardiovascular:      Rate and Rhythm: Normal rate and regular rhythm.      Heart sounds: No murmur heard.  Pulmonary:      Effort: Pulmonary effort is normal. No respiratory distress.      Breath sounds: Normal breath sounds.   Abdominal:      General: There is no distension.      Palpations: Abdomen is soft.      Tenderness: There is no abdominal tenderness.   Musculoskeletal:      Cervical back: Normal range of motion and neck supple.      Comments: Diffuse low back pain to palpation without deformity abrasion or laceration.   Skin:     General: Skin is warm and dry.      Capillary Refill: Capillary refill takes less than 2 seconds.      Findings: No rash.   Neurological:      Mental Status: She is alert and oriented to person, place, and time.      Cranial Nerves: No cranial nerve deficit.      Sensory: No sensory deficit.      Motor: No weakness.      Coordination: Coordination normal.                 ED Course  Labs Reviewed   CBC WITH DIFFERENTIAL WITH PLATELET - Abnormal; Notable for the following components:       Result Value    WBC 12.0 (*)     RBC 3.56 (*)     HGB 11.2 (*)     HCT 31.2 (*)     RDW-SD 49.5 (*)     RDW 15.3 (*)     Neutrophil Absolute Prelim 8.52 (*)     Neutrophil Absolute 8.52 (*)     Monocyte Absolute 1.41 (*)     All other components within normal limits   HEPATIC FUNCTION PANEL (7) - Abnormal; Notable for the following components:    Bilirubin, Total 2.3 (*)     Bilirubin, Direct 0.7 (*)     All other components within normal limits   URINALYSIS, ROUTINE - Abnormal; Notable for the following components:    Leukocyte Esterase Urine 250 (*)     WBC Urine >50 (*)     RBC Urine 6-10 (*)     Bacteria Urine Rare (*)     Squamous Epi. Cells Few (*)     All other components within normal limits   RETICULOCYTE COUNT - Abnormal;  Notable for the following components:    Retic% 4.5 (*)     Retic Absolute 158.8 (*)     All other components within normal limits   PROTHROMBIN TIME (PT) - Abnormal; Notable for the following components:    PT 30.6 (*)     INR 2.78 (*)     All other components within normal limits   PTT, ACTIVATED - Abnormal; Notable for the following components:    PTT 37.5 (*)     All other components within normal limits   RBC MORPHOLOGY SCAN - Abnormal; Notable for the following components:    RBC Morphology See morphology below (*)     All other components within normal limits   URINE CULTURE, ROUTINE - Abnormal; Notable for the following components:    Urine Culture 50,000-99,000 CFU/ML Escherichia coli (*)     All other components within normal limits   BASIC METABOLIC PANEL (8) - Normal   LIPASE - Normal   SCAN SLIDE                            MDM             Medical Decision Making  Differential diagnosis considered for kidney stone, muscle strain, sickle cell crisis, retroperitoneal hematoma.    Problems Addressed:  Acute cystitis without hematuria: acute illness or injury  Low back pain at multiple sites: acute illness or injury  Sickle cell crisis (HCC): acute illness or injury    Amount and/or Complexity of Data Reviewed  Labs: ordered. Decision-making details documented in ED Course.     Details: Labs reviewed.  Normal CBC consistent with sickle cell disease.  Reticulocyte count appropriately elevated.  Radiology: ordered and independent interpretation performed. Decision-making details documented in ED Course.     Details: CT scan of the abdomen pelvis shows findings consistent with cystitis  Discussion of management or test interpretation with external provider(s): Patient feels much better after pain medication and fluids.  Will treat for UTI and have patient follow-up with her primary care physician.    Risk  Prescription drug management.  Parenteral controlled substances.        Disposition and Plan     Clinical  Impression:  1. Low back pain at multiple sites    2. Sickle cell crisis (HCC)    3. Acute cystitis without hematuria         Disposition:  Discharge  6/1/2025  1:37 pm    Follow-up:  Radha Ramos MD  [address] [place] [id number] [phone]    Schedule an appointment as soon as possible for a visit            Medications Prescribed:  Discharge Medication List as of 6/1/2025  2:15 PM        START taking these medications    Details   cephALEXin 500 MG Oral Cap Take 1 capsule (500 mg total) by mouth 3 (three) times daily for 7 days., Normal, Disp-21 capsule, R-0                   Supplementary Documentation:

## 2025-06-01 NOTE — ED INITIAL ASSESSMENT (HPI)
Pt ambulatory to ED A&O x 4.  Pt reporting she is in, \"sickle cell crisis.\"  Pt woke up this AM w/ significant pain to lower back.

## 2025-06-03 ENCOUNTER — ANTI-COAG VISIT (OUTPATIENT)
Dept: ANTICOAGULATION | Facility: CLINIC | Age: 65
End: 2025-06-03

## 2025-06-03 DIAGNOSIS — Z79.01 MONITORING FOR LONG-TERM ANTICOAGULANT USE: ICD-10-CM

## 2025-06-03 DIAGNOSIS — D57.00 HB-SS DISEASE WITH CRISIS (HCC): ICD-10-CM

## 2025-06-03 DIAGNOSIS — D57.20 SICKLE CELL DISEASE, TYPE SC, WITHOUT CRISIS (HCC): Primary | ICD-10-CM

## 2025-06-03 DIAGNOSIS — Z51.81 MONITORING FOR LONG-TERM ANTICOAGULANT USE: ICD-10-CM

## 2025-06-03 LAB
INR: 3 (ref 0.8–1.2)
TEST STRIP EXPIRATION DATE: ABNORMAL DATE

## 2025-06-03 PROCEDURE — 85610 PROTHROMBIN TIME: CPT | Performed by: FAMILY MEDICINE

## 2025-06-03 PROCEDURE — 93793 ANTICOAG MGMT PT WARFARIN: CPT | Performed by: FAMILY MEDICINE

## 2025-06-03 NOTE — PROGRESS NOTES
TTR 54.4%    Face to face.     Reports she has a UTI and started taking cephalexin on Sunday 6/1- three times/day for 7 days. Per Up to Date- there is a moderate interaction risk for increased bleeding. INR today is therapeutic and was 2.7 in the ER.     Per protocol, continue current dose and recheck INR in 3 weeks.     5 mg every Tue; 7.5 mg all other days

## 2025-06-04 ENCOUNTER — TELEPHONE (OUTPATIENT)
Dept: ANTICOAGULATION | Facility: CLINIC | Age: 65
End: 2025-06-04

## 2025-06-04 DIAGNOSIS — Z51.81 MONITORING FOR LONG-TERM ANTICOAGULANT USE: ICD-10-CM

## 2025-06-04 DIAGNOSIS — D57.20 SICKLE CELL DISEASE, TYPE SC, WITHOUT CRISIS (HCC): Primary | ICD-10-CM

## 2025-06-04 DIAGNOSIS — D57.00 HB-SS DISEASE WITH CRISIS (HCC): ICD-10-CM

## 2025-06-04 DIAGNOSIS — Z79.01 MONITORING FOR LONG-TERM ANTICOAGULANT USE: ICD-10-CM

## 2025-06-06 DIAGNOSIS — G89.29 OTHER CHRONIC PAIN: ICD-10-CM

## 2025-06-06 DIAGNOSIS — Z79.891 LONG TERM (CURRENT) USE OF OPIATE ANALGESIC: ICD-10-CM

## 2025-06-06 NOTE — TELEPHONE ENCOUNTER
Patient, is requesting a refill on her hydrocodone medication. Per the patient she is out of medication. Patient did schedule an appointment to see Dr. Ramos on 6-24-25. Pharmacy:[facility]/[place], [place] (listed)     Current Outpatient Medications   Medication Sig Dispense Refill    HYDROcodone-acetaminophen  MG Oral Tab Take 1 tablet by mouth 2 (two) times daily as needed for Pain. 60 tablet 0

## 2025-06-06 NOTE — TELEPHONE ENCOUNTER
Please review; protocol failed/ has no protocol    Maurice Galvana32 minutes ago (3:05 PM)     BP  Patient, is requesting a refill on her hydrocodone medication. Per the patient she is out of medication. Patient did schedule an appointment to see Dr. Ramos on 6-24-25. Pharmacy:[facility]/[place], [place] (listed)                  Recent fills: 04/28/2025,03/28/2025,02/17/2025  Last Rx written: 04/28/2025  Last Office Visit: 01/14/2025    Recent Visits  Date Type Provider Dept   01/14/25 Office Visit Radha Ramos MD [facility]     Future Appointments  Date Type Provider Dept   06/24/25 Appointment Radha Ramos MD [facility]   Showing future appointments within next 150 days with a meds authorizing provider and meeting all other requirements

## 2025-06-09 RX ORDER — HYDROCODONE BITARTRATE AND ACETAMINOPHEN 10; 325 MG/1; MG/1
1 TABLET ORAL 2 TIMES DAILY PRN
Qty: 60 TABLET | Refills: 0 | Status: SHIPPED | OUTPATIENT
Start: 2025-06-09

## 2025-06-09 NOTE — TELEPHONE ENCOUNTER
To pharmacy: Needs to make an apt for further refills     Future Appointments   Date Time Provider Department Center   6/24/2025  2:30 PM Radha Ramos MD [facility]   6/27/2025 10:15 AM [facility]

## 2025-06-09 NOTE — TELEPHONE ENCOUNTER
Patient called to follow-up on this refill request. Per patient she is completely out of medication. Per patient send refill to the following pharmacy:    [facility]     [id number]

## 2025-06-09 NOTE — TELEPHONE ENCOUNTER
Message noted: Chart reviewed and may refill medication as requested. Prescription sent to listed pharmacy. Please notify patient. Further refills as per Dr. PORTILLO

## 2025-06-16 RX ORDER — OXYBUTYNIN CHLORIDE 5 MG/1
5 TABLET ORAL DAILY
Qty: 90 TABLET | Refills: 3 | Status: SHIPPED | OUTPATIENT
Start: 2025-06-16

## 2025-06-16 NOTE — TELEPHONE ENCOUNTER
Refill Per Protocol     Requested Prescriptions   Pending Prescriptions Disp Refills    OXYBUTYNIN 5 MG Oral Tab [Pharmacy Med Name: OXYBUTYNIN 5 MG TABLET] 90 tablet 3     Sig: Take 1 tablet (5 mg total) by mouth daily. Along with 15 mg daily for total of 20mg daily.       Genitourinary Medications Passed - 6/16/2025  3:47 PM        Passed - Patient does not have pulmonary hypertension on problem list        Passed - In person appointment or virtual visit in the past 12 mos or appointment in next 3 mos     Recent Outpatient Visits              5 months [facility]    [facility]    11 months ago [facility], [facility]    [facility]    1 year ago Other chronic pain    [facility]    Office Visit    1 year ago Chronic pain of left knee    [facility] Perez [facility]    Office Visit    1 year ago Sickle cell disease, type sc, without crisis (HCC)    [facility] Matthew Chase MD    Office Visit          Future Appointments         Provider Department Appt Notes    In 1 week Radha Ramos MD [facility] follow up on meds policy informed to pt.    In 1 week Kim Alejandre RN [facility]                     Passed - Medication is active on med list

## 2025-06-24 ENCOUNTER — OFFICE VISIT (OUTPATIENT)
Dept: FAMILY MEDICINE CLINIC | Facility: CLINIC | Age: 65
End: 2025-06-24

## 2025-06-24 VITALS
RESPIRATION RATE: 20 BRPM | WEIGHT: 185 LBS | HEART RATE: 63 BPM | TEMPERATURE: 96 F | HEIGHT: 62 IN | SYSTOLIC BLOOD PRESSURE: 145 MMHG | DIASTOLIC BLOOD PRESSURE: 80 MMHG | BODY MASS INDEX: 34.04 KG/M2 | OXYGEN SATURATION: 94 %

## 2025-06-24 DIAGNOSIS — Z12.31 SCREENING MAMMOGRAM FOR BREAST CANCER: Primary | ICD-10-CM

## 2025-06-24 PROCEDURE — 3008F BODY MASS INDEX DOCD: CPT | Performed by: FAMILY MEDICINE

## 2025-06-24 PROCEDURE — 3077F SYST BP >= 140 MM HG: CPT | Performed by: FAMILY MEDICINE

## 2025-06-24 PROCEDURE — 90471 IMMUNIZATION ADMIN: CPT | Performed by: FAMILY MEDICINE

## 2025-06-24 PROCEDURE — 3079F DIAST BP 80-89 MM HG: CPT | Performed by: FAMILY MEDICINE

## 2025-06-24 PROCEDURE — 99213 OFFICE O/P EST LOW 20 MIN: CPT | Performed by: FAMILY MEDICINE

## 2025-06-24 PROCEDURE — 90620 MENB-4C VACCINE IM: CPT | Performed by: FAMILY MEDICINE

## 2025-06-24 RX ORDER — OXYBUTYNIN CHLORIDE 15 MG/1
15 TABLET, EXTENDED RELEASE ORAL DAILY
COMMUNITY
Start: 2025-05-04

## 2025-06-26 ENCOUNTER — HOSPITAL ENCOUNTER (OUTPATIENT)
Dept: MAMMOGRAPHY | Age: 65
Discharge: HOME | End: 2025-06-26
Attending: FAMILY MEDICINE
Payer: COMMERCIAL

## 2025-06-26 DIAGNOSIS — Z12.31 SCREENING MAMMOGRAM FOR BREAST CANCER: ICD-10-CM

## 2025-06-26 PROCEDURE — 77067 SCR MAMMO BI INCL CAD: CPT | Performed by: FAMILY MEDICINE

## 2025-06-26 PROCEDURE — 77063 BREAST TOMOSYNTHESIS BI: CPT | Performed by: FAMILY MEDICINE

## 2025-06-27 ENCOUNTER — ANTI-COAG VISIT (OUTPATIENT)
Dept: ANTICOAGULATION | Facility: CLINIC | Age: 65
End: 2025-06-27
Payer: COMMERCIAL

## 2025-06-27 ENCOUNTER — TELEPHONE (OUTPATIENT)
Dept: ANTICOAGULATION | Facility: CLINIC | Age: 65
End: 2025-06-27

## 2025-06-27 DIAGNOSIS — Z51.81 MONITORING FOR LONG-TERM ANTICOAGULANT USE: ICD-10-CM

## 2025-06-27 DIAGNOSIS — D57.00 HB-SS DISEASE WITH CRISIS (HCC): ICD-10-CM

## 2025-06-27 DIAGNOSIS — D57.20 SICKLE CELL DISEASE, TYPE SC, WITHOUT CRISIS (HCC): Primary | ICD-10-CM

## 2025-06-27 DIAGNOSIS — Z79.01 MONITORING FOR LONG-TERM ANTICOAGULANT USE: ICD-10-CM

## 2025-06-27 LAB
INR: 3.6 (ref 0.8–1.2)
TEST STRIP EXPIRATION DATE: ABNORMAL DATE

## 2025-06-27 PROCEDURE — 85610 PROTHROMBIN TIME: CPT | Performed by: FAMILY MEDICINE

## 2025-06-27 PROCEDURE — 93793 ANTICOAG MGMT PT WARFARIN: CPT | Performed by: FAMILY MEDICINE

## 2025-06-27 NOTE — PROGRESS NOTES
Patient with complaints of nausea, vomiting diarrhea and abdominal cramping since yesterday. Subjective:   Patient ID: Daphne Reynoso is a 65 year old female.    HPI  Patient for f/u hypertension   Denies any chest pain shortness of breath or headaches.   Monitoring blood pressure at home and is below 135/85. Needs refill of medications.     History/Other:   Constitutional: Negative.  Negative for activity change, appetite change, diaphoresis and fatigue.     Respiratory: Negative.  Negative for apnea, cough, chest tightness and shortness of breath.    Cardiovascular: Negative.  Negative for chest pain, palpitations and leg swelling.   Gastrointestinal: Negative.  Negative for abdominal pain.   Skin: Negative.               Review of Systems  Current Medications[1]  Allergies:Allergies[2]    Objective:   Physical Exam  Constitutional:       Appearance: Normal appearance.   Cardiovascular:      Rate and Rhythm: Normal rate and regular rhythm.      Pulses: Normal pulses.      Heart sounds: Normal heart sounds.   Pulmonary:      Effort: Pulmonary effort is normal.      Breath sounds: Normal breath sounds.   Neurological:      Mental Status: She is alert.         Assessment & Plan:   1. Screening mammogram for breast cancer    2. Hypertension cpm   F/u in 3 months     Orders Placed This Encounter   Procedures    BEXSERO, MENINGOCOCCAL B VACCINE       Meds This Visit:  Requested Prescriptions      No prescriptions requested or ordered in this encounter       Imaging & Referrals:  SEROGROUP B MENINGOCOCCAL (MENB) 2 DOSE SCHEDULE  [facility] SALVATORE 2D+3D SCREENING BILAT (CPT=77067/25059)         [1]   Current Outpatient Medications   Medication Sig Dispense Refill    oxyBUTYnin Chloride ER 15 MG Oral Tablet 24 Hr Take 1 tablet (15 mg total) by mouth daily.      oxybutynin 5 MG Oral Tab Take 1 tablet (5 mg total) by mouth daily. Along with 15 mg daily for total of 20mg daily. 90 tablet 3    HYDROcodone-acetaminophen  MG Oral Tab Take 1 tablet by mouth 2 (two) times daily as needed for Pain. 60 tablet 0    pantoprazole  40 MG Oral Tab EC Take 1 tablet (40 mg total) by mouth before breakfast. 90 tablet 3    warfarin 5 MG Oral Tab Take as directed by the coumadin clinic. Take one tablet (5 mg) one night a week. Take one and a half tablets (7.5 mg) six nights a week. 130 tablet 1    amLODIPine Besy-Benazepril HCl 10-20 MG Oral Cap Take 1 capsule by mouth daily. 90 capsule 3    fluticasone propionate 50 MCG/ACT Nasal Suspension 2 sprays by Nasal route daily. 48 mL 3    folic acid 1 MG Oral Tab Take 1 tablet (1 mg total) by mouth daily. 90 tablet 3    lidocaine 5 % External Patch PLACE 2 PATCHES ONTO THE SKIN AT NIGHT PLACE FOR 12 HOURS ON AND 12 HOURS OFF 60 patch 2    albuterol 108 (90 Base) MCG/ACT Inhalation Aero Soln Inhale 2 puffs into the lungs every 4 (four) hours as needed. 42.5 each 1    Calcium Citrate-Vitamin D 315-250 MG-UNIT Oral Tab Take 1 tablet by mouth daily.      Vitamin D3, Cholecalciferol, 10 MCG (400 UNIT) Oral Tab Take 2.5 tablets (1,000 Units total) by mouth daily.     [2]   Allergies  Allergen Reactions    Codeine PALPITATIONS    Morphine PALPITATIONS    Opioid Analgesics SHORTNESS OF BREATH     heart races with the use of codeine  Itching and tachycardia      Sulfa Antibiotics SHORTNESS OF BREATH     Rash and tongue swells, itching  Other reaction(s): Swelling - oral/pharyngeal    Dust Mite Extract OTHER (SEE COMMENTS)     Stuffy nose, HA

## 2025-06-27 NOTE — PROGRESS NOTES
TTR 54.8%     Face to face.     INR is minimally above therapeutic goal range. Denies any change in diet/meds.      Per protocol, decrease weekly dose.     Outside of protocol, as INR is minimally above goal range and no recent changes, continue current dose and recheck INR in 3 weeks.     5 mg every Tue; 7.5 mg all other days    Routing for review/additional instructions- dose instructions provided to the patient are outside of [facility] protocol.

## 2025-06-27 NOTE — TELEPHONE ENCOUNTER
Today's INR= 3.6  Goal= 2.5-3.5    Routing for review/additional instructions- dose instructions provided to the patient are outside of [facility] protocol.     TTR 54.8%     Face to face.     INR is minimally above therapeutic goal range. Denies any change in diet/meds.      Per protocol, decrease weekly dose.     Outside of protocol, as INR is minimally above goal range and no recent changes, continue current dose and recheck INR in 3 weeks.     5 mg every Tue; 7.5 mg all other days

## 2025-07-03 RX ORDER — FLUTICASONE PROPIONATE 50 MCG
2 SPRAY, SUSPENSION (ML) NASAL DAILY
Qty: 48 G | Refills: 3 | Status: SHIPPED | OUTPATIENT
Start: 2025-07-03

## 2025-07-03 NOTE — TELEPHONE ENCOUNTER
Refill passes per [facility] protocol.    No future appointments with primary care medicine

## 2025-07-15 RX ORDER — FOLIC ACID 1 MG/1
1 TABLET ORAL DAILY
Qty: 90 TABLET | Refills: 0 | Status: SHIPPED | OUTPATIENT
Start: 2025-07-15

## 2025-07-17 DIAGNOSIS — G89.29 OTHER CHRONIC PAIN: ICD-10-CM

## 2025-07-17 DIAGNOSIS — Z79.891 LONG TERM (CURRENT) USE OF OPIATE ANALGESIC: ICD-10-CM

## 2025-07-17 NOTE — TELEPHONE ENCOUNTER
Patient is requesting a refill on her hydrocodone medication. Patient is out of medication. Pharmacy: [facility]/[place], [place] (Listed)       Current Outpatient Medications   Medication Sig Dispense Refill    HYDROcodone-acetaminophen  MG Oral Tab Take 1 tablet by mouth 2 (two) times daily as needed for Pain. 60 tablet 0

## 2025-07-17 NOTE — TELEPHONE ENCOUNTER
Please review. Refill failed protocol.     Recent fills each # 60 : 6/9/25 , 4/28/25 , 3/28/25  Last prescription written: 6/9/25  Last office visit:  6/24/2025    Future Appointments   Date Time Provider Department Center   7/18/2025 10:15 AM [facility]

## 2025-07-18 ENCOUNTER — ANTI-COAG VISIT (OUTPATIENT)
Dept: ANTICOAGULATION | Facility: CLINIC | Age: 65
End: 2025-07-18

## 2025-07-18 ENCOUNTER — TELEPHONE (OUTPATIENT)
Dept: ANTICOAGULATION | Facility: CLINIC | Age: 65
End: 2025-07-18

## 2025-07-18 DIAGNOSIS — Z79.01 MONITORING FOR LONG-TERM ANTICOAGULANT USE: ICD-10-CM

## 2025-07-18 DIAGNOSIS — D57.20 SICKLE CELL DISEASE, TYPE SC, WITHOUT CRISIS (HCC): Primary | ICD-10-CM

## 2025-07-18 DIAGNOSIS — Z51.81 MONITORING FOR LONG-TERM ANTICOAGULANT USE: ICD-10-CM

## 2025-07-18 DIAGNOSIS — D57.00 HB-SS DISEASE WITH CRISIS (HCC): ICD-10-CM

## 2025-07-18 LAB
INR: 1.2 (ref 0.8–1.2)
TEST STRIP EXPIRATION DATE: NORMAL DATE

## 2025-07-18 RX ORDER — HYDROCODONE BITARTRATE AND ACETAMINOPHEN 10; 325 MG/1; MG/1
1 TABLET ORAL 2 TIMES DAILY PRN
Qty: 60 TABLET | Refills: 0 | Status: SHIPPED | OUTPATIENT
Start: 2025-07-18

## 2025-07-18 NOTE — TELEPHONE ENCOUNTER
Patient calling to follow up on refill request from yesterday. [facility] Please advise?

## 2025-07-18 NOTE — TELEPHONE ENCOUNTER
Today's INR= 1.2  Goal= 2.5-3.5    Routing for review/additional instructions- dose instructions provided to the patient are outside of [facility] protocol.     TTR 54.6%     Face to face. Here today with her niece.     INR is subtherapeutic- denies any missed doses, reports she uses a pill container specifically for warfarin. Denies any change in diet/meds recently. Reports she is not out of medication. INR was slightly elevated at visit three weeks ago- no dose change was made at that time.     Advised her to recheck pill bottle and medication container when she gets home to confirm that she is taking warfarin and the correct dose every day- niece will assist her. RN used [employer] pill identifier to show them the color and shape of the warfarin 5 mg tablet.     Per protocol, extra dose and increase weekly dose.     Outside of protocol, take an extra dose of warfarin tonight, no change to weekly dose as she has been therapeutic/slightly elevated with current weekly dose. Recheck INR in 2-3 days- she will return on Tuesday 7/22.     7/18: 15 mg; Otherwise 5 mg every Tue; 7.5 mg all other days

## 2025-07-18 NOTE — PROGRESS NOTES
TTR 54.6%     Face to face. Here today with her niece.     INR is subtherapeutic- denies any missed doses, reports she uses a pill container specifically for warfarin. Denies any change in diet/meds recently. Reports she is not out of medication. INR was slightly elevated at visit three weeks ago- no dose change was made at that time.     Advised her to recheck pill bottle and medication container when she gets home to confirm that she is taking warfarin and the correct dose every day- niece will assist her. RN used [employer] pill identifier to show them the color and shape of the warfarin 5 mg tablet.     Per protocol, extra dose and increase weekly dose.     Outside of protocol, take an extra dose of warfarin tonight, no change to weekly dose as she has been therapeutic/slightly elevated with current weekly dose. Recheck INR in 2-3 days- she will return on Tuesday 7/22.     7/18: 15 mg; Otherwise 5 mg every Tue; 7.5 mg all other days    Routing for review/additional instructions- dose instructions provided to the patient are outside of [facility] protocol.

## 2025-07-18 NOTE — TELEPHONE ENCOUNTER
To pharmacy: Needs to make an apt for further refills     Future Appointments   Date Time Provider Department Center   7/21/2025 10:15 AM Didi Israel RN [facility]   8/8/2025 10:00 AM [facility] message sent to patient to schedule an office visit with PCP.   Please make a phone attempt.

## 2025-07-21 ENCOUNTER — ANTI-COAG VISIT (OUTPATIENT)
Dept: ANTICOAGULATION | Facility: CLINIC | Age: 65
End: 2025-07-21
Payer: COMMERCIAL

## 2025-07-21 DIAGNOSIS — D57.20 SICKLE CELL DISEASE, TYPE SC, WITHOUT CRISIS (HCC): Primary | ICD-10-CM

## 2025-07-21 DIAGNOSIS — Z51.81 MONITORING FOR LONG-TERM ANTICOAGULANT USE: ICD-10-CM

## 2025-07-21 DIAGNOSIS — Z79.01 MONITORING FOR LONG-TERM ANTICOAGULANT USE: ICD-10-CM

## 2025-07-21 DIAGNOSIS — D57.00 HB-SS DISEASE WITH CRISIS (HCC): ICD-10-CM

## 2025-07-21 LAB
INR: 2.1 (ref 2.5–3.5)
TEST STRIP EXPIRATION DATE: ABNORMAL DATE

## 2025-07-21 NOTE — PROGRESS NOTES
Face-to-Face  / INR 2.1, sub therapeutic. (Goal 3.0 ) TTR 54.5 %     Etiology: somehow she forgot her pills last week. She reevaluated her medication culture and home. (Ie: her  is watching / helping)    PLAN: take 10mg today then resume the usual dose.    Recheck INR 2 weeks.    Pt reports:    No sign of unusual bruising or bleeding.  Any missed doses: No   Medications changes: No  Diet changes:  No    Contacted  [facility] verbalized understanding and agreement.    Change % 54.5    WARFARIN Plan per protocol: 7/21: 10 mg; Otherwise 5 mg every Tue; 7.5 mg all other days

## 2025-07-23 RX ORDER — FOLIC ACID 1 MG/1
1 TABLET ORAL DAILY
Qty: 90 TABLET | Refills: 0 | OUTPATIENT
Start: 2025-07-23

## 2025-08-01 RX ORDER — OXYBUTYNIN CHLORIDE 15 MG/1
TABLET, EXTENDED RELEASE ORAL
Qty: 90 TABLET | Refills: 3 | Status: SHIPPED | OUTPATIENT
Start: 2025-08-01

## 2025-08-04 ENCOUNTER — TELEPHONE (OUTPATIENT)
Dept: ANTICOAGULATION | Facility: CLINIC | Age: 65
End: 2025-08-04

## 2025-08-04 ENCOUNTER — ANTI-COAG VISIT (OUTPATIENT)
Dept: ANTICOAGULATION | Facility: CLINIC | Age: 65
End: 2025-08-04

## 2025-08-04 ENCOUNTER — LAB ENCOUNTER (OUTPATIENT)
Dept: LAB | Age: 65
End: 2025-08-04
Attending: FAMILY MEDICINE

## 2025-08-04 DIAGNOSIS — D57.20 SICKLE CELL DISEASE, TYPE SC, WITHOUT CRISIS (HCC): Primary | ICD-10-CM

## 2025-08-04 DIAGNOSIS — Z51.81 MONITORING FOR LONG-TERM ANTICOAGULANT USE: ICD-10-CM

## 2025-08-04 DIAGNOSIS — Z79.01 MONITORING FOR LONG-TERM ANTICOAGULANT USE: ICD-10-CM

## 2025-08-04 DIAGNOSIS — D57.00 HB-SS DISEASE WITH CRISIS (HCC): ICD-10-CM

## 2025-08-04 LAB
INR BLD: 3.31 (ref 0.8–1.2)
INR: 5 (ref 2.5–3.5)
PROTHROMBIN TIME: 35.1 SECONDS (ref 11.6–14.8)
TEST STRIP EXPIRATION DATE: ABNORMAL DATE

## 2025-08-04 PROCEDURE — 85610 PROTHROMBIN TIME: CPT | Performed by: FAMILY MEDICINE

## 2025-08-04 PROCEDURE — 36415 COLL VENOUS BLD VENIPUNCTURE: CPT | Performed by: FAMILY MEDICINE

## 2025-08-08 ENCOUNTER — HOSPITAL ENCOUNTER (OUTPATIENT)
Dept: MAMMOGRAPHY | Facility: HOSPITAL | Age: 65
Discharge: HOME | End: 2025-08-08
Attending: FAMILY MEDICINE

## 2025-08-08 ENCOUNTER — RESULTS FOLLOW-UP (OUTPATIENT)
Dept: FAMILY MEDICINE CLINIC | Facility: CLINIC | Age: 65
End: 2025-08-08

## 2025-08-08 DIAGNOSIS — Z79.01 MONITORING FOR LONG-TERM ANTICOAGULANT USE: Primary | ICD-10-CM

## 2025-08-08 DIAGNOSIS — Z51.81 MONITORING FOR LONG-TERM ANTICOAGULANT USE: Primary | ICD-10-CM

## 2025-08-08 DIAGNOSIS — R92.2 INCONCLUSIVE MAMMOGRAM: ICD-10-CM

## 2025-08-08 PROCEDURE — 77061 BREAST TOMOSYNTHESIS UNI: CPT | Performed by: FAMILY MEDICINE

## 2025-08-08 PROCEDURE — 76642 ULTRASOUND BREAST LIMITED: CPT | Performed by: FAMILY MEDICINE

## 2025-08-08 PROCEDURE — 77065 DX MAMMO INCL CAD UNI: CPT | Performed by: FAMILY MEDICINE

## 2025-08-18 ENCOUNTER — TELEPHONE (OUTPATIENT)
Dept: FAMILY MEDICINE CLINIC | Facility: CLINIC | Age: 65
End: 2025-08-18

## 2025-08-18 ENCOUNTER — HOSPITAL ENCOUNTER (OUTPATIENT)
Dept: MAMMOGRAPHY | Facility: HOSPITAL | Age: 65
Discharge: HOME | End: 2025-08-18
Attending: FAMILY MEDICINE

## 2025-08-18 ENCOUNTER — NURSE ONLY (OUTPATIENT)
Dept: LAB | Age: 65
End: 2025-08-18
Attending: RADIOLOGY

## 2025-08-18 DIAGNOSIS — R92.8 ABNORMAL MAMMOGRAM: ICD-10-CM

## 2025-08-18 DIAGNOSIS — R92.8 ABNORMAL MAMMOGRAM: Primary | ICD-10-CM

## 2025-08-18 DIAGNOSIS — Z51.81 MONITORING FOR LONG-TERM ANTICOAGULANT USE: ICD-10-CM

## 2025-08-18 DIAGNOSIS — Z79.01 MONITORING FOR LONG-TERM ANTICOAGULANT USE: ICD-10-CM

## 2025-08-18 LAB
INR BLD: 3.24 (ref 0.8–1.2)
PROTHROMBIN TIME: 33.3 SECONDS (ref 11.6–14.8)

## 2025-08-18 PROCEDURE — 36415 COLL VENOUS BLD VENIPUNCTURE: CPT

## 2025-08-18 PROCEDURE — 19083 BX BREAST 1ST LESION US IMAG: CPT | Performed by: FAMILY MEDICINE

## 2025-08-18 PROCEDURE — 88377 M/PHMTRC ALYS ISHQUANT/SEMIQ: CPT | Performed by: FAMILY MEDICINE

## 2025-08-18 PROCEDURE — 85610 PROTHROMBIN TIME: CPT

## 2025-08-18 PROCEDURE — 88305 TISSUE EXAM BY PATHOLOGIST: CPT | Performed by: FAMILY MEDICINE

## 2025-08-18 PROCEDURE — 38505 NEEDLE BIOPSY LYMPH NODES: CPT | Performed by: FAMILY MEDICINE

## 2025-08-18 PROCEDURE — 77065 DX MAMMO INCL CAD UNI: CPT | Performed by: FAMILY MEDICINE

## 2025-08-18 PROCEDURE — 19084 BX BREAST ADD LESION US IMAG: CPT | Performed by: FAMILY MEDICINE

## 2025-08-18 PROCEDURE — 88341 IMHCHEM/IMCYTCHM EA ADD ANTB: CPT | Performed by: FAMILY MEDICINE

## 2025-08-18 PROCEDURE — 76942 ECHO GUIDE FOR BIOPSY: CPT | Performed by: FAMILY MEDICINE

## 2025-08-18 PROCEDURE — 88344 IMHCHEM/IMCYTCHM EA MLT ANTB: CPT | Performed by: FAMILY MEDICINE

## 2025-08-18 PROCEDURE — 88360 TUMOR IMMUNOHISTOCHEM/MANUAL: CPT | Performed by: FAMILY MEDICINE

## 2025-08-18 PROCEDURE — 88342 IMHCHEM/IMCYTCHM 1ST ANTB: CPT | Performed by: FAMILY MEDICINE

## 2025-08-22 ENCOUNTER — TELEPHONE (OUTPATIENT)
Facility: LOCATION | Age: 65
End: 2025-08-22

## 2025-08-25 DIAGNOSIS — G89.29 OTHER CHRONIC PAIN: ICD-10-CM

## 2025-08-25 DIAGNOSIS — Z79.891 LONG TERM (CURRENT) USE OF OPIATE ANALGESIC: ICD-10-CM

## 2025-08-25 RX ORDER — HYDROCODONE BITARTRATE AND ACETAMINOPHEN 10; 325 MG/1; MG/1
1 TABLET ORAL 2 TIMES DAILY PRN
Qty: 60 TABLET | Refills: 0 | Status: SHIPPED | OUTPATIENT
Start: 2025-08-25

## (undated) DIAGNOSIS — D57.00 SICKLE CELL ANEMIA WITH CRISIS (HCC): ICD-10-CM

## (undated) DIAGNOSIS — G89.29 OTHER CHRONIC PAIN: ICD-10-CM

## (undated) DIAGNOSIS — Z79.891 LONG TERM (CURRENT) USE OF OPIATE ANALGESIC: ICD-10-CM

## (undated) DIAGNOSIS — K59.00 CONSTIPATION, UNSPECIFIED CONSTIPATION TYPE: ICD-10-CM

## (undated) DIAGNOSIS — Z12.11 COLON CANCER SCREENING: ICD-10-CM

## (undated) DIAGNOSIS — R10.9 ABDOMINAL PAIN, UNSPECIFIED ABDOMINAL LOCATION: Primary | ICD-10-CM

## (undated) DEVICE — KIT CLEAN ENDOKIT 1.1OZ GOWNX2

## (undated) DEVICE — 35 ML SYRINGE REGULAR TIP: Brand: MONOJECT

## (undated) DEVICE — MEDI-VAC NON-CONDUCTIVE SUCTION TUBING 6MM X 1.8M (6FT.) L: Brand: CARDINAL HEALTH

## (undated) DEVICE — SNARE OPTMZ PLPCTM TRP

## (undated) DEVICE — FORCEP RADIAL JAW 4

## (undated) DEVICE — CONMED SCOPE SAVER BITE BLOCK, 20X27 MM: Brand: SCOPE SAVER

## (undated) DEVICE — KIT ENDO ORCAPOD 160/180/190

## (undated) DEVICE — SNARE ENDOSCOPIC 10MM ROUND

## (undated) DEVICE — LINE MNTR ADLT SET O2 INTMD

## (undated) NOTE — LETTER
AUTHORIZATION FOR SURGICAL OPERATION OR OTHER PROCEDURE    1. I hereby authorize Dr. Michelle Parmar PA-C, and [facility] [facility] staff assigned to my case to perform the following operation and/or procedure at the [place] [facility]:    _______________________________________________________________________________________________    Cortisone Injection and Aspiration to Left knee  _______________________________________________________________________________________________    2. My physician has explained the nature and purpose of the operation or other procedure, possible alternative methods of treatment, the risks involved, and the possibility of complication to me. I acknowledge that no guarantee has been made as to the result that may be obtained. 3.  I recognize that, during the course of this operation, or other procedure, unforseen conditions may necessitate additional or different procedure than those listed above. I, therefore, further authorize and request that the above named physician, his/her physician assistants or designees perform such procedures as are, in his/her professional opinion, necessary and desirable. 4.  Any tissue or organs removed in the operation or other procedure may be disposed of by and at the discretion of the [facility], [facility] and [facility]. 5.  I understand that in the event of a medical emergency, I will be transported by local paramedics to [facility] or other hospital emergency department. 6.  I certify that I have read and fully understand the above consent to operation and/or other procedure. 7.  I acknowledge that my physician has explained sedation/analgesia administration to me including the risks and benefits. I consent to the administration of sedation/analgesia as may be necessary or desirable in the judgement of my physician.     Witness signature: ___________________________________________________ Date: ______/______/_____                    Time:  ________ A. M.  P.M. Patient Name:  ______________________________________________________  (please print)      Patient signature:  ___________________________________________________             Relationship to Patient:           []  Parent    Responsible person                          []  Spouse  In case of minor or                    [] Other  _____________   Incompetent name:  __________________________________________________                               (please print)      _____________      Responsible person  In case of minor or  Incompetent signature:  _______________________________________________    Statement of Physician  My signature below affirms that prior to the time of the procedure, I have explained to the patient and/or his/her guardian, the risks and benefits involved in the proposed treatment and any reasonable alternative to the proposed treatment. I have also explained the risks and benefits involved in the refusal of the proposed treatment and have answered the patient's questions.                         Date:  ______/______/_______  Provider                      Signature:  __________________________________________________________       Time:  ___________ A.M    P.M.

## (undated) NOTE — LETTER
AUTHORIZATION FOR SURGICAL OPERATION OR OTHER PROCEDURE    1.  I hereby authorize Dr. Aarti Stevens, and [facility] staff assigned to my case to perform the following operation and/or procedure at the [facility]:    ____Sherrye Time:  ________ A. M.  P.M.        Patient Name:  ______________________________________________________  (please print)      Patient signature:  ___________________________________________________             Relationship to Patient:

## (undated) NOTE — Clinical Note
Pt seen today for hydration. C/o persistent headaches over last month. Denies hx migraines. L sided pain at times drifts to R when walking. No weakness noted. BP good, checked cbc and retic.    [id number] APRN

## (undated) NOTE — LETTER
9/12/2022              Francisco Najera        [address]  [place]         Dear Casandra Perez,    Our records indicate that the tests ordered for you by Anil Reza. Sixto Adamson MD  have not been done. If you have, in fact, already completed the tests or you do not wish to have the tests done, please contact our office at [phone]. Otherwise, please proceed with the testing. Enclosed is a duplicate order for your convenience. Lab  Order:   PROTHROMBIN TIME (PT)  Please call Central scheduling at [phone] to schedule this test order      Sincerely,    Anil Reza.  Sixto Adamson MD  [address]  [address] [facility]  [address]  [place] [address] [phone]

## (undated) NOTE — LETTER
AUTHORIZATION FOR SURGICAL OPERATION OR OTHER PROCEDURE    1. I hereby authorize Dr. Perez/ Todd, Gonzalo SAWYER, and [facility] staff assigned to my case to perform the following operation and/or procedure at the [facility]:    _______________________________________________________________________________________________    Cortisone Injection and Aspiration to Left Knee  _______________________________________________________________________________________________    2.  My physician has explained the nature and purpose of the operation or other procedure, possible alternative methods of treatment, the risks involved, and the possibility of complication to me.  I acknowledge that no guarantee has been made as to the result that may be obtained.  3.  I recognize that, during the course of this operation, or other procedure, unforseen conditions may necessitate additional or different procedure than those listed above.  I, therefore, further authorize and request that the above named physician, his/her physician assistants or designees perform such procedures as are, in his/her professional opinion, necessary and desirable.  4.  Any tissue or organs removed in the operation or other procedure may be disposed of by and at the discretion of the [facility] and [facility].  5.  I understand that in the event of a medical emergency, I will be transported by local paramedics to [facility] or other hospital emergency department.  6.  I certify that I have read and fully understand the above consent to operation and/or other procedure.    7.  I acknowledge that my physician has explained sedation/analgesia administration to me including the risks and benefits.  I consent to the administration of sedation/analgesia as may be necessary or desirable in the judgement of my physician.    Witness signature: ___________________________________________________ Date:   ______/______/_____                    Time:  ________ A.M.  P.M.       Patient Name:  ______________________________________________________  (please print)      Patient signature:  ___________________________________________________             Relationship to Patient:           []  Parent    Responsible person                          []  Spouse  In case of minor or                    [] Other  _____________   Incompetent name:  __________________________________________________                               (please print)      _____________      Responsible person  In case of minor or  Incompetent signature:  _______________________________________________    Statement of Physician  My signature below affirms that prior to the time of the procedure, I have explained to the patient and/or his/her guardian, the risks and benefits involved in the proposed treatment and any reasonable alternative to the proposed treatment.  I have also explained the risks and benefits involved in the refusal of the proposed treatment and have answered the patient's questions.                        Date:  ______/______/_______  Provider                      Signature:  __________________________________________________________       Time:  ___________ A.M    P.M.

## (undated) NOTE — LETTER
7/28/2020              Juan Alejandro        [address]  [place]          Dear Buck Puckett,      It was a pleasure to see you at our [place], [place] office.   Your lab tests were normal.  There is no

## (undated) NOTE — LETTER
12/27/2023              Eulogio Dakins        [address]        [place] [phone]         To whom it may concern,    Eulogio Dakins is currently a patient under my medical care. The patient's medical condition - sickle cell anemia makes serving on jury duty inadvisable at this time. Please excuse them from serving. If you require additional information please do not hesitate to contact my office.         Sincerely,    Chato Hodgson MD  [facility] [address] [address] [phone]        Document electronically generated by:  Anjel Youngblood MD

## (undated) NOTE — LETTER
Doris Phillips, [address]  [employer],  [place]       09/18/22        Patient: Tena Carey   YOB: 1960   Date of Visit: 8/4/2022       Dear  Dr. Franci Shelley MD,      Thank you for referring Tena Carey to my practice. Please find my assessment and plan below. Viviana Garcia is a 65year old  RH female w/ a pmhx sig for HTN and obesity, asthma, LUIS ARMANDO but not using CPAP, sickle cell disease on warfarin, and strong maternal family history of demenita who presents for short term memory loss for at least  6 mo and getting lost while driving in familiar areas with  concern for dementia. She endorses deficits in her short-term memory. She has not been wearing her CPAP from some time. She also has a significant maternal family history of dementia. She scored a 20 out of 30 on her [place] cognitive assessment. Her biggest area of deficit was in delayed recall. Etiology of her symptoms is multifactorial.  May be a hereditary component. The patient did have cognitive decline due to a neurodegenerative disease she would have early onset dementia. There may also be a component of cognitive impairment due to obstructive sleep apnea. Given that she reports that her symptoms began suddenly her MRI will be useful to help exclude a subacute infarct. Also order CTA of the head and neck to evaluate the vasculature for a source for any potential infarct. 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 Patterns seen on cognitive testing/cognitive profile: Amnestic. Associated conditions include MCI and Alzheimer's disease    1. Becoming lost while driving in familiar areas, short-term memory deficits  Differential Diagnosis:  1.  Multifactorial including nonadherence to CPAP, possibly cerebrovascular component, and ?hereditary neurodegenerative disease  2.                  Sincerely,   Brittney Conteh DO   [address] [place] [address] [address] [id number]    Document electronically generated by:  Brittney Conteh DO

## (undated) NOTE — MR AVS SNAPSHOT
After Visit Summary   3/6/2024    Daphne Reynoso   MRN: [id number]           Visit Information     Date & Time  3/6/2024  3:30 PM Provider  [facility] Phone  [phone]      Your Vitals Were  Most recent update: 3/6/2024  4:56 PM    BP   121/72    Pulse   76            Allergies as of 3/6/2024  Review status set to Review Complete on 3/6/2024       Noted Reaction Type Reactions    Codeine 07/09/2019    PALPITATIONS    Morphine 07/09/2019    PALPITATIONS    Opioid Analgesics 01/28/2005    SHORTNESS OF BREATH    heart races with the use of codeine  Itching and tachycardia    Sulfa Antibiotics 08/23/2007    SHORTNESS OF BREATH    Rash and tongue swells, itching  Other reaction(s): Swelling – oral/pharyngeal    Dust Mite Extract 04/27/2016    OTHER (SEE COMMENTS)    Stuffy nose, HA      Your Current Medications        Dosage    HYDROcodone-acetaminophen  MG Oral Tab Half to one tablet every four hours as needed for pain    amLODIPine Besy-Benazepril HCl 10-20 MG Oral Cap Take 1 capsule by mouth daily.    warfarin 5 MG Oral Tab Take as directed by the INR clinic or take 1 tablet Monday Wednesday Friday, take 1 & 1/2 tablets all other days.    lidocaine 5 % External Patch PLACE 2 PATCHES ONTO THE SKIN AT NIGHT PLACE FOR 12 HOURS ON AND 12 HOURS OFF    folic acid 1 MG Oral Tab Take 1 tablet (1 mg total) by mouth daily.    Oxybutynin Chloride ER 15 MG Oral Tablet 24 Hr Take 1 tablet (15 mg total) by mouth daily. Take with 1 tablet (5mg )daily for total of 20 mg daily.    fluticasone propionate 50 MCG/ACT Nasal Suspension 2 sprays by Nasal route daily.    albuterol 108 (90 Base) MCG/ACT Inhalation Aero Soln Inhale 2 puffs into the lungs every 4 (four) hours as needed.    Calcium Citrate-Vitamin D 315-250 MG-UNIT Oral Tab Take 1 tablet by mouth daily.    Vitamin D3, Cholecalciferol, 10 MCG (400 UNIT) Oral Tab Take 2.5  tablets (1,000 Units total) by mouth daily.      Diagnoses for This Visit    Chronic pain of left knee   [926250]  -  Primary  Primary osteoarthritis of left knee   [979705]             We Ordered the Following     Normal Orders This Visit    arthrocentesis major joint [20610 CPT(R)]     Cell Count and Diff, Synovial [RXU1756 CUSTOM]     Cell Count/Diff & Crystals, Synovial [NSQ2050 CUSTOM]     CELL CT/DIF SYNOVIAL [HRE8909 CUSTOM]     CRYSTAL EXAM,MISCELLANEOUS FL [0404182 CPT(R)]     Future Labs/Procedures Expected by Expires    Cell Count/Diff & Crystals, Synovial [[id number] CUSTOM]  3/6/2024 3/6/2025    MRI KNEE, LEFT (OQY=27980) [31372 CPT(R)]  3/6/2024 (Approximate) 3/6/2025      Future Appointments        Provider Department    3/12/2024 10:45 AM Kim Alejandre [facility]    4/9/2024 9:50 AM Radha Ramos [facility]      Imaging Scheduling Instructions     Around March 6, 2024   Imaging:   MRI KNEE, LEFT (XAL=25510)    Instructions: Your order will generate a \"Scheduling Ticket\" that will be available in [employer] to schedule on your own at a time most convenient to you. To ensure you receive your test in a timely manner, STAT orders will not generate a ticket and must be scheduled by calling the Central Scheduling department.    Your physician has ordered a radiology test that may require authorization from your insurance company.  Your physician or the clinic staff will work with your insurance company to obtain this authorization for your ordered radiology test.    If you do not have a Connectip[employer] Account, or if you prefer to speak with someone to schedule your appointment, please call [facility] Central Scheduling at [phone].                    Did you know that [facility] primary care physicians now offer Video Visits through [employer] for adult patients for a variety of conditions such as allergies, back pain and  cold symptoms? Skip the drive and waiting room and online chat with a doctor face-to-face using your web-cam enabled computer or mobile device wherever you are. Video Visits cost $50 and can be paid hassle-free using a credit, debit, or health savings card.  Not active on [employer]? Ask us how to get signed up today!          If you receive a survey from [facility], please take a few minutes to complete it and provide feedback. We strive to deliver the best patient experience and are looking for ways to make improvements. Your feedback will help us do so. For more information on [facility], please visit [email or web address]           No text in SmartText           No text in SmartText

## (undated) NOTE — LETTER
AUTHORIZATION FOR SURGICAL OPERATION OR OTHER PROCEDURE    1. I hereby authorize Dr. Perez/ SILVERIO Brooks , and [facility] staff assigned to my case to perform the following operation and/or procedure at the [facility] site:    Aspiration and cortisone injection in Left knee  _______________________________________________________________________________________________      _______________________________________________________________________________________________    2.  My physician has explained the nature and purpose of the operation or other procedure, possible alternative methods of treatment, the risks involved, and the possibility of complication to me.  I acknowledge that no guarantee has been made as to the result that may be obtained.  3.  I recognize that, during the course of this operation, or other procedure, unforseen conditions may necessitate additional or different procedure than those listed above.  I, therefore, further authorize and request that the above named physician, his/her physician assistants or designees perform such procedures as are, in his/her professional opinion, necessary and desirable.  4.  Any tissue or organs removed in the operation or other procedure may be disposed of by and at the discretion of the [facility] and [facility].  5.  I understand that in the event of a medical emergency, I will be transported by local paramedics to [facility] or other hospital emergency department.  6.  I certify that I have read and fully understand the above consent to operation and/or other procedure.    7.  I acknowledge that my physician has explained sedation/analgesia administration to me including the risks and benefits.  I consent to the administration of sedation/analgesia as may be necessary or desirable in the judgement of my physician.    Witness signature:  ___________________________________________________ Date:  ______/______/_____                    Time:  ________ A.M.  P.M.       Patient Name:  ______________________________________________________  (please print)      Patient signature:  ___________________________________________________             Relationship to Patient:           []  Parent    Responsible person                          []  Spouse  In case of minor or                    [] Other  _____________   Incompetent name:  __________________________________________________                               (please print)      _____________      Responsible person  In case of minor or  Incompetent signature:  _______________________________________________    Statement of Physician  My signature below affirms that prior to the time of the procedure, I have explained to the patient and/or his/her guardian, the risks and benefits involved in the proposed treatment and any reasonable alternative to the proposed treatment.  I have also explained the risks and benefits involved in the refusal of the proposed treatment and have answered the patient's questions.                        Date:  ______/______/_______  Provider                      Signature:  __________________________________________________________       Time:  ___________ BAO NELSON

## (undated) NOTE — LETTER
[address]  Authorization for Invasive Procedure                                                                                           1. I hereby authorize Tahmina Bear MD, my physician and his/her assistants (if applicable), which may include medical students, residents, and/or fellows, to perform the following surgical operation/ procedure and administer such anesthesia as may be determined necessary by my physician: Operation/Procedure name (s) COLONOSCOPY-SCREENING   ESOPHAGOGASTRODUODENOSCOPY (EGD) on Daphne Reynoso   2. I recognize that during the surgical operation/procedure, unforeseen conditions may necessitate additional or different procedures than those listed above. I, therefore, further authorize and request that the above-named surgeon, assistants, or designees perform such procedures as are, in their judgment, necessary and desirable. 3.   My surgeon/physician has discussed prior to my surgery the potential benefits, risks and side effects of this procedure; the likelihood of achieving goals; and potential problems that might occur during recuperation. They also discussed reasonable alternatives to the procedure, including risks, benefits, and side effects related to the alternatives and risks related to not receiving this procedure. I have had all my questions answered and I acknowledge that no guarantee has been made as to the result that may be obtained. 4.   Should the need arise during my operation/procedure, which includes change of level of care prior to discharge, I also consent to the administration of blood and/or blood products. Further, I understand that despite careful testing and screening of blood or blood products by collecting agencies, I may still be subject to ill effects as a result of receiving a blood transfusion and/or blood products.   The following are some, but not all, of the potential risks that can occur: fever and allergic reactions, hemolytic reactions, transmission of diseases such as Hepatitis, AIDS and Cytomegalovirus (CMV) and fluid overload. In the event that I wish to have an autologous transfusion of my own blood, or a directed donor transfusion, I will discuss this with my physician. Check only if Refusing Blood or Blood Products  I understand refusal of blood or blood products as deemed necessary by my physician may have serious consequences to my condition to include possible death. I hereby assume responsibility for my refusal and release the hospital, its personnel, and my physicians from any responsibility for the consequences of my refusal.           ____ Refuse      5. I authorize the use of any specimen, organs, tissues, body parts or foreign objects that may be removed from my body during the operation/procedure for diagnosis, research or teaching purposes and their subsequent disposal by hospital authorities. I also authorize the release of specimen test results and/or written reports to my treating physician on the hospital medical staff or other referring or consulting physicians involved in my care, at the discretion of the Pathologist or my treating physician. 6.   I consent to the photographing or videotaping of the operations or procedures to be performed, including appropriate portions of my body for medical, scientific, or educational purposes, provided my identity is not revealed by the pictures or by descriptive texts accompanying them. If the procedure has been photographed/videotaped, the surgeon will obtain the original picture, image, videotape or CD. The hospital will not be responsible for storage, release or maintenance of the picture, image, tape or CD.    7.   I consent to the presence of a  or observers in the operating room as deemed necessary by my physician or their designees.     8.   I recognize that in the event my procedure results in extended X-Ray/fluoroscopy time, I may develop a skin reaction. 9. If I have a Do Not Attempt Resuscitation (DNAR) order in place, that status will be suspended while in the operating room, procedural suite, and during the recovery period unless otherwise explicitly stated by me (or a person authorized to consent on my behalf). The surgeon or my attending physician will determine when the applicable recovery period ends for purposes of reinstating the DNAR order. 10. Patients having a sterilization procedure: I understand that if the procedure is successful the results will be permanent and it will therefore be impossible for me to inseminate, conceive, or bear children. I also understand that the procedure is intended to result in sterility, although the result has not been guaranteed. 11. I acknowledge that my physician has explained sedation/analgesia administration to me including the risk and benefits I consent to the administration of sedation/analgesia as may be necessary or desirable in the judgment of my physician. I CERTIFY THAT I HAVE READ AND FULLY UNDERSTAND THE ABOVE CONSENT TO OPERATION and/or OTHER PROCEDURE.     _________________________________________ _________________________________     ___________________________________  Signature of Patient     Signature of Responsible Person                   Printed Name of Responsible Person                              _________________________________________ ______________________________        ___________________________________  Signature of Witness         Date  Time         Relationship to Patient    STATEMENT OF PHYSICIAN My signature below affirms that prior to the time of the procedure; I have explained to the patient and/or his/her legal representative, the risks and benefits involved in the proposed treatment and any reasonable alternative to the proposed treatment.  I have also explained the risks and benefits involved in refusal of the proposed treatment and alternatives to the proposed treatment and have answered the patient's questions.  If I have a significant financial interest in a co-management agreement or a significant financial interest in any product or implant, or other significant relationship used in this procedure/surgery, I have disclosed this and had a discussion with my patient.     _______________________________________________________________ _____________________________  [place] of Physician)                                                                                         (Date)                                   (Time)  Patient Name: Ned Chu    : 1960   Printed: 10/15/2022      Medical Record #: [id number]                                              Page 1 of 1

## (undated) NOTE — LETTER
12/27/2023      To Whom It May Concern:    Eulogio Dakins is currently under my medical care and due to her sickle cell disease she can not attend [employer]. If you require additional information please contact our office.       Sincerely,    Chato Hodgson MD  [address]  [address]   [phone]      Document generated by:  Jareth Mendez RN